# Patient Record
Sex: MALE | Race: WHITE | NOT HISPANIC OR LATINO | Employment: FULL TIME | URBAN - METROPOLITAN AREA
[De-identification: names, ages, dates, MRNs, and addresses within clinical notes are randomized per-mention and may not be internally consistent; named-entity substitution may affect disease eponyms.]

---

## 2017-01-03 ENCOUNTER — TELEPHONE (OUTPATIENT)
Dept: CARDIOLOGY | Facility: MEDICAL CENTER | Age: 67
End: 2017-01-03

## 2017-01-03 NOTE — TELEPHONE ENCOUNTER
"----- Message from Natalia Merritt L.P.N. sent at 1/3/2017  9:20 AM PST -----  Regarding: FW: Pt passed the FAA physical  Contact: [phone]  Contacted patient and will be more prepared next year if \"flash images\" are requested.    ----- Message -----     From: Antonette Rosas     Sent: 1/3/2017   9:06 AM       To: Natalia Merritt L.P.N.  Subject: Pt passed the FAA physical                       RO/natalia Hernandez calling with feedback on his FAA physical feedback.  Pt wants to let you know he passed and would like to thank you for all your help & to wish you a very Happy New Year too.  Please call  when you have a moment today.      "

## 2017-03-08 ENCOUNTER — TELEPHONE (OUTPATIENT)
Dept: CARDIOLOGY | Facility: MEDICAL CENTER | Age: 67
End: 2017-03-08

## 2017-03-08 NOTE — TELEPHONE ENCOUNTER
"----- Message from Antonette Rosas sent at 3/8/2017  9:14 AM PST -----  Regarding: stress test needed by 4/1 for FAA  Contact: [phone]  Candida Hernandez received his FAA \"pass\", but needs stress test done by 4/1 as he is being monitored closely.  Please call to discuss, [phone]  "

## 2017-03-09 NOTE — TELEPHONE ENCOUNTER
"Spoke with patient who reports he has upcoming requirements for his FAA physical which requires he get a \"stress test\" prior to April 1. 2017.  He is to call the FAA and clarify what type of stress test they are now requiring.  Once he has determined that he will call back to get it scheduled.  "

## 2017-03-10 ENCOUNTER — TELEPHONE (OUTPATIENT)
Dept: CARDIOLOGY | Facility: MEDICAL CENTER | Age: 67
End: 2017-03-10

## 2017-03-10 DIAGNOSIS — E78.5 DYSLIPIDEMIA: ICD-10-CM

## 2017-03-10 DIAGNOSIS — I10 ESSENTIAL HYPERTENSION: ICD-10-CM

## 2017-03-10 DIAGNOSIS — Z95.1 S/P CABG X 2: ICD-10-CM

## 2017-03-10 DIAGNOSIS — Z79.899 HIGH RISK MEDICATION USE: ICD-10-CM

## 2017-03-10 DIAGNOSIS — I25.10 MULTIPLE VESSEL CORONARY ARTERY DISEASE: ICD-10-CM

## 2017-03-10 NOTE — TELEPHONE ENCOUNTER
S/w pt in regards to FAA requirements. Pt states he needs an exercises stress test, a cardiology evaluation, and labs done before 4/1/17. Orders placed for labs and Stress test. Labs slips faxed to pt to [phone] per request. Message sent to scheduling pool for pt to get set up with an earlier appointment and Stress test. Pt will call back if not contacted by scheduling.

## 2017-03-13 NOTE — TELEPHONE ENCOUNTER
Called pt back to discuss future appointments. Pt assisted with getting scheduled appointments, labs slips re-faxed per pt request.

## 2017-03-14 ENCOUNTER — NON-PROVIDER VISIT (OUTPATIENT)
Dept: CARDIOLOGY | Facility: MEDICAL CENTER | Age: 67
End: 2017-03-14
Payer: MEDICARE

## 2017-03-14 VITALS
HEIGHT: 68 IN | SYSTOLIC BLOOD PRESSURE: 120 MMHG | DIASTOLIC BLOOD PRESSURE: 64 MMHG | BODY MASS INDEX: 27.58 KG/M2 | WEIGHT: 182 LBS | OXYGEN SATURATION: 95 % | HEART RATE: 81 BPM

## 2017-03-14 DIAGNOSIS — Z95.1 S/P CABG X 2: ICD-10-CM

## 2017-03-14 DIAGNOSIS — I10 ESSENTIAL HYPERTENSION: ICD-10-CM

## 2017-03-14 DIAGNOSIS — I25.10 MULTIPLE VESSEL CORONARY ARTERY DISEASE: ICD-10-CM

## 2017-03-14 LAB — TREADMILL STRESS: NORMAL

## 2017-03-14 PROCEDURE — 93015 CV STRESS TEST SUPVJ I&R: CPT | Performed by: INTERNAL MEDICINE

## 2017-03-17 ENCOUNTER — OFFICE VISIT (OUTPATIENT)
Dept: CARDIOLOGY | Facility: MEDICAL CENTER | Age: 67
End: 2017-03-17
Payer: MEDICARE

## 2017-03-17 VITALS
SYSTOLIC BLOOD PRESSURE: 124 MMHG | DIASTOLIC BLOOD PRESSURE: 78 MMHG | HEART RATE: 68 BPM | BODY MASS INDEX: 28.34 KG/M2 | OXYGEN SATURATION: 98 % | HEIGHT: 68 IN | WEIGHT: 187 LBS

## 2017-03-17 DIAGNOSIS — I10 ESSENTIAL HYPERTENSION: ICD-10-CM

## 2017-03-17 LAB
ALBUMIN SERPL-MCNC: 4.4 G/DL (ref 3.6–4.8)
ALBUMIN/GLOB SERPL: 1.6 {RATIO} (ref 1.2–2.2)
ALP SERPL-CCNC: 43 IU/L (ref 39–117)
ALT SERPL-CCNC: 39 IU/L (ref 0–44)
AST SERPL-CCNC: 29 IU/L (ref 0–40)
BILIRUB SERPL-MCNC: 0.4 MG/DL (ref 0–1.2)
BUN SERPL-MCNC: 20 MG/DL (ref 8–27)
BUN/CREAT SERPL: 16 (ref 10–22)
CALCIUM SERPL-MCNC: 10.2 MG/DL (ref 8.6–10.2)
CHLORIDE SERPL-SCNC: 107 MMOL/L (ref 96–106)
CHOLEST SERPL-MCNC: 133 MG/DL (ref 100–199)
CO2 SERPL-SCNC: 21 MMOL/L (ref 18–29)
COMMENT 011824: ABNORMAL
CREAT SERPL-MCNC: 1.29 MG/DL (ref 0.76–1.27)
GLOBULIN SER CALC-MCNC: 2.7 G/DL (ref 1.5–4.5)
GLUCOSE SERPL-MCNC: 132 MG/DL (ref 65–99)
HDLC SERPL-MCNC: 32 MG/DL
LDLC SERPL CALC-MCNC: 60 MG/DL (ref 0–99)
POTASSIUM SERPL-SCNC: 5.1 MMOL/L (ref 3.5–5.2)
PROT SERPL-MCNC: 7.1 G/DL (ref 6–8.5)
SODIUM SERPL-SCNC: 145 MMOL/L (ref 134–144)
TRIGL SERPL-MCNC: 204 MG/DL (ref 0–149)
VLDLC SERPL CALC-MCNC: 41 MG/DL (ref 5–40)

## 2017-03-17 PROCEDURE — 4040F PNEUMOC VAC/ADMIN/RCVD: CPT | Mod: 8P | Performed by: INTERNAL MEDICINE

## 2017-03-17 PROCEDURE — 3017F COLORECTAL CA SCREEN DOC REV: CPT | Mod: 8P | Performed by: INTERNAL MEDICINE

## 2017-03-17 PROCEDURE — G8598 ASA/ANTIPLAT THER USED: HCPCS | Performed by: INTERNAL MEDICINE

## 2017-03-17 PROCEDURE — 1101F PT FALLS ASSESS-DOCD LE1/YR: CPT | Mod: 8P | Performed by: INTERNAL MEDICINE

## 2017-03-17 PROCEDURE — 99214 OFFICE O/P EST MOD 30 MIN: CPT | Performed by: INTERNAL MEDICINE

## 2017-03-17 PROCEDURE — G8484 FLU IMMUNIZE NO ADMIN: HCPCS | Performed by: INTERNAL MEDICINE

## 2017-03-17 PROCEDURE — 1036F TOBACCO NON-USER: CPT | Performed by: INTERNAL MEDICINE

## 2017-03-17 PROCEDURE — G8432 DEP SCR NOT DOC, RNG: HCPCS | Performed by: INTERNAL MEDICINE

## 2017-03-17 PROCEDURE — G8420 CALC BMI NORM PARAMETERS: HCPCS | Performed by: INTERNAL MEDICINE

## 2017-03-17 ASSESSMENT — ENCOUNTER SYMPTOMS
DIZZINESS: 0
NEUROLOGICAL NEGATIVE: 1
PALPITATIONS: 0
CARDIOVASCULAR NEGATIVE: 1
ORTHOPNEA: 0
LOSS OF CONSCIOUSNESS: 0
MUSCULOSKELETAL NEGATIVE: 1
EYES NEGATIVE: 1
PND: 0
GASTROINTESTINAL NEGATIVE: 1
SHORTNESS OF BREATH: 0
CLAUDICATION: 0
PSYCHIATRIC NEGATIVE: 1

## 2017-03-17 NOTE — MR AVS SNAPSHOT
"Pradip Baez   3/17/2017 7:40 AM   Office Visit   MRN: [id number]    Department:  [facility]   Dept Phone:  [phone]    Description:  Male : 1950   Provider:  Luda Yanez M.D.           Reason for Visit     Follow-Up           Allergies as of 3/17/2017     No Known Allergies      You were diagnosed with     Essential hypertension   [[id number]]         Vital Signs     Blood Pressure Pulse Height Weight Body Mass Index Oxygen Saturation    124/78 mmHg 68 1.727 m (5' 7.99\") 84.823 kg (187 lb) 28.44 kg/m2 98%    Smoking Status                   Never Smoker            Basic Information     Date Of Birth Sex Race Ethnicity Preferred Language    1950 Male White Non- English      Problem List              ICD-10-CM Priority Class Noted - Resolved    DM (diabetes mellitus) (CMS-HCC) E11.9 High  2012 - Present    Dyslipidemia E78.5   2012 - Present    Multiple vessel coronary artery disease I25.10   2012 - Present    Hypercholesterolemia with hypertriglyceridemia E78.2   2014 - Present    HTN (hypertension) I10   10/7/2015 - Present    Normocytic anemia D64.9   10/7/2015 - Present    S/P CABG x 2 Z95.1   10/23/2015 - Present      Health Maintenance        Date Due Completion Dates    DIABETES MONOFILAMENT / LE EXAM 1949 ---    RETINAL SCREENING 1966 ---    FASTING LIPID PROFILE 1966 ---    URINE ACR / MICROALBUMIN 1966 ---    IMM DTaP/Tdap/Td Vaccine (1 - Tdap) 1967 ---    COLONOSCOPY 1998 ---    IMM ZOSTER VACCINE 2008 ---    IMM PNEUMOCOCCAL 65+ (ADULT) LOW/MEDIUM RISK SERIES (1 of 2 - PCV13) 2013 ---    A1C SCREENING 2016 10/5/2015    IMM INFLUENZA (1) 2016 ---    SERUM CREATININE 10/11/2016 10/11/2015, 10/10/2015, 10/9/2015, 10/8/2015, 10/7/2015, 10/5/2015, 2015            Current Immunizations     No immunizations on file.      Below and/or attached are the medications your provider expects you to take. Review " all of your home medications and newly ordered medications with your provider and/or pharmacist. Follow medication instructions as directed by your provider and/or pharmacist. Please keep your medication list with you and share with your provider. Update the information when medications are discontinued, doses are changed, or new medications (including over-the-counter products) are added; and carry medication information at all times in the event of emergency situations     Allergies:  No Known Allergies          Medications  Valid as of: March 17, 2017 -  8:26 AM    Generic Name Brand Name Tablet Size Instructions for use    Aspirin (Tablet Delayed Response) aspirin 81 MG Take 1 Tab by mouth every day.        Atorvastatin Calcium (Tab) LIPITOR 40 MG Take 1 Tab by mouth every evening.        Clopidogrel Bisulfate (Tab) PLAVIX 75 MG TAKE 1 TABLET BY MOUTH EVERY DAY        Cyanocobalamin   Take 1 Tab by mouth every day. 3000 IU SL        Empagliflozin (Tab) Empagliflozin 10 MG Take 1 Tab by mouth every day. NOT TAKING        Folic Acid (Tab) FOLVITE 1 MG Take 1 mg by mouth every day.        Glimepiride (Tab) AMARYL 4 MG Take 4 mg by mouth 2 times a day.        Lisinopril (Tab) PRINIVIL 5 MG Take 1 Tab by mouth every day.        MetFORMIN HCl (TABLET SR 24 HR) GLUCOPHAGE  MG Take 500 mg by mouth 2 times a day.        MetFORMIN HCl   Take 500 mg by mouth 2 Times a Day.        Metoprolol Tartrate (Tab) LOPRESSOR 25 MG Take 1 Tab by mouth 2 times a day.        Misc Natural Products (Cap) TRIPLE FLEX  Take 2 Caps by mouth every day.        SITagliptin Phosphate (Tab) JANUVIA 100 MG Take 100 mg by mouth every day.        Tamsulosin HCl (Cap) FLOMAX 0.4 MG Take 0.4 mg by mouth ONE-HALF HOUR AFTER BREAKFAST.        .                 Medicines prescribed today were sent to:     [id number] - [place] - [address]    [address] [place] [address]    Phone: [phone] Fax: [phone]    Open 24  Hours?: No      Medication refill instructions:       If your prescription bottle indicates you have medication refills left, it is not necessary to call your provider’s office. Please contact your pharmacy and they will refill your medication.    If your prescription bottle indicates you do not have any refills left, you may request refills at any time through one of the following ways: The online [employer] system (except Urgent Care), by calling your provider’s office, or by asking your pharmacy to contact your provider’s office with a refill request. Medication refills are processed only during regular business hours and may not be available until the next business day. Your provider may request additional information or to have a follow-up visit with you prior to refilling your medication.   *Please Note: Medication refills are assigned a new Rx number when refilled electronically. Your pharmacy may indicate that no refills were authorized even though a new prescription for the same medication is available at the pharmacy. Please request the medicine by name with the pharmacy before contacting your provider for a refill.        Instructions    Please make sure to take metoprolol 25mg twice daily. It is not a once daily medicine.           [employer] Access Code: Activation code not generated  Current [employer] Status: Active

## 2017-03-17 NOTE — PROGRESS NOTES
Subjective:   Pradip Baez is a 66 y.o. 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 He has been doing well since his last appointment with Dr. Escoto. Exercises on a treadmill at 3-4 mph for 45 minutes at grade 6 without any symptoms. He denies any chest pain chest pressure, shortness of breath, palpitations or dizziness. No history of syncope. No heart failure symptoms.      Past Medical History   Diagnosis Date   • Hyperlipidemia    • Hypertension    • Myocardial infarct (CMS-HCC) 1/19/2012   • Diabetes      oral meds, Dr Cornelius   • Diabetes (CMS-HCC)    • F/u of acute inferolateral myocardial infarction (CMS-HCC) 2/7/2012     Past Surgical History   Procedure Laterality Date   • Stent placement     • Other cardiac surgery  1/19/2012     stent   • Recovery  10/2/2015     Procedure: CATH LAB [facility] WITH POSSIBLE WIEDENBECK;  Surgeon: [facility];  [place] [facility] [place]  Service:    • Multiple coronary artery bypass endo vein harvest  10/6/2015     Procedure: MULTIPLE CORONARY ARTERY BYPASS ENDO VEIN HARVEST X2;  Surgeon: Malcolm Lynn M.D.;  Location: SURGERY [facility];  Service:      Family History   Problem Relation Age of Onset   • Diabetes Mother    • Cancer Mother    • Heart Disease Father    • Diabetes Father      History   Smoking status   • Never Smoker    Smokeless tobacco   • Never Used     No Known Allergies  Outpatient Encounter Prescriptions as of 3/17/2017   Medication Sig Dispense Refill   • METFORMIN HCL PO Take 500 mg by mouth 2 Times a Day.  11   • clopidogrel (PLAVIX) 75 MG Tab TAKE 1 TABLET BY MOUTH EVERY DAY 30 Tab 10   • lisinopril (PRINIVIL) 5 MG Tab Take 1 Tab by mouth every day. 90 Tab 3   • atorvastatin (LIPITOR) 40 MG Tab Take 1 Tab by mouth every evening. 90 Tab 3   • tamsulosin  "(FLOMAX) 0.4 MG capsule Take 0.4 mg by mouth ONE-HALF HOUR AFTER BREAKFAST.     • metoprolol (LOPRESSOR) 25 MG Tab Take 1 Tab by mouth 2 times a day. (Patient taking differently: Take 25 mg by mouth every day.) 60 Tab 11   • aspirin EC 81 MG EC tablet Take 1 Tab by mouth every day. 30 Tab    • glimepiride (AMARYL) 4 MG Tab Take 4 mg by mouth 2 times a day.     • metformin ER (GLUCOPHAGE XR) 500 MG TABLET SR 24 HR Take 500 mg by mouth 2 times a day.     • sitagliptin (JANUVIA) 100 MG Tab Take 100 mg by mouth every day.     • [DISCONTINUED] carvedilol (COREG) 6.25 MG Tab TAKE 1 TAB BY MOUTH 2 TIMES A DAY, WITH MEALS.  3   • Cyanocobalamin (VITAMIN B-12 PO) Take 1 Tab by mouth every day. 3000 IU SL     • Misc Natural Products (TRIPLE FLEX) Cap Take 2 Caps by mouth every day.     • Empagliflozin (JARDIANCE) 10 MG Tab Take 1 Tab by mouth every day. NOT TAKING     • folic acid (FOLVITE) 1 MG Tab Take 1 mg by mouth every day.       No facility-administered encounter medications on file as of 3/17/2017.     Review of Systems   Constitutional: Negative for malaise/fatigue.   HENT: Negative.    Eyes: Negative.    Respiratory: Negative for shortness of breath.    Cardiovascular: Negative.  Negative for chest pain, palpitations, orthopnea, claudication, leg swelling and PND.   Gastrointestinal: Negative.    Musculoskeletal: Negative.    Skin: Negative.    Neurological: Negative.  Negative for dizziness and loss of consciousness.   Endo/Heme/Allergies: Negative.    Psychiatric/Behavioral: Negative.    All other systems reviewed and are negative.       Objective:   /78 mmHg  Pulse 68  Ht 1.727 m (5' 7.99\")  Wt 84.823 kg (187 lb)  BMI 28.44 kg/m2  SpO2 98%    Physical Exam   Constitutional: He is oriented to person, place, and time. He appears well-developed and well-nourished. No distress.   HENT:   Head: Normocephalic and atraumatic.   Eyes: Conjunctivae are normal. Right eye exhibits no discharge. Left eye exhibits " no discharge. No scleral icterus.   Neck: Normal range of motion. Neck supple. No JVD present.   Cardiovascular: Normal rate, regular rhythm, S1 normal, S2 normal, normal heart sounds and normal pulses.  Exam reveals no gallop, no S3, no S4 and no friction rub.    No murmur heard.   No systolic murmur is present    No diastolic murmur is present   Pulses:       Carotid pulses are 2+ on the right side, and 2+ on the left side.       Radial pulses are 2+ on the right side, and 2+ on the left side.        Dorsalis pedis pulses are 2+ on the right side, and 2+ on the left side.        Posterior tibial pulses are 2+ on the right side, and 2+ on the left side.   Pulmonary/Chest: Effort normal and breath sounds normal. No respiratory distress. He has no wheezes. He has no rales.   Abdominal: Soft. There is no tenderness.   Musculoskeletal: He exhibits no edema.   Neurological: He is alert and oriented to person, place, and time.   Skin: Skin is warm and dry. He is not diaphoretic.   Psychiatric: He has a normal mood and affect.   Nursing note and vitals reviewed.      Exercise treadmill test performed earlier this month. The tracings and report were reviewed. Patient exercised on the Nino protocol for 9 minutes and 31 seconds achieving a peak heart rate of 156 bpm which was over 100% of MPHR. Test stopped due to fatigue, target heart rate achieved. No ST-T wave changes noted. Low risk treadmill test.    Labs reviewed and unremarkable. He has a history of hyponatremia now sodium 145.    Assessment:     1. Essential hypertension  EKG       Medical Decision Making:  Today's Assessment / Status / Plan:     67 y/o M with CAD s/p CABG x 2, doing well. No anginal symptoms. 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 Original copy of the treadmill test and copy of labs were given to the patient. Low risk treadmill test.      1. CAD s/p CABG, LIMA --> Diag, SVG--> Intramyocardial LAD  No anginal  symptoms. Continue current regimen. Patient is currently opposed to be on metoprolol 25 mg twice a day however he's only taking 25 mg once a day. I will encourage the patient to comply with taking it twice a day. Written instructions given to the patient. He is still on Plavix for unclear reasons but will defer this decision to Dr. Hernandez.    2. HTN    - BP at goal. Continue current regimen.    3. HLD    -At goal. Continue atorvastatin at current dose.    4. FAA 's License    - records provided as above. Low risk treadmill test at 10 METs. No anginal symptoms. Good functional capacity.     Return in about 1 year (around 3/17/2018).    Thank you for allowing me to participate in the care of this patient. Please do not hesitate to contact me with any questions.    Luda Yanez MD  Cardiologist  [facility]

## 2017-03-20 NOTE — TELEPHONE ENCOUNTER
Pt had an appointment with Dr. Yanez 3/17/17 for [facility] physical to review all findings on treadmill and lab.  He did go out the door with the original's of all testing.

## 2017-03-24 ENCOUNTER — TELEPHONE (OUTPATIENT)
Dept: CARDIOLOGY | Facility: MEDICAL CENTER | Age: 67
End: 2017-03-24

## 2017-03-24 NOTE — TELEPHONE ENCOUNTER
DAHLIA/Clotilde   Patient wants to discuss his last cardiac stress test. He can be reached at [phone]          Pt requesting last OV notes be sent to Dr. Crow for FAA requirements. Information faxed to #[phone]

## 2017-03-30 ENCOUNTER — TELEPHONE (OUTPATIENT)
Dept: CARDIOLOGY | Facility: MEDICAL CENTER | Age: 67
End: 2017-03-30

## 2017-03-30 NOTE — TELEPHONE ENCOUNTER
S/w pts and he is requesting both ov notes from last appt with AA and last appt with RO. Faxed these records to pts home fax: [phone] and pts office fax: [phone] as requested.

## 2017-04-17 DIAGNOSIS — E78.5 HYPERLIPIDEMIA, UNSPECIFIED HYPERLIPIDEMIA TYPE: ICD-10-CM

## 2017-04-18 RX ORDER — ATORVASTATIN CALCIUM 40 MG/1
TABLET, FILM COATED ORAL
Qty: 90 TAB | Refills: 3 | Status: SHIPPED | OUTPATIENT
Start: 2017-04-18 | End: 2018-05-16 | Stop reason: SDUPTHER

## 2017-05-03 DIAGNOSIS — I25.10 MULTIPLE VESSEL CORONARY ARTERY DISEASE: ICD-10-CM

## 2017-07-05 DIAGNOSIS — I10 ESSENTIAL HYPERTENSION: ICD-10-CM

## 2017-07-05 RX ORDER — LISINOPRIL 5 MG/1
5 TABLET ORAL DAILY
Qty: 90 TAB | Refills: 3 | Status: SHIPPED | OUTPATIENT
Start: 2017-07-05 | End: 2018-04-17 | Stop reason: SDUPTHER

## 2017-11-22 DIAGNOSIS — Z95.1 S/P CABG X 2: ICD-10-CM

## 2017-11-22 RX ORDER — CLOPIDOGREL BISULFATE 75 MG/1
75 TABLET ORAL
Qty: 30 TAB | Refills: 5 | OUTPATIENT
Start: 2017-11-22 | End: 2018-05-16 | Stop reason: SDUPTHER

## 2018-04-17 DIAGNOSIS — I10 ESSENTIAL HYPERTENSION: ICD-10-CM

## 2018-04-18 RX ORDER — LISINOPRIL 5 MG/1
5 TABLET ORAL DAILY
Qty: 90 TAB | Refills: 0 | Status: SHIPPED | OUTPATIENT
Start: 2018-04-18 | End: 2018-05-30 | Stop reason: SDUPTHER

## 2018-05-08 DIAGNOSIS — I25.10 MULTIPLE VESSEL CORONARY ARTERY DISEASE: ICD-10-CM

## 2018-05-11 DIAGNOSIS — I25.10 MULTIPLE VESSEL CORONARY ARTERY DISEASE: ICD-10-CM

## 2018-05-16 DIAGNOSIS — Z95.1 S/P CABG X 2: ICD-10-CM

## 2018-05-16 DIAGNOSIS — E78.5 HYPERLIPIDEMIA, UNSPECIFIED HYPERLIPIDEMIA TYPE: ICD-10-CM

## 2018-05-17 RX ORDER — ATORVASTATIN CALCIUM 40 MG/1
40 TABLET, FILM COATED ORAL EVERY EVENING
Qty: 30 TAB | Refills: 0 | Status: SHIPPED | OUTPATIENT
Start: 2018-05-17 | End: 2018-05-30 | Stop reason: SDUPTHER

## 2018-05-17 RX ORDER — CLOPIDOGREL BISULFATE 75 MG/1
TABLET ORAL
Qty: 30 TAB | Refills: 0 | Status: SHIPPED | OUTPATIENT
Start: 2018-05-17 | End: 2018-05-30 | Stop reason: SDUPTHER

## 2018-05-30 ENCOUNTER — OFFICE VISIT (OUTPATIENT)
Dept: CARDIOLOGY | Facility: MEDICAL CENTER | Age: 68
End: 2018-05-30
Payer: MEDICARE

## 2018-05-30 VITALS
HEIGHT: 68 IN | HEART RATE: 77 BPM | OXYGEN SATURATION: 93 % | DIASTOLIC BLOOD PRESSURE: 78 MMHG | BODY MASS INDEX: 28.34 KG/M2 | WEIGHT: 187 LBS | SYSTOLIC BLOOD PRESSURE: 126 MMHG

## 2018-05-30 DIAGNOSIS — I10 ESSENTIAL HYPERTENSION: ICD-10-CM

## 2018-05-30 DIAGNOSIS — I25.10 MULTIPLE VESSEL CORONARY ARTERY DISEASE: ICD-10-CM

## 2018-05-30 DIAGNOSIS — E78.5 HYPERLIPIDEMIA, UNSPECIFIED HYPERLIPIDEMIA TYPE: ICD-10-CM

## 2018-05-30 DIAGNOSIS — Z95.1 S/P CABG X 2: ICD-10-CM

## 2018-05-30 PROCEDURE — 99214 OFFICE O/P EST MOD 30 MIN: CPT | Performed by: NURSE PRACTITIONER

## 2018-05-30 RX ORDER — LISINOPRIL 5 MG/1
5 TABLET ORAL DAILY
Qty: 90 TAB | Refills: 3 | Status: SHIPPED | OUTPATIENT
Start: 2018-05-30 | End: 2019-06-20 | Stop reason: SDUPTHER

## 2018-05-30 RX ORDER — CLOPIDOGREL BISULFATE 75 MG/1
75 TABLET ORAL
Qty: 90 TAB | Refills: 3 | Status: SHIPPED | OUTPATIENT
Start: 2018-05-30 | End: 2019-06-03 | Stop reason: SDUPTHER

## 2018-05-30 RX ORDER — ATORVASTATIN CALCIUM 40 MG/1
40 TABLET, FILM COATED ORAL EVERY EVENING
Qty: 90 TAB | Refills: 3 | Status: SHIPPED | OUTPATIENT
Start: 2018-05-30 | End: 2019-06-03 | Stop reason: SDUPTHER

## 2018-05-30 ASSESSMENT — ENCOUNTER SYMPTOMS
COUGH: 0
PND: 0
CLAUDICATION: 0
MYALGIAS: 0
PALPITATIONS: 0
ABDOMINAL PAIN: 0
FEVER: 0
DIZZINESS: 0
SHORTNESS OF BREATH: 0
ORTHOPNEA: 0

## 2018-05-30 NOTE — LETTER
[facility]   [address]  [place] [address]  Phone: [phone]  Fax: [phone]              Pradip Baez  1950    Encounter Date: 5/30/2018    JANE Agosto          PROGRESS NOTE:  Chief Complaint   Patient presents with   • Coronary Artery Disease     fv       Subjective:   Pradip Baez is a 67 y.o. male who presents today to follow up on his CAD, HTN and HLD.    Patient of Dr. Escoto. He was last seen in clinic on 3/17/17 with Dr. Yanez.     Since his last visit, patient has been feeling well.  He denies chest pain, shortness of breath, palpitations, dizziness/lightheadedness, orthopnea, PND or Edema.     He exercises on a treadmill for 45 minutes 3 times per week and lifts weights for 1 hour 3 times a week.    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    Additonally, patient has the following medical problems:    -CAD with Stent in 2012 then CABG x 2 (LIMA to distal diagonal, RSVG to distal intramyocardial LAD) in 10/6/2015    -HTN: Takes lisinopril 5 mg daily and metoprolol 25 mg twice daily    -Hyperlipidemia: Takes atorvastatin 40 mg daily    -Diabetes: followed by Endocrinology      Past Medical History:   Diagnosis Date   • Diabetes     oral meds, Dr Cornelius   • Diabetes ([facility])    • F/u of acute inferolateral myocardial infarction (HCC) 2/7/2012   • Hyperlipidemia    • Hypertension    • Myocardial infarct (HCC) 1/19/2012     Past Surgical History:   Procedure Laterality Date   • MULTIPLE CORONARY ARTERY BYPASS ENDO VEIN HARVEST  10/6/2015    Procedure: MULTIPLE CORONARY ARTERY BYPASS ENDO VEIN HARVEST X2;  Surgeon: Malcolm Lynn M.D.;  Location: SURGERY [facility];  Service:    • RECOVERY  10/2/2015    Procedure: CATH LAB [facility] WITH POSSIBLE VERN;  Surgeon: Recovery[facility] Surgery;  Location: SURGERY PRE-POST PROC UNIT [facility];  Service:    • OTHER CARDIAC SURGERY  1/19/2012    stent   • STENT PLACEMENT          Family History   Problem Relation Age of Onset   • Diabetes Mother    • Cancer Mother    • Heart Disease Father      pacemaker   • Diabetes Father    • Heart Disease Sister      hole in heart     Social History     Social History   • Marital status:      Spouse name: N/A   • Number of children: N/A   • Years of education: N/A     Occupational History   • Not on file.     Social History Main Topics   • Smoking status: Never Smoker   • Smokeless tobacco: Never Used   • Alcohol use Yes      Comment: occ   • Drug use: No   • Sexual activity: Not on file     Other Topics Concern   • Not on file     Social History Narrative   • No narrative on file     No Known Allergies  Outpatient Encounter Prescriptions as of 5/30/2018   Medication Sig Dispense Refill   • metoprolol (LOPRESSOR) 25 MG Tab Take 1 Tab by mouth 2 times a day. 180 Tab 3   • lisinopril (PRINIVIL) 5 MG Tab Take 1 Tab by mouth every day. 90 Tab 3   • clopidogrel (PLAVIX) 75 MG Tab Take 1 Tab by mouth every day. 90 Tab 3   • atorvastatin (LIPITOR) 40 MG Tab Take 1 Tab by mouth every evening. 90 Tab 3   • tamsulosin (FLOMAX) 0.4 MG capsule Take 0.4 mg by mouth ONE-HALF HOUR AFTER BREAKFAST.     • aspirin EC 81 MG EC tablet Take 1 Tab by mouth every day. 30 Tab    • glimepiride (AMARYL) 4 MG Tab Take 4 mg by mouth 2 times a day.     • metformin ER (GLUCOPHAGE XR) 500 MG TABLET SR 24 HR Take 500 mg by mouth 2 times a day.     • sitagliptin (JANUVIA) 100 MG Tab Take 100 mg by mouth every day.     • Cyanocobalamin (VITAMIN B-12 PO) Take 1 Tab by mouth every day. 3000 IU SL     • Empagliflozin (JARDIANCE) 10 MG Tab Take 1 Tab by mouth every day. 25mg 1x daily     • [DISCONTINUED] clopidogrel (PLAVIX) 75 MG Tab TAKE 1 TABLET BY MOUTH EVERY DAY 30 Tab 0   • [DISCONTINUED] atorvastatin (LIPITOR) 40 MG Tab Take 1 Tab by mouth every evening. 30 Tab 0   • [DISCONTINUED] metoprolol (LOPRESSOR) 25 MG Tab TAKE 1 TAB BY MOUTH 2 TIMES A DAY. 60 Tab 0   •  "[DISCONTINUED] metoprolol (LOPRESSOR) 25 MG Tab Take 1 Tab by mouth 2 times a day. 60 Tab 0   • [DISCONTINUED] lisinopril (PRINIVIL) 5 MG Tab Take 1 Tab by mouth every day. Needs to be seen for further refills. Thank you 90 Tab 0   • [DISCONTINUED] METFORMIN HCL PO Take 500 mg by mouth 2 Times a Day.  11   • [DISCONTINUED] Misc Natural Products (TRIPLE FLEX) Cap Take 2 Caps by mouth every day.     • [DISCONTINUED] folic acid (FOLVITE) 1 MG Tab Take 1 mg by mouth every day.       No facility-administered encounter medications on file as of 5/30/2018.      Review of Systems   Constitutional: Negative for fever and malaise/fatigue.   Respiratory: Negative for cough and shortness of breath.    Cardiovascular: Negative for chest pain, palpitations, orthopnea, claudication, leg swelling and PND.   Gastrointestinal: Negative for abdominal pain.   Musculoskeletal: Negative for myalgias.   Neurological: Negative for dizziness.   All other systems reviewed and are negative.       Objective:   /78   Pulse 77   Ht 1.727 m (5' 8\")   Wt 84.8 kg (187 lb)   SpO2 93%   BMI 28.43 kg/m²      Physical Exam   Constitutional: He is oriented to person, place, and time. He appears well-developed and well-nourished.   HENT:   Head: Normocephalic and atraumatic.   Eyes: EOM are normal. Pupils are equal, round, and reactive to light.   Neck: Normal range of motion. Neck supple. No JVD present.   Cardiovascular: Normal rate, regular rhythm and normal heart sounds.    Pulmonary/Chest: Effort normal and breath sounds normal. No respiratory distress. He has no wheezes. He has no rales.   Musculoskeletal: He exhibits no edema.   Neurological: He is alert and oriented to person, place, and time.   Skin: Skin is warm and dry.   Psychiatric: He has a normal mood and affect. His behavior is normal.     Lab Results   Component Value Date/Time    CHOLSTRLTOT 133 03/16/2017 07:09 AM    LDL 60 03/16/2017 07:09 AM    HDL 32 (L) 03/16/2017 07:09 " AM    TRIGLYCERIDE 204 (H) 03/16/2017 07:09 AM       Lab Results   Component Value Date/Time    SODIUM 145 (H) 03/16/2017 07:09 AM    SODIUM 133 (L) 10/11/2015 01:40 AM    POTASSIUM 5.1 03/16/2017 07:09 AM    POTASSIUM 3.8 10/11/2015 01:40 AM    CHLORIDE 107 (H) 03/16/2017 07:09 AM    CHLORIDE 98 10/11/2015 01:40 AM    CO2 21 03/16/2017 07:09 AM    CO2 28 10/11/2015 01:40 AM    GLUCOSE 132 (H) 03/16/2017 07:09 AM    GLUCOSE 186 (H) 10/11/2015 01:40 AM    BUN 20 03/16/2017 07:09 AM    BUN 22 10/11/2015 01:40 AM    CREATININE 1.29 (H) 03/16/2017 07:09 AM    CREATININE 1.21 10/11/2015 01:40 AM    BUNCREATRAT 16 03/16/2017 07:09 AM     Lab Results   Component Value Date/Time    ALKPHOSPHAT 43 03/16/2017 07:09 AM    ALKPHOSPHAT 40 10/05/2015 03:01 PM    ASTSGOT 29 03/16/2017 07:09 AM    ASTSGOT 15 10/05/2015 03:01 PM    ALTSGPT 39 03/16/2017 07:09 AM    ALTSGPT 34 10/05/2015 03:01 PM    TBILIRUBIN 0.4 03/16/2017 07:09 AM    TBILIRUBIN 0.5 10/05/2015 03:01 PM      Stress echo 5/14/2012  CONCLUSIONS  Large area of inferolateral -apical severe hypokinesis at rest and post   exercise  Mid anterior wall appears hypokinetic post exercise  suggesting   Ischemia    Stress echo 2/3/2014  CONCLUSIONS  Negative stress echocardiogram for ischemia.  Normal resting left ventricular systolic function with ejection   fraction of 60-65%.  Good exercise tolerance.  No arrythmias detected.  Chest pain was not experienced during this study    Stress Echo Report 9/17/2015  Abnormal stress echocardiography indicating mid-distal anterior wall   ischemia with posterolateral scar.  Good exercise capacity    Heart Cath 10/2/15  CONCLUSIONS:  1.  High-grade distal right posterolateral and right posterior descending   artery disease.  2.  High-grade 80% proximal LAD/D1 bifurcation disease, Lopez 1, 1, 0.  3.  Mild in-stent restenosis of the left circumflex bare metal stent.  4.  FFR of the left circumflex is normal at 0.95.       Treadmill stress  test 5/3/2016  Provider conclusions and analysis: Normal ECG stress treadmill  test without evidence of ischemia.  Good functional capacity.    Transthoracic Echo Report 5/6/2016  Normal left ventricular size, wall thickness, and systolic function.  Left ventricular ejection fraction is visually estimated to be 60%.  Grade I diastolic dysfunction.  Mildly dilated right ventricle.  Mild mitral regurgitation.  Mild tricuspid regurgitation.  Right ventricular systolic pressure is estimated to be 30 mmHg.      Myocardial Perfusion Report  9/16/2016   NUCLEAR IMAGING INTERPRETATION   Inferolateral infarction with erum-infarct ischemia.   The estimated ejection fraction is 47%.   Inferolateral hypokinesis.   Exercise capacity very good at 10.1 METS.   ECG INTERPRETATION   Negative stress ECG for ischemia. Exercise capacity very good at 10.1 METS.    Treadmill stress test 3/14/2017  Provider conclusions and analysis: Baseline ECG:Normal ECG,  inferior infarct age undetermined, possible anterior-lateral  infarct age undetermined  Stress ECG: No ischemic T wave changes with peak stress  Impression: Normal stress ECG    Assessment:     1. Hyperlipidemia, unspecified hyperlipidemia type  COMP METABOLIC PANEL    LIPID PROFILE    atorvastatin (LIPITOR) 40 MG Tab   2. S/P CABG x 2  COMP METABOLIC PANEL    LIPID PROFILE    clopidogrel (PLAVIX) 75 MG Tab    atorvastatin (LIPITOR) 40 MG Tab   3. Multiple vessel coronary artery disease  COMP METABOLIC PANEL    LIPID PROFILE    metoprolol (LOPRESSOR) 25 MG Tab    atorvastatin (LIPITOR) 40 MG Tab   4. Essential hypertension  COMP METABOLIC PANEL    LIPID PROFILE    metoprolol (LOPRESSOR) 25 MG Tab    lisinopril (PRINIVIL) 5 MG Tab       Medical Decision Making:  Today's Assessment / Status / Plan:   1. CAD, hx CABG x 2/CAD: Last LDL 60 on 3/16/2017, no symptoms  -Continue aspirin 81 mg daily  -Continue atorvastatin 40 mg nightly  -Continue clopidogrel 75 mg daily   -We will obtain  lipid panel within the month    2.  Hypertension: Stable  -Continue lisinopril 5 mg daily  -Continue metoprolol 25 mg twice a day  -We will obtain CMP within the month    FU in clinic in 1 year with Dr. Escoto. 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    Patient verbalizes understanding and agrees with the plan of care.     Collaborating MD: MD Gildardo Figueroa M.D.  [address]  [place] [address]  VIA Facsimile: [phone]

## 2018-05-30 NOTE — PROGRESS NOTES
Chief Complaint   Patient presents with   • Coronary Artery Disease     fv       Subjective:   Pradip Baez is a 67 y.o. male who presents today to follow up on his CAD, HTN and HLD.    Patient of Dr. Escoto. He was last seen in clinic on 3/17/17 with Dr. Yanez.     Since his last visit, patient has been feeling well.  He denies chest pain, shortness of breath, palpitations, dizziness/lightheadedness, orthopnea, PND or Edema.     He exercises on a treadmill for 45 minutes 3 times per week and lifts weights for 1 hour 3 times a week.    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    Additonally, patient has the following medical problems:    -CAD with Stent in 2012 then CABG x 2 (LIMA to distal diagonal, RSVG to distal intramyocardial LAD) in 10/6/2015    -HTN: Takes lisinopril 5 mg daily and metoprolol 25 mg twice daily    -Hyperlipidemia: Takes atorvastatin 40 mg daily    -Diabetes: followed by Endocrinology      Past Medical History:   Diagnosis Date   • Diabetes     oral meds, Dr Cornelius   • Diabetes (HCC)    • F/u of acute inferolateral myocardial infarction (HCC) 2/7/2012   • Hyperlipidemia    • Hypertension    • Myocardial infarct (HCC) 1/19/2012     Past Surgical History:   Procedure Laterality Date   • MULTIPLE CORONARY ARTERY BYPASS ENDO VEIN HARVEST  10/6/2015    Procedure: MULTIPLE CORONARY ARTERY BYPASS ENDO VEIN HARVEST X2;  Surgeon: Malcolm Lynn M.D.;  Location: SURGERY [facility];  Service:    • RECOVERY  10/2/2015    Procedure: CATH LAB [facility] WITH POSSIBLE WIEDENBECK;  Surgeon: [facility];  Location: SURGERY PRE-POST PROC UNIT [facility];  Service:    • OTHER CARDIAC SURGERY  1/19/2012    stent   • STENT PLACEMENT       Family History   Problem Relation Age of Onset   • Diabetes Mother    • Cancer Mother    • Heart Disease Father      pacemaker   • Diabetes Father    • Heart Disease Sister      hole in heart     Social History     Social History   • Marital status:       Spouse name: N/A   • Number of children: N/A   • Years of education: N/A     Occupational History   • Not on file.     Social History Main Topics   • Smoking status: Never Smoker   • Smokeless tobacco: Never Used   • Alcohol use Yes      Comment: occ   • Drug use: No   • Sexual activity: Not on file     Other Topics Concern   • Not on file     Social History Narrative   • No narrative on file     No Known Allergies  Outpatient Encounter Prescriptions as of 5/30/2018   Medication Sig Dispense Refill   • metoprolol (LOPRESSOR) 25 MG Tab Take 1 Tab by mouth 2 times a day. 180 Tab 3   • lisinopril (PRINIVIL) 5 MG Tab Take 1 Tab by mouth every day. 90 Tab 3   • clopidogrel (PLAVIX) 75 MG Tab Take 1 Tab by mouth every day. 90 Tab 3   • atorvastatin (LIPITOR) 40 MG Tab Take 1 Tab by mouth every evening. 90 Tab 3   • tamsulosin (FLOMAX) 0.4 MG capsule Take 0.4 mg by mouth ONE-HALF HOUR AFTER BREAKFAST.     • aspirin EC 81 MG EC tablet Take 1 Tab by mouth every day. 30 Tab    • glimepiride (AMARYL) 4 MG Tab Take 4 mg by mouth 2 times a day.     • metformin ER (GLUCOPHAGE XR) 500 MG TABLET SR 24 HR Take 500 mg by mouth 2 times a day.     • sitagliptin (JANUVIA) 100 MG Tab Take 100 mg by mouth every day.     • Cyanocobalamin (VITAMIN B-12 PO) Take 1 Tab by mouth every day. 3000 IU SL     • Empagliflozin (JARDIANCE) 10 MG Tab Take 1 Tab by mouth every day. 25mg 1x daily     • [DISCONTINUED] clopidogrel (PLAVIX) 75 MG Tab TAKE 1 TABLET BY MOUTH EVERY DAY 30 Tab 0   • [DISCONTINUED] atorvastatin (LIPITOR) 40 MG Tab Take 1 Tab by mouth every evening. 30 Tab 0   • [DISCONTINUED] metoprolol (LOPRESSOR) 25 MG Tab TAKE 1 TAB BY MOUTH 2 TIMES A DAY. 60 Tab 0   • [DISCONTINUED] metoprolol (LOPRESSOR) 25 MG Tab Take 1 Tab by mouth 2 times a day. 60 Tab 0   • [DISCONTINUED] lisinopril (PRINIVIL) 5 MG Tab Take 1 Tab by mouth every day. Needs to be seen for further refills. Thank you 90 Tab 0   • [DISCONTINUED] METFORMIN HCL PO Take 500 mg  "by mouth 2 Times a Day.  11   • [DISCONTINUED] Misc Natural Products (TRIPLE FLEX) Cap Take 2 Caps by mouth every day.     • [DISCONTINUED] folic acid (FOLVITE) 1 MG Tab Take 1 mg by mouth every day.       No facility-administered encounter medications on file as of 5/30/2018.      Review of Systems   Constitutional: Negative for fever and malaise/fatigue.   Respiratory: Negative for cough and shortness of breath.    Cardiovascular: Negative for chest pain, palpitations, orthopnea, claudication, leg swelling and PND.   Gastrointestinal: Negative for abdominal pain.   Musculoskeletal: Negative for myalgias.   Neurological: Negative for dizziness.   All other systems reviewed and are negative.       Objective:   /78   Pulse 77   Ht 1.727 m (5' 8\")   Wt 84.8 kg (187 lb)   SpO2 93%   BMI 28.43 kg/m²     Physical Exam   Constitutional: He is oriented to person, place, and time. He appears well-developed and well-nourished.   HENT:   Head: Normocephalic and atraumatic.   Eyes: EOM are normal. Pupils are equal, round, and reactive to light.   Neck: Normal range of motion. Neck supple. No JVD present.   Cardiovascular: Normal rate, regular rhythm and normal heart sounds.    Pulmonary/Chest: Effort normal and breath sounds normal. No respiratory distress. He has no wheezes. He has no rales.   Musculoskeletal: He exhibits no edema.   Neurological: He is alert and oriented to person, place, and time.   Skin: Skin is warm and dry.   Psychiatric: He has a normal mood and affect. His behavior is normal.     Lab Results   Component Value Date/Time    CHOLSTRLTOT 133 03/16/2017 07:09 AM    LDL 60 03/16/2017 07:09 AM    HDL 32 (L) 03/16/2017 07:09 AM    TRIGLYCERIDE 204 (H) 03/16/2017 07:09 AM       Lab Results   Component Value Date/Time    SODIUM 145 (H) 03/16/2017 07:09 AM    SODIUM 133 (L) 10/11/2015 01:40 AM    POTASSIUM 5.1 03/16/2017 07:09 AM    POTASSIUM 3.8 10/11/2015 01:40 AM    CHLORIDE 107 (H) 03/16/2017 07:09 " AM    CHLORIDE 98 10/11/2015 01:40 AM    CO2 21 03/16/2017 07:09 AM    CO2 28 10/11/2015 01:40 AM    GLUCOSE 132 (H) 03/16/2017 07:09 AM    GLUCOSE 186 (H) 10/11/2015 01:40 AM    BUN 20 03/16/2017 07:09 AM    BUN 22 10/11/2015 01:40 AM    CREATININE 1.29 (H) 03/16/2017 07:09 AM    CREATININE 1.21 10/11/2015 01:40 AM    BUNCREATRAT 16 03/16/2017 07:09 AM     Lab Results   Component Value Date/Time    ALKPHOSPHAT 43 03/16/2017 07:09 AM    ALKPHOSPHAT 40 10/05/2015 03:01 PM    ASTSGOT 29 03/16/2017 07:09 AM    ASTSGOT 15 10/05/2015 03:01 PM    ALTSGPT 39 03/16/2017 07:09 AM    ALTSGPT 34 10/05/2015 03:01 PM    TBILIRUBIN 0.4 03/16/2017 07:09 AM    TBILIRUBIN 0.5 10/05/2015 03:01 PM      Stress echo 5/14/2012  CONCLUSIONS  Large area of inferolateral -apical severe hypokinesis at rest and post   exercise  Mid anterior wall appears hypokinetic post exercise  suggesting   Ischemia    Stress echo 2/3/2014  CONCLUSIONS  Negative stress echocardiogram for ischemia.  Normal resting left ventricular systolic function with ejection   fraction of 60-65%.  Good exercise tolerance.  No arrythmias detected.  Chest pain was not experienced during this study    Stress Echo Report 9/17/2015  Abnormal stress echocardiography indicating mid-distal anterior wall   ischemia with posterolateral scar.  Good exercise capacity    Heart Cath 10/2/15  CONCLUSIONS:  1.  High-grade distal right posterolateral and right posterior descending   artery disease.  2.  High-grade 80% proximal LAD/D1 bifurcation disease, Lopez 1, 1, 0.  3.  Mild in-stent restenosis of the left circumflex bare metal stent.  4.  FFR of the left circumflex is normal at 0.95.       Treadmill stress test 5/3/2016  Provider conclusions and analysis: Normal ECG stress treadmill  test without evidence of ischemia.  Good functional capacity.    Transthoracic Echo Report 5/6/2016  Normal left ventricular size, wall thickness, and systolic function.  Left ventricular ejection  fraction is visually estimated to be 60%.  Grade I diastolic dysfunction.  Mildly dilated right ventricle.  Mild mitral regurgitation.  Mild tricuspid regurgitation.  Right ventricular systolic pressure is estimated to be 30 mmHg.      Myocardial Perfusion Report 9/16/2016   NUCLEAR IMAGING INTERPRETATION   Inferolateral infarction with erum-infarct ischemia.   The estimated ejection fraction is 47%.   Inferolateral hypokinesis.   Exercise capacity very good at 10.1 METS.   ECG INTERPRETATION   Negative stress ECG for ischemia. Exercise capacity very good at 10.1 METS.    Treadmill stress test 3/14/2017  Provider conclusions and analysis: Baseline ECG:Normal ECG,  inferior infarct age undetermined, possible anterior-lateral  infarct age undetermined  Stress ECG: No ischemic T wave changes with peak stress  Impression: Normal stress ECG    Assessment:     1. Hyperlipidemia, unspecified hyperlipidemia type  COMP METABOLIC PANEL    LIPID PROFILE    atorvastatin (LIPITOR) 40 MG Tab   2. S/P CABG x 2  COMP METABOLIC PANEL    LIPID PROFILE    clopidogrel (PLAVIX) 75 MG Tab    atorvastatin (LIPITOR) 40 MG Tab   3. Multiple vessel coronary artery disease  COMP METABOLIC PANEL    LIPID PROFILE    metoprolol (LOPRESSOR) 25 MG Tab    atorvastatin (LIPITOR) 40 MG Tab   4. Essential hypertension  COMP METABOLIC PANEL    LIPID PROFILE    metoprolol (LOPRESSOR) 25 MG Tab    lisinopril (PRINIVIL) 5 MG Tab       Medical Decision Making:  Today's Assessment / Status / Plan:   1. CAD, hx CABG x 2/CAD: Last LDL 60 on 3/16/2017, no symptoms  -Continue aspirin 81 mg daily  -Continue atorvastatin 40 mg nightly  -Continue clopidogrel 75 mg daily   -We will obtain lipid panel within the month    2.  Hypertension: Stable  -Continue lisinopril 5 mg daily  -Continue metoprolol 25 mg twice a day  -We will obtain CMP within the month    FU in clinic in 1 year with Dr. Escoto. 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    Patient verbalizes  understanding and agrees with the plan of care.     Collaborating MD: Abhilash Nunez MD

## 2018-07-18 ENCOUNTER — TELEPHONE (OUTPATIENT)
Dept: CARDIOLOGY | Facility: MEDICAL CENTER | Age: 68
End: 2018-07-18

## 2018-07-18 LAB
ALBUMIN SERPL-MCNC: 4.4 G/DL (ref 3.6–4.8)
ALBUMIN/GLOB SERPL: 1.6 {RATIO} (ref 1.2–2.2)
ALP SERPL-CCNC: 48 IU/L (ref 39–117)
ALT SERPL-CCNC: 32 IU/L (ref 0–44)
AST SERPL-CCNC: 21 IU/L (ref 0–40)
BILIRUB SERPL-MCNC: 0.3 MG/DL (ref 0–1.2)
BUN SERPL-MCNC: 24 MG/DL (ref 8–27)
BUN/CREAT SERPL: 16 (ref 10–24)
CALCIUM SERPL-MCNC: 9.6 MG/DL (ref 8.6–10.2)
CHLORIDE SERPL-SCNC: 105 MMOL/L (ref 96–106)
CHOLEST SERPL-MCNC: 156 MG/DL (ref 100–199)
CO2 SERPL-SCNC: 17 MMOL/L (ref 20–29)
CREAT SERPL-MCNC: 1.48 MG/DL (ref 0.76–1.27)
GLOBULIN SER CALC-MCNC: 2.7 G/DL (ref 1.5–4.5)
GLUCOSE SERPL-MCNC: 223 MG/DL (ref 65–99)
HDLC SERPL-MCNC: 30 MG/DL
IF AFRICAN AMERICAN  100797: 55 ML/MIN/1.73
IF NON AFRICAN AMER 100791: 48 ML/MIN/1.73
LABORATORY COMMENT REPORT: ABNORMAL
LDLC SERPL CALC-MCNC: ABNORMAL MG/DL (ref 0–99)
POTASSIUM SERPL-SCNC: 5.6 MMOL/L (ref 3.5–5.2)
PROT SERPL-MCNC: 7.1 G/DL (ref 6–8.5)
SODIUM SERPL-SCNC: 140 MMOL/L (ref 134–144)
TRIGL SERPL-MCNC: 426 MG/DL (ref 0–149)
VLDLC SERPL CALC-MCNC: ABNORMAL MG/DL (ref 5–40)

## 2018-07-20 ENCOUNTER — TELEPHONE (OUTPATIENT)
Dept: CARDIOLOGY | Facility: MEDICAL CENTER | Age: 68
End: 2018-07-20

## 2018-07-20 NOTE — TELEPHONE ENCOUNTER
patient calling back with update   Received: Today   Message Contents   LAURYN Beltran/Olga       Pt called and said he saw Dr. Pradhan yesterday and he told him there was no need for him to see his cardiologist. The situation has been taken care of. He said no need to call him back, he just wanted to pass the message on to you.      APRN notified.

## 2019-06-03 DIAGNOSIS — Z95.1 S/P CABG X 2: ICD-10-CM

## 2019-06-03 DIAGNOSIS — E78.5 HYPERLIPIDEMIA, UNSPECIFIED HYPERLIPIDEMIA TYPE: ICD-10-CM

## 2019-06-03 DIAGNOSIS — I25.10 MULTIPLE VESSEL CORONARY ARTERY DISEASE: ICD-10-CM

## 2019-06-04 RX ORDER — CLOPIDOGREL BISULFATE 75 MG/1
75 TABLET ORAL
Qty: 90 TAB | Refills: 0 | Status: SHIPPED | OUTPATIENT
Start: 2019-06-04 | End: 2019-09-01 | Stop reason: SDUPTHER

## 2019-06-04 RX ORDER — ATORVASTATIN CALCIUM 40 MG/1
40 TABLET, FILM COATED ORAL EVERY EVENING
Qty: 90 TAB | Refills: 0 | Status: SHIPPED | OUTPATIENT
Start: 2019-06-04 | End: 2019-09-01 | Stop reason: SDUPTHER

## 2019-06-20 DIAGNOSIS — I10 ESSENTIAL HYPERTENSION: ICD-10-CM

## 2019-06-21 RX ORDER — LISINOPRIL 5 MG/1
TABLET ORAL
Qty: 90 TAB | Refills: 1 | Status: SHIPPED | OUTPATIENT
Start: 2019-06-21 | End: 2019-10-23

## 2019-07-02 DIAGNOSIS — I25.10 MULTIPLE VESSEL CORONARY ARTERY DISEASE: ICD-10-CM

## 2019-07-02 DIAGNOSIS — I10 ESSENTIAL HYPERTENSION: ICD-10-CM

## 2019-09-01 DIAGNOSIS — I25.10 MULTIPLE VESSEL CORONARY ARTERY DISEASE: ICD-10-CM

## 2019-09-01 DIAGNOSIS — Z95.1 S/P CABG X 2: ICD-10-CM

## 2019-09-01 DIAGNOSIS — E78.5 HYPERLIPIDEMIA, UNSPECIFIED HYPERLIPIDEMIA TYPE: ICD-10-CM

## 2019-09-03 RX ORDER — CLOPIDOGREL BISULFATE 75 MG/1
75 TABLET ORAL
Qty: 14 TAB | Refills: 0 | Status: SHIPPED | OUTPATIENT
Start: 2019-09-03 | End: 2019-10-09 | Stop reason: SDUPTHER

## 2019-09-03 RX ORDER — ATORVASTATIN CALCIUM 40 MG/1
40 TABLET, FILM COATED ORAL EVERY EVENING
Qty: 14 TAB | Refills: 0 | Status: SHIPPED | OUTPATIENT
Start: 2019-09-03 | End: 2019-10-09 | Stop reason: SDUPTHER

## 2019-09-13 ENCOUNTER — OFFICE VISIT (OUTPATIENT)
Dept: CARDIOLOGY | Facility: MEDICAL CENTER | Age: 69
End: 2019-09-13
Payer: MEDICARE

## 2019-09-13 VITALS
DIASTOLIC BLOOD PRESSURE: 80 MMHG | HEIGHT: 68 IN | SYSTOLIC BLOOD PRESSURE: 130 MMHG | HEART RATE: 87 BPM | WEIGHT: 180.2 LBS | OXYGEN SATURATION: 93 % | BODY MASS INDEX: 27.31 KG/M2

## 2019-09-13 DIAGNOSIS — E78.5 DYSLIPIDEMIA: ICD-10-CM

## 2019-09-13 DIAGNOSIS — Z79.899 HIGH RISK MEDICATION USE: ICD-10-CM

## 2019-09-13 DIAGNOSIS — I10 ESSENTIAL HYPERTENSION: ICD-10-CM

## 2019-09-13 DIAGNOSIS — Z91.89 OTHER SPECIFIED PERSONAL RISK FACTORS, NOT ELSEWHERE CLASSIFIED: ICD-10-CM

## 2019-09-13 DIAGNOSIS — Z95.1 S/P CABG X 2: ICD-10-CM

## 2019-09-13 DIAGNOSIS — E78.2 HYPERCHOLESTEROLEMIA WITH HYPERTRIGLYCERIDEMIA: ICD-10-CM

## 2019-09-13 DIAGNOSIS — D64.9 NORMOCYTIC ANEMIA: ICD-10-CM

## 2019-09-13 DIAGNOSIS — Z02.89 ENCOUNTER FOR FEDERAL AVIATION ADMINISTRATION (FAA) EXAMINATION: ICD-10-CM

## 2019-09-13 DIAGNOSIS — I25.10 MULTIPLE VESSEL CORONARY ARTERY DISEASE: ICD-10-CM

## 2019-09-13 PROCEDURE — 99214 OFFICE O/P EST MOD 30 MIN: CPT | Performed by: INTERNAL MEDICINE

## 2019-09-13 ASSESSMENT — ENCOUNTER SYMPTOMS
LOSS OF CONSCIOUSNESS: 0
SHORTNESS OF BREATH: 0
CONSTITUTIONAL NEGATIVE: 1
WEAKNESS: 0
NEUROLOGICAL NEGATIVE: 1
RESPIRATORY NEGATIVE: 1
GASTROINTESTINAL NEGATIVE: 1
SORE THROAT: 0
PALPITATIONS: 0
FEVER: 0
DIZZINESS: 0
COUGH: 0
HEMOPTYSIS: 0
CLAUDICATION: 0
SPUTUM PRODUCTION: 0
WHEEZING: 0
CARDIOVASCULAR NEGATIVE: 1
EYES NEGATIVE: 1
BRUISES/BLEEDS EASILY: 0
STRIDOR: 0
CHILLS: 0
PND: 0
MUSCULOSKELETAL NEGATIVE: 1
ORTHOPNEA: 0

## 2019-09-13 NOTE — PROGRESS NOTES
Chief Complaint   Patient presents with   • Hypertension       Subjective:   Pradip Baez is a 69 y.o. male who presents today as follow-up for his CAD status post CABG hypertension hyperlipidemia.  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  He has not had a stress test in some time.  He has no chest pain palpitations or PND.  He was on Jardiance but that was stopped because is not a list of approved medications by the FAA.    Past Medical History:   Diagnosis Date   • Diabetes     oral meds, Dr Cornelius   • Diabetes ([facility])    • F/u of acute inferolateral myocardial infarction ([facility]) 2/7/2012   • Hyperlipidemia    • Hypertension    • Myocardial infarct ([facility]) 1/19/2012     Past Surgical History:   Procedure Laterality Date   • MULTIPLE CORONARY ARTERY BYPASS ENDO VEIN HARVEST  10/6/2015    Procedure: MULTIPLE CORONARY ARTERY BYPASS ENDO VEIN HARVEST X2;  Surgeon: Malcolm Lynn M.D.;  Location: SURGERY [facility];  Service:    • RECOVERY  10/2/2015    Procedure: CATH LAB [facility] WITH POSSIBLE WIEDENBECK;  Surgeon: Jesus Surgery;  [place] [facility]  Service:    • OTHER CARDIAC SURGERY  1/19/2012    stent   • STENT PLACEMENT       Family History   Problem Relation Age of Onset   • Diabetes Mother    • Cancer Mother    • Heart Disease Father         pacemaker   • Diabetes Father    • Heart Disease Sister         hole in heart     Social History     Socioeconomic History   • Marital status:      Spouse name: Not on file   • Number of children: Not on file   • Years of education: Not on file   • Highest education level: Not on file   Occupational History   • Not on file   Social Needs   • Financial resource strain: Not on file   • Food insecurity:     Worry: Not on file     Inability: Not on file   • Transportation needs:     Medical: Not on file     Non-medical: Not on file   Tobacco Use   • Smoking status: Never Smoker   • Smokeless tobacco: Never Used   Substance  and Sexual Activity   • Alcohol use: Yes     Comment: occ   • Drug use: No   • Sexual activity: Not on file   Lifestyle   • Physical activity:     Days per week: Not on file     Minutes per session: Not on file   • Stress: Not on file   Relationships   • Social connections:     Talks on phone: Not on file     Gets together: Not on file     Attends Faith service: Not on file     Active member of club or organization: Not on file     Attends meetings of clubs or organizations: Not on file     Relationship status: Not on file   • Intimate partner violence:     Fear of current or ex partner: Not on file     Emotionally abused: Not on file     Physically abused: Not on file     Forced sexual activity: Not on file   Other Topics Concern   • Not on file   Social History Narrative   • Not on file     No Known Allergies  Outpatient Encounter Medications as of 9/13/2019   Medication Sig Dispense Refill   • Exenatide ER (BYDUREON) 2 MG Pen-injector Inject  as instructed.     • clopidogrel (PLAVIX) 75 MG Tab TAKE 1 TAB BY MOUTH EVERY DAY. NEEDS TO BE SEEN FOR FURTHER REFILLS. THANK YOU 14 Tab 0   • atorvastatin (LIPITOR) 40 MG Tab TAKE 1 TAB BY MOUTH EVERY EVENING. NEEDS TO BE SEEN FOR FURTHER REFILLS. THANK YOU 14 Tab 0   • metoprolol (LOPRESSOR) 25 MG Tab TAKE 1 TABLET BY MOUTH TWICE A  Tab 1   • lisinopril (PRINIVIL) 5 MG Tab TAKE 1 TABLET BY MOUTH EVERY DAY 90 Tab 1   • tamsulosin (FLOMAX) 0.4 MG capsule Take 0.4 mg by mouth ONE-HALF HOUR AFTER BREAKFAST.     • aspirin EC 81 MG EC tablet Take 1 Tab by mouth every day. 30 Tab    • glimepiride (AMARYL) 4 MG Tab Take 4 mg by mouth 2 times a day.     • Cyanocobalamin (VITAMIN B-12 PO) Take 1 Tab by mouth every day. 3000 IU SL     • metformin ER (GLUCOPHAGE XR) 500 MG TABLET SR 24 HR Take 500 mg by mouth 2 times a day.     • sitagliptin (JANUVIA) 100 MG Tab Take 100 mg by mouth every day.     • [DISCONTINUED] Empagliflozin (JARDIANCE) 10 MG Tab Take 1 Tab by mouth  "every day. 25mg 1x daily       No facility-administered encounter medications on file as of 9/13/2019.      Review of Systems   Constitutional: Negative.  Negative for chills, fever and malaise/fatigue.   HENT: Negative.  Negative for sore throat.    Eyes: Negative.    Respiratory: Negative.  Negative for cough, hemoptysis, sputum production, shortness of breath, wheezing and stridor.    Cardiovascular: Negative.  Negative for chest pain, palpitations, orthopnea, claudication, leg swelling and PND.   Gastrointestinal: Negative.    Genitourinary: Negative.    Musculoskeletal: Negative.    Skin: Negative.    Neurological: Negative.  Negative for dizziness, loss of consciousness and weakness.   Endo/Heme/Allergies: Negative.  Does not bruise/bleed easily.   All other systems reviewed and are negative.       Objective:   /80 (BP Location: Left arm, Patient Position: Sitting, BP Cuff Size: Adult)   Pulse 87   Ht 1.727 m (5' 8\")   Wt 81.7 kg (180 lb 3.2 oz)   SpO2 93%   BMI 27.40 kg/m²     Physical Exam   Constitutional: He appears well-developed and well-nourished. No distress.   HENT:   Head: Normocephalic and atraumatic.   Right Ear: External ear normal.   Left Ear: External ear normal.   Nose: Nose normal.   Mouth/Throat: No oropharyngeal exudate.   Eyes: Pupils are equal, round, and reactive to light. Conjunctivae and EOM are normal. Right eye exhibits no discharge. Left eye exhibits no discharge. No scleral icterus.   Neck: Neck supple. No JVD present.   Cardiovascular: Normal rate, regular rhythm and intact distal pulses. Exam reveals no gallop and no friction rub.   No murmur heard.  Pulmonary/Chest: Effort normal. No stridor. No respiratory distress. He has no wheezes. He has no rales. He exhibits no tenderness.   Abdominal: Soft. He exhibits no distension. There is no guarding.   Musculoskeletal: Normal range of motion. He exhibits no edema, tenderness or deformity.   Neurological: He is alert. He has " normal reflexes. He displays normal reflexes. No cranial nerve deficit. He exhibits normal muscle tone. Coordination normal.   Skin: Skin is warm and dry. No rash noted. He is not diaphoretic. No erythema. No pallor.   Psychiatric: He has a normal mood and affect. His behavior is normal. Judgment and thought content normal.   Nursing note and vitals reviewed.      Assessment:     1. S/P CABG x 2     2. Normocytic anemia     3. Multiple vessel coronary artery disease     4. Hypercholesterolemia with hypertriglyceridemia     5. Essential hypertension     6. Dyslipidemia     7. High risk medication use     8. Other specified personal risk factors, not elsewhere classified  NM-CARDIAC STRESS TEST   9. Encounter for [employer] (FAA) examination         Medical Decision Making:  Today's Assessment / Status / Plan:     69-year-old male with hypertension hyperlipidemia and CAD status post CABG with high risk medication usage and here for a FAA pre-medical exam physical.  We will get a nuclear stress test with a treadmill.  He needs to complete greater than 9 METS to be qualified for his medical.  I also asked him to look into basic med.  We will see him otherwise back in 1 year.

## 2019-10-04 ENCOUNTER — HOSPITAL ENCOUNTER (OUTPATIENT)
Dept: RADIOLOGY | Facility: MEDICAL CENTER | Age: 69
End: 2019-10-04
Attending: INTERNAL MEDICINE
Payer: MEDICARE

## 2019-10-04 DIAGNOSIS — Z91.89 OTHER SPECIFIED PERSONAL RISK FACTORS, NOT ELSEWHERE CLASSIFIED: ICD-10-CM

## 2019-10-04 PROCEDURE — A9502 TC99M TETROFOSMIN: HCPCS

## 2019-10-04 PROCEDURE — 78452 HT MUSCLE IMAGE SPECT MULT: CPT | Mod: 26 | Performed by: INTERNAL MEDICINE

## 2019-10-04 PROCEDURE — 93018 CV STRESS TEST I&R ONLY: CPT | Performed by: INTERNAL MEDICINE

## 2019-10-09 DIAGNOSIS — I25.10 MULTIPLE VESSEL CORONARY ARTERY DISEASE: ICD-10-CM

## 2019-10-09 DIAGNOSIS — Z95.1 S/P CABG X 2: ICD-10-CM

## 2019-10-09 DIAGNOSIS — E78.5 HYPERLIPIDEMIA, UNSPECIFIED HYPERLIPIDEMIA TYPE: ICD-10-CM

## 2019-10-09 DIAGNOSIS — D64.9 NORMOCYTIC ANEMIA: ICD-10-CM

## 2019-10-09 DIAGNOSIS — Z79.899 HIGH RISK MEDICATION USE: ICD-10-CM

## 2019-10-09 DIAGNOSIS — E11.69 TYPE 2 DIABETES MELLITUS WITH OTHER SPECIFIED COMPLICATION, UNSPECIFIED WHETHER LONG TERM INSULIN USE (HCC): ICD-10-CM

## 2019-10-09 RX ORDER — CLOPIDOGREL BISULFATE 75 MG/1
75 TABLET ORAL
Qty: 90 TAB | Refills: 3 | Status: CANCELLED | OUTPATIENT
Start: 2019-10-09

## 2019-10-09 RX ORDER — CLOPIDOGREL BISULFATE 75 MG/1
75 TABLET ORAL
Qty: 90 TAB | Refills: 3 | Status: SHIPPED | OUTPATIENT
Start: 2019-10-09 | End: 2019-10-23

## 2019-10-09 RX ORDER — ATORVASTATIN CALCIUM 40 MG/1
40 TABLET, FILM COATED ORAL EVERY EVENING
Qty: 90 TAB | Refills: 3 | Status: CANCELLED | OUTPATIENT
Start: 2019-10-09

## 2019-10-09 RX ORDER — ATORVASTATIN CALCIUM 40 MG/1
40 TABLET, FILM COATED ORAL EVERY EVENING
Qty: 90 TAB | Refills: 3 | Status: SHIPPED | OUTPATIENT
Start: 2019-10-09 | End: 2020-10-28 | Stop reason: SDUPTHER

## 2019-10-14 ENCOUNTER — TELEPHONE (OUTPATIENT)
Dept: CARDIOLOGY | Facility: MEDICAL CENTER | Age: 69
End: 2019-10-14

## 2019-10-14 NOTE — TELEPHONE ENCOUNTER
Get Herron M.D.  You 3 hours ago (11:11 AM)      Called patient back and got voicemail. Left a message to call us back if he wants to speak to me for update. No urgent findings on stress. Thank you.      CRISTY at [phone] notifying pt that no urgent findings were found on stress test.

## 2019-10-14 NOTE — TELEPHONE ENCOUNTER
Called [phone] and left detailed message at personal VM notifying pt that Dr. Escoto is out of the office all week and that we would call pt when RO has reviewed it and provided his recommendations based on the results.    To SIMONE

## 2019-10-21 NOTE — TELEPHONE ENCOUNTER
Returned pt's call. He needs the dictated results for stress test faxed to 2 different physicians, and copy mailed to himself as well. Faxed results to Dr. Jose Denis at [phone] and Dr. Mayur Dorsey at [phone]. Receipt confirmed for both. Copy mailed to pt.

## 2019-10-21 NOTE — TELEPHONE ENCOUNTER
RO    Patient is calling back about stress test results. 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 He can be reached at [phone].

## 2019-10-23 ENCOUNTER — OFFICE VISIT (OUTPATIENT)
Dept: CARDIOLOGY | Facility: MEDICAL CENTER | Age: 69
End: 2019-10-23
Payer: MEDICARE

## 2019-10-23 ENCOUNTER — TELEPHONE (OUTPATIENT)
Dept: CARDIOLOGY | Facility: MEDICAL CENTER | Age: 69
End: 2019-10-23

## 2019-10-23 VITALS
BODY MASS INDEX: 27.74 KG/M2 | HEART RATE: 80 BPM | OXYGEN SATURATION: 94 % | HEIGHT: 68 IN | DIASTOLIC BLOOD PRESSURE: 80 MMHG | WEIGHT: 183 LBS | SYSTOLIC BLOOD PRESSURE: 128 MMHG

## 2019-10-23 DIAGNOSIS — E11.9 TYPE 2 DIABETES MELLITUS WITHOUT COMPLICATION, WITHOUT LONG-TERM CURRENT USE OF INSULIN (HCC): ICD-10-CM

## 2019-10-23 DIAGNOSIS — I10 ESSENTIAL HYPERTENSION: ICD-10-CM

## 2019-10-23 DIAGNOSIS — I25.10 MULTIPLE VESSEL CORONARY ARTERY DISEASE: ICD-10-CM

## 2019-10-23 DIAGNOSIS — E78.5 DYSLIPIDEMIA: ICD-10-CM

## 2019-10-23 DIAGNOSIS — Z95.1 S/P CABG X 2: ICD-10-CM

## 2019-10-23 PROCEDURE — 99214 OFFICE O/P EST MOD 30 MIN: CPT | Performed by: NURSE PRACTITIONER

## 2019-10-23 RX ORDER — INSULIN GLARGINE 300 U/ML
INJECTION, SOLUTION SUBCUTANEOUS
COMMUNITY
Start: 2019-08-26 | End: 2019-10-23

## 2019-10-23 RX ORDER — LISINOPRIL 5 MG/1
5 TABLET ORAL EVERY EVENING
Qty: 90 TAB | Refills: 3 | Status: SHIPPED | OUTPATIENT
Start: 2019-10-23 | End: 2020-10-28 | Stop reason: SDUPTHER

## 2019-10-23 RX ORDER — EXENATIDE 2 MG/.85ML
INJECTION, SUSPENSION, EXTENDED RELEASE SUBCUTANEOUS
COMMUNITY
Start: 2019-09-22 | End: 2021-11-19

## 2019-10-23 RX ORDER — BLOOD-GLUCOSE METER
KIT MISCELLANEOUS
Refills: 3 | COMMUNITY
Start: 2019-07-31 | End: 2024-01-01

## 2019-10-23 RX ORDER — PEN NEEDLE, DIABETIC 32GX 5/32"
NEEDLE, DISPOSABLE MISCELLANEOUS
Refills: 4 | COMMUNITY
Start: 2019-08-09 | End: 2019-10-23

## 2019-10-23 ASSESSMENT — ENCOUNTER SYMPTOMS
WEAKNESS: 0
DIZZINESS: 0
PND: 0
PALPITATIONS: 0
ABDOMINAL PAIN: 0
CLAUDICATION: 0
SHORTNESS OF BREATH: 0
ORTHOPNEA: 0
COUGH: 0
MYALGIAS: 0

## 2019-10-23 NOTE — LETTER
"     [facility]   [address]  [place] [address]  Phone: [phone]  Fax: [phone]              Pradip Baez  1950    Encounter Date: 10/23/2019    SLIME Chatterjee          PROGRESS NOTE:  Chief Complaint   Patient presents with   • Coronary Artery Disease       Subjective:   Prdaip \"Nikolai\" Nestor is a 69 y.o. male who presents today to follow-up after nuclear stress test so that he can get his [facility] 's license.  He was last seen by Dr Jose Escoto on September 13, 2019 at which time the stress test was ordered.  Patient is here for the results.    He has a history of coronary artery disease having received a two-vessel bypass in 2015.  He has not needed any further revascularizations.  He is a type II diabetic with moderate control per his endocrinologist, Dr. Pradhan.    Patient states he feels very well.  He exercises 3 days a week doing the treadmill for 45 minutes and weights on the opposite days.  He tolerates this without any anginal symptoms.    Past Medical History:   Diagnosis Date   • CAD (coronary artery disease)    • Diabetes     oral meds, Dr Cornelius   • Diabetes ([facility])    • F/u of acute inferolateral myocardial infarction ([facility]) 2/7/2012   • Hyperlipidemia    • Hypertension    • Myocardial infarct ([facility]) 1/19/2012     Past Surgical History:   Procedure Laterality Date   • MULTIPLE CORONARY ARTERY BYPASS ENDO VEIN HARVEST  10/6/2015    Procedure: MULTIPLE CORONARY ARTERY BYPASS ENDO VEIN HARVEST X2;  Surgeon: Malcolm Lynn M.D.;  Location: SURGERY [facility];  Service:    • RECOVERY  10/2/2015    Procedure: CATH LAB [facility] WITH POSSIBLE WIEDENBECK;  Surgeon: Recoveryonmichael Surgery;  Location: SURGERY PRE-POST PROC [facility];  Service:    • OTHER CARDIAC SURGERY  1/19/2012    stent   • STENT PLACEMENT       Family History   Problem Relation Age of Onset   • Diabetes Mother    • Cancer Mother    • Heart Disease " Father         pacemaker   • Diabetes Father    • Heart Disease Sister         hole in heart     Social History     Socioeconomic History   • Marital status:      Spouse name: Not on file   • Number of children: Not on file   • Years of education: Not on file   • Highest education level: Not on file   Occupational History   • Not on file   Social Needs   • Financial resource strain: Not on file   • Food insecurity:     Worry: Not on file     Inability: Not on file   • Transportation needs:     Medical: Not on file     Non-medical: Not on file   Tobacco Use   • Smoking status: Never Smoker   • Smokeless tobacco: Never Used   Substance and Sexual Activity   • Alcohol use: Yes     Comment: occ   • Drug use: No   • Sexual activity: Not on file   Lifestyle   • Physical activity:     Days per week: Not on file     Minutes per session: Not on file   • Stress: Not on file   Relationships   • Social connections:     Talks on phone: Not on file     Gets together: Not on file     Attends [employer] service: Not on file     Active member of club or organization: Not on file     Attends meetings of clubs or organizations: Not on file     Relationship status: Not on file   • Intimate partner violence:     Fear of current or ex partner: Not on file     Emotionally abused: Not on file     Physically abused: Not on file     Forced sexual activity: Not on file   Other Topics Concern   • Not on file   Social History Narrative   • Not on file     No Known Allergies  Outpatient Encounter Medications as of 10/23/2019   Medication Sig Dispense Refill   • BYDUREON BCISE 2 MG/0.85ML Auto-injector      • FREESTYLE LITE strip TEST DAILY DIAGNOSIS E11.65 TYPE 2 DIABETES MELLITUS, NOT ON INSULIN.  3   • metoprolol (LOPRESSOR) 25 MG Tab Take 1 Tab by mouth 2 times a day. 180 Tab 3   • lisinopril (PRINIVIL) 5 MG Tab Take 1 Tab by mouth every evening. 90 Tab 3   • atorvastatin (LIPITOR) 40 MG Tab Take 1 Tab by mouth every evening. 90 Tab 3    "  • Exenatide ER (BYDUREON) 2 MG Pen-injector Inject  as instructed.     • tamsulosin (FLOMAX) 0.4 MG capsule Take 0.4 mg by mouth ONE-HALF HOUR AFTER BREAKFAST.     • aspirin EC 81 MG EC tablet Take 1 Tab by mouth every day. 30 Tab    • glimepiride (AMARYL) 4 MG Tab Take 4 mg by mouth 2 times a day.     • metformin ER (GLUCOPHAGE XR) 500 MG TABLET SR 24 HR Take 500 mg by mouth 2 times a day.     • Cyanocobalamin (VITAMIN B-12 PO) Take 1 Tab by mouth every day. 3000 IU SL     • [DISCONTINUED] BD PEN NEEDLE APRIL U/F FOR ONCE DAILY INJECTION  4   • [DISCONTINUED] TOUJEO SOLOSTAR 300 UNIT/ML Solution Pen-injector      • [DISCONTINUED] clopidogrel (PLAVIX) 75 MG Tab Take 1 Tab by mouth every day. 90 Tab 3   • [DISCONTINUED] metoprolol (LOPRESSOR) 25 MG Tab TAKE 1 TABLET BY MOUTH TWICE A  Tab 1   • [DISCONTINUED] lisinopril (PRINIVIL) 5 MG Tab TAKE 1 TABLET BY MOUTH EVERY DAY 90 Tab 1   • [DISCONTINUED] sitagliptin (JANUVIA) 100 MG Tab Take 100 mg by mouth every day.       No facility-administered encounter medications on file as of 10/23/2019.      Review of Systems   Constitutional: Negative for malaise/fatigue.   Respiratory: Negative for cough and shortness of breath.    Cardiovascular: Negative for chest pain, palpitations, orthopnea, claudication, leg swelling and PND.   Gastrointestinal: Negative for abdominal pain.   Musculoskeletal: Negative for myalgias.   Neurological: Negative for dizziness and weakness.        Objective:   /80 (BP Location: Left arm, Patient Position: Sitting, BP Cuff Size: Adult)   Pulse 80   Ht 1.727 m (5' 8\")   Wt 83 kg (183 lb)   SpO2 94%   BMI 27.83 kg/m²      Physical Exam   Constitutional: He is oriented to person, place, and time. He appears well-developed and well-nourished.   HENT:   Head: Normocephalic.   Eyes: EOM are normal.   Neck: Normal range of motion. No JVD present.   Cardiovascular: Normal rate and regular rhythm. Exam reveals no friction rub.   No " murmur heard.  Pulmonary/Chest: Effort normal.   Abdominal: Soft. Bowel sounds are normal.   Musculoskeletal: He exhibits no edema.   Neurological: He is alert and oriented to person, place, and time.   Skin: Skin is warm and dry.   Psychiatric: He has a normal mood and affect.     October 4, 2019: Myocardial Perfusion  Report   NUCLEAR IMAGING INTERPRETATION   Normal left ventricular size, ejection fraction, and wall motion.   There is a moderate region of infarct in the anterior lateral wall with    extension to the lateral apex consistent with scar.  There is no inducible ischemia.   ECG INTERPRETATION   Negative stress ECG for ischemia.  Maximum NP=533 % Max HR bjobszgl=472.0%    METs Achieved= 12.8   Total Exercise Time=10:01 Completed Protocol   Target HR Achieved      October 15, 2019: Comprehensive metabolic panels within normal limits with exception of glucose 126, creatinine 1.34, GFR 53, ALT 52.  Lipids: Cholesterol 134, triglycerides 191, HDL 38, LDL 58.      Assessment:     1. Multiple vessel coronary artery disease  metoprolol (LOPRESSOR) 25 MG Tab   2. S/P CABG x 2     3. Essential hypertension  metoprolol (LOPRESSOR) 25 MG Tab    lisinopril (PRINIVIL) 5 MG Tab   4. Dyslipidemia     5. Type 2 diabetes mellitus without complication, without long-term current use of insulin (HCC)         Medical Decision Making:  Today's Assessment / Status / Plan:   Coronary artery disease: status post bypass in 2015.  He is able to exercise strenuously without any anginal symptoms.  His nuclear stress test shows previous scar but he has a normal ejection fraction.  He achieved 12 METS which exceeds the FAA guidelines of 9METS.    He appears to be very fit and exercises regularly.  I feel that he may get his 's license.    Hypertension: Blood pressure is excellent in the office today.  Continue current medications.    Dyslipidemia: His lipids are to goal with his LDL 58 which is not considered too low.  He has a  very slight elevation of ALT which is not significant since it is not 2-3 times normal range.  He will continue on his statin.    Diabetes: Followed by Dr. Pradhan.  Considered to be in good control.    He is stable enough to follow-up with Dr Jose Escoto in 1 year.  He will follow-up sooner if problems.  We wish him luck in obtaining his 's license.    Collaborating Provider: Dr. Worthy.    Please note that this dictation was created using voice recognition software. I have made every reasonable attempt to correct obvious errors, but it is possible there are errors of grammar and possibly content that I did not discover before finalizing the note.          No Recipients

## 2019-10-23 NOTE — LETTER
[facility]   [address] [place] [address]  [place] [address]  Phone: [phone]  Fax: [phone]              Pradip Baez  1950    Encounter Date: 10/23/2019    SLIME Chatterjee          PROGRESS NOTE:  No notes on file      No Recipients

## 2019-10-23 NOTE — PROGRESS NOTES
"Chief Complaint   Patient presents with   • Coronary Artery Disease       Subjective:   Pradip \"Nikolai\" Nestor is a 69 y.o. male who presents today to follow-up after nuclear stress test so that he can get his [facility] 's license.  He was last seen by Dr Jose Escoto on September 13, 2019 at which time the stress test was ordered.  Patient is here for the results.    He has a history of coronary artery disease having received a two-vessel bypass in 2015.  He has not needed any further revascularizations.  He is a type II diabetic with moderate control per his endocrinologist, Dr. Pradhan.    Patient states he feels very well.  He exercises 3 days a week doing the treadmill for 45 minutes and weights on the opposite days.  He tolerates this without any anginal symptoms.    Past Medical History:   Diagnosis Date   • CAD (coronary artery disease)    • Diabetes     oral meds, Dr Cornelius   • Diabetes ([facility])    • F/u of acute inferolateral myocardial infarction ([facility]) 2/7/2012   • Hyperlipidemia    • Hypertension    • Myocardial infarct ([facility]) 1/19/2012     Past Surgical History:   Procedure Laterality Date   • MULTIPLE CORONARY ARTERY BYPASS ENDO VEIN HARVEST  10/6/2015    Procedure: MULTIPLE CORONARY ARTERY BYPASS ENDO VEIN HARVEST X2;  Surgeon: Malcolm Lynn M.D.;  Location: SURGERY [facility];  Service:    • RECOVERY  10/2/2015    Procedure: CATH LAB [facility] WITH POSSIBLE WIBOENBECK;  Surgeon: Recoveryonly Surgery;  Location: SURGERY PRE-POST PROC UNIT [facility];  Service:    • OTHER CARDIAC SURGERY  1/19/2012    stent   • STENT PLACEMENT       Family History   Problem Relation Age of Onset   • Diabetes Mother    • Cancer Mother    • Heart Disease Father         pacemaker   • Diabetes Father    • Heart Disease Sister         hole in heart     Social History     Socioeconomic History   • Marital status:      Spouse name: Not on file   • Number of children: Not on file   • Years of education: Not on file   • " Highest education level: Not on file   Occupational History   • Not on file   Social Needs   • Financial resource strain: Not on file   • Food insecurity:     Worry: Not on file     Inability: Not on file   • Transportation needs:     Medical: Not on file     Non-medical: Not on file   Tobacco Use   • Smoking status: Never Smoker   • Smokeless tobacco: Never Used   Substance and Sexual Activity   • Alcohol use: Yes     Comment: occ   • Drug use: No   • Sexual activity: Not on file   Lifestyle   • Physical activity:     Days per week: Not on file     Minutes per session: Not on file   • Stress: Not on file   Relationships   • Social connections:     Talks on phone: Not on file     Gets together: Not on file     Attends Yarsani service: Not on file     Active member of club or organization: Not on file     Attends meetings of clubs or organizations: Not on file     Relationship status: Not on file   • Intimate partner violence:     Fear of current or ex partner: Not on file     Emotionally abused: Not on file     Physically abused: Not on file     Forced sexual activity: Not on file   Other Topics Concern   • Not on file   Social History Narrative   • Not on file     No Known Allergies  Outpatient Encounter Medications as of 10/23/2019   Medication Sig Dispense Refill   • BYDUREON BCISE 2 MG/0.85ML Auto-injector      • FREESTYLE LITE strip TEST DAILY DIAGNOSIS E11.65 TYPE 2 DIABETES MELLITUS, NOT ON INSULIN.  3   • metoprolol (LOPRESSOR) 25 MG Tab Take 1 Tab by mouth 2 times a day. 180 Tab 3   • lisinopril (PRINIVIL) 5 MG Tab Take 1 Tab by mouth every evening. 90 Tab 3   • atorvastatin (LIPITOR) 40 MG Tab Take 1 Tab by mouth every evening. 90 Tab 3   • Exenatide ER (BYDUREON) 2 MG Pen-injector Inject  as instructed.     • tamsulosin (FLOMAX) 0.4 MG capsule Take 0.4 mg by mouth ONE-HALF HOUR AFTER BREAKFAST.     • aspirin EC 81 MG EC tablet Take 1 Tab by mouth every day. 30 Tab    • glimepiride (AMARYL) 4 MG Tab Take  "4 mg by mouth 2 times a day.     • metformin ER (GLUCOPHAGE XR) 500 MG TABLET SR 24 HR Take 500 mg by mouth 2 times a day.     • Cyanocobalamin (VITAMIN B-12 PO) Take 1 Tab by mouth every day. 3000 IU SL     • [DISCONTINUED] BD PEN NEEDLE APRIL U/F FOR ONCE DAILY INJECTION  4   • [DISCONTINUED] TOUJEO SOLOSTAR 300 UNIT/ML Solution Pen-injector      • [DISCONTINUED] clopidogrel (PLAVIX) 75 MG Tab Take 1 Tab by mouth every day. 90 Tab 3   • [DISCONTINUED] metoprolol (LOPRESSOR) 25 MG Tab TAKE 1 TABLET BY MOUTH TWICE A  Tab 1   • [DISCONTINUED] lisinopril (PRINIVIL) 5 MG Tab TAKE 1 TABLET BY MOUTH EVERY DAY 90 Tab 1   • [DISCONTINUED] sitagliptin (JANUVIA) 100 MG Tab Take 100 mg by mouth every day.       No facility-administered encounter medications on file as of 10/23/2019.      Review of Systems   Constitutional: Negative for malaise/fatigue.   Respiratory: Negative for cough and shortness of breath.    Cardiovascular: Negative for chest pain, palpitations, orthopnea, claudication, leg swelling and PND.   Gastrointestinal: Negative for abdominal pain.   Musculoskeletal: Negative for myalgias.   Neurological: Negative for dizziness and weakness.        Objective:   /80 (BP Location: Left arm, Patient Position: Sitting, BP Cuff Size: Adult)   Pulse 80   Ht 1.727 m (5' 8\")   Wt 83 kg (183 lb)   SpO2 94%   BMI 27.83 kg/m²     Physical Exam   Constitutional: He is oriented to person, place, and time. He appears well-developed and well-nourished.   HENT:   Head: Normocephalic.   Eyes: EOM are normal.   Neck: Normal range of motion. No JVD present.   Cardiovascular: Normal rate and regular rhythm. Exam reveals no friction rub.   No murmur heard.  Pulmonary/Chest: Effort normal.   Abdominal: Soft. Bowel sounds are normal.   Musculoskeletal: He exhibits no edema.   Neurological: He is alert and oriented to person, place, and time.   Skin: Skin is warm and dry.   Psychiatric: He has a normal mood and affect. "     October 4, 2019: Myocardial Perfusion  Report   NUCLEAR IMAGING INTERPRETATION   Normal left ventricular size, ejection fraction, and wall motion.   There is a moderate region of infarct in the anterior lateral wall with    extension to the lateral apex consistent with scar.  There is no inducible ischemia.   ECG INTERPRETATION   Negative stress ECG for ischemia.  Maximum CW=842 % Max HR hmftnyat=096.0%    METs Achieved= 12.8   Total Exercise Time=10:01 Completed Protocol   Target HR Achieved      October 15, 2019: Comprehensive metabolic panels within normal limits with exception of glucose 126, creatinine 1.34, GFR 53, ALT 52.  Lipids: Cholesterol 134, triglycerides 191, HDL 38, LDL 58.      Assessment:     1. Multiple vessel coronary artery disease  metoprolol (LOPRESSOR) 25 MG Tab   2. S/P CABG x 2     3. Essential hypertension  metoprolol (LOPRESSOR) 25 MG Tab    lisinopril (PRINIVIL) 5 MG Tab   4. Dyslipidemia     5. Type 2 diabetes mellitus without complication, without long-term current use of insulin ([facility])         Medical Decision Making:  Today's Assessment / Status / Plan:   Coronary artery disease: status post bypass in 2015.  He is able to exercise strenuously without any anginal symptoms.  His nuclear stress test shows previous scar but he has a normal ejection fraction.  He achieved 12 METS which exceeds the FAA guidelines of 9METS.    He appears to be very fit and exercises regularly.  I feel that he may get his 's license.    Hypertension: Blood pressure is excellent in the office today.  Continue current medications.    Dyslipidemia: His lipids are to goal with his LDL 58 which is not considered too low.  He has a very slight elevation of ALT which is not significant since it is not 2-3 times normal range.  He will continue on his statin.    Diabetes: Followed by Dr. Pradhan.  Considered to be in good control.    He is stable enough to follow-up with Dr Jose Escoto in 1 year.  He will  follow-up sooner if problems.  We wish him luck in obtaining his 's license.    Collaborating Provider: Dr. Worthy.    Please note that this dictation was created using voice recognition software. I have made every reasonable attempt to correct obvious errors, but it is possible there are errors of grammar and possibly content that I did not discover before finalizing the note.

## 2019-10-23 NOTE — TELEPHONE ENCOUNTER
Medication list updated.    Returned pt call and reviewed findings.  He is requesting for updated medication list to be sent to his [facility] Dr. Jose Denis (P: [phone]).  Reassurance given that this office will contact Dr. Denis's office to update pt medication list.  He states no other concerns or questions at this time and is appreciative of information given.     Attempted to call Dr. Denis's office, however office is closed.  Will attempt to contact Dr. Denis's office at a later time.

## 2019-10-23 NOTE — TELEPHONE ENCOUNTER
SM    Patient called in to advise they no longer take the metformin ER (GLUCOPHAGE XR) 500 MG TABLET SR 24 HR. Please remove from the patients list. Please call the patient back at [phone].    Thank you,  Justine JACOBSON

## 2019-10-23 NOTE — LETTER
"     [facility]   [address], [address]  [place] [address]  Phone: [phone]  Fax: [phone]              Pradip Baez  1950    Encounter Date: 10/23/2019    SLIME Chatterjee          PROGRESS NOTE:  Chief Complaint   Patient presents with   • Coronary Artery Disease       Subjective:   Pradip \"Nikolai\" Nestor is a 69 y.o. male who presents today to follow-up after nuclear stress test so that he can get his [facility] 's license.  He was last seen by Dr Jose Escoto on September 13, 2019 at which time the stress test was ordered.  Patient is here for the results.    He has a history of coronary artery disease having received a two-vessel bypass in 2015.  He has not needed any further revascularizations.  He is a type II diabetic with moderate control per his endocrinologist, Dr. Pradhan.    Patient states he feels very well.  He exercises 3 days a week doing the treadmill for 45 minutes and weights on the opposite days.  He tolerates this without any anginal symptoms.    Past Medical History:   Diagnosis Date   • CAD (coronary artery disease)    • Diabetes     oral meds, Dr Cornelius   • Diabetes ([facility])    • F/u of acute inferolateral myocardial infarction ([facility]) 2/7/2012   • Hyperlipidemia    • Hypertension    • Myocardial infarct ([facility]) 1/19/2012     Past Surgical History:   Procedure Laterality Date   • MULTIPLE CORONARY ARTERY BYPASS ENDO VEIN HARVEST  10/6/2015    Procedure: MULTIPLE CORONARY ARTERY BYPASS ENDO VEIN HARVEST X2;  Surgeon: Malcolm Lynn M.D.;  Location: SURGERY [facility];  Service:    • RECOVERY  10/2/2015    Procedure: CATH LAB [facility] WITH POSSIBLE WIEDENBECK;  Surgeon: Recoveryonmichael Surgery;  Location: SURGERY PRE-POST PROC UNIT [facility];  Service:    • OTHER CARDIAC SURGERY  1/19/2012    stent   • STENT PLACEMENT       Family History   Problem Relation Age of Onset   • Diabetes Mother    • Cancer Mother    • Heart Disease " Father         pacemaker   • Diabetes Father    • Heart Disease Sister         hole in heart     Social History     Socioeconomic History   • Marital status:      Spouse name: Not on file   • Number of children: Not on file   • Years of education: Not on file   • Highest education level: Not on file   Occupational History   • Not on file   Social Needs   • Financial resource strain: Not on file   • Food insecurity:     Worry: Not on file     Inability: Not on file   • Transportation needs:     Medical: Not on file     Non-medical: Not on file   Tobacco Use   • Smoking status: Never Smoker   • Smokeless tobacco: Never Used   Substance and Sexual Activity   • Alcohol use: Yes     Comment: occ   • Drug use: No   • Sexual activity: Not on file   Lifestyle   • Physical activity:     Days per week: Not on file     Minutes per session: Not on file   • Stress: Not on file   Relationships   • Social connections:     Talks on phone: Not on file     Gets together: Not on file     Attends Yazidism service: Not on file     Active member of club or organization: Not on file     Attends meetings of clubs or organizations: Not on file     Relationship status: Not on file   • Intimate partner violence:     Fear of current or ex partner: Not on file     Emotionally abused: Not on file     Physically abused: Not on file     Forced sexual activity: Not on file   Other Topics Concern   • Not on file   Social History Narrative   • Not on file     No Known Allergies  Outpatient Encounter Medications as of 10/23/2019   Medication Sig Dispense Refill   • BYDUREON BCISE 2 MG/0.85ML Auto-injector      • FREESTYLE LITE strip TEST DAILY DIAGNOSIS E11.65 TYPE 2 DIABETES MELLITUS, NOT ON INSULIN.  3   • metoprolol (LOPRESSOR) 25 MG Tab Take 1 Tab by mouth 2 times a day. 180 Tab 3   • lisinopril (PRINIVIL) 5 MG Tab Take 1 Tab by mouth every evening. 90 Tab 3   • atorvastatin (LIPITOR) 40 MG Tab Take 1 Tab by mouth every evening. 90 Tab 3    "  • Exenatide ER (BYDUREON) 2 MG Pen-injector Inject  as instructed.     • tamsulosin (FLOMAX) 0.4 MG capsule Take 0.4 mg by mouth ONE-HALF HOUR AFTER BREAKFAST.     • aspirin EC 81 MG EC tablet Take 1 Tab by mouth every day. 30 Tab    • glimepiride (AMARYL) 4 MG Tab Take 4 mg by mouth 2 times a day.     • metformin ER (GLUCOPHAGE XR) 500 MG TABLET SR 24 HR Take 500 mg by mouth 2 times a day.     • Cyanocobalamin (VITAMIN B-12 PO) Take 1 Tab by mouth every day. 3000 IU SL     • [DISCONTINUED] BD PEN NEEDLE APRIL U/F FOR ONCE DAILY INJECTION  4   • [DISCONTINUED] TOUJEO SOLOSTAR 300 UNIT/ML Solution Pen-injector      • [DISCONTINUED] clopidogrel (PLAVIX) 75 MG Tab Take 1 Tab by mouth every day. 90 Tab 3   • [DISCONTINUED] metoprolol (LOPRESSOR) 25 MG Tab TAKE 1 TABLET BY MOUTH TWICE A  Tab 1   • [DISCONTINUED] lisinopril (PRINIVIL) 5 MG Tab TAKE 1 TABLET BY MOUTH EVERY DAY 90 Tab 1   • [DISCONTINUED] sitagliptin (JANUVIA) 100 MG Tab Take 100 mg by mouth every day.       No facility-administered encounter medications on file as of 10/23/2019.      Review of Systems   Constitutional: Negative for malaise/fatigue.   Respiratory: Negative for cough and shortness of breath.    Cardiovascular: Negative for chest pain, palpitations, orthopnea, claudication, leg swelling and PND.   Gastrointestinal: Negative for abdominal pain.   Musculoskeletal: Negative for myalgias.   Neurological: Negative for dizziness and weakness.        Objective:   /80 (BP Location: Left arm, Patient Position: Sitting, BP Cuff Size: Adult)   Pulse 80   Ht 1.727 m (5' 8\")   Wt 83 kg (183 lb)   SpO2 94%   BMI 27.83 kg/m²      Physical Exam   Constitutional: He is oriented to person, place, and time. He appears well-developed and well-nourished.   HENT:   Head: Normocephalic.   Eyes: EOM are normal.   Neck: Normal range of motion. No JVD present.   Cardiovascular: Normal rate and regular rhythm. Exam reveals no friction rub.   No " murmur heard.  Pulmonary/Chest: Effort normal.   Abdominal: Soft. Bowel sounds are normal.   Musculoskeletal: He exhibits no edema.   Neurological: He is alert and oriented to person, place, and time.   Skin: Skin is warm and dry.   Psychiatric: He has a normal mood and affect.     October 4, 2019: Myocardial Perfusion  Report   NUCLEAR IMAGING INTERPRETATION   Normal left ventricular size, ejection fraction, and wall motion.   There is a moderate region of infarct in the anterior lateral wall with    extension to the lateral apex consistent with scar.  There is no inducible ischemia.   ECG INTERPRETATION   Negative stress ECG for ischemia.  Maximum EC=033 % Max HR hehilmvj=149.0%    METs Achieved= 12.8   Total Exercise Time=10:01 Completed Protocol   Target HR Achieved      October 15, 2019: Comprehensive metabolic panels within normal limits with exception of glucose 126, creatinine 1.34, GFR 53, ALT 52.  Lipids: Cholesterol 134, triglycerides 191, HDL 38, LDL 58.      Assessment:     1. Multiple vessel coronary artery disease  metoprolol (LOPRESSOR) 25 MG Tab   2. S/P CABG x 2     3. Essential hypertension  metoprolol (LOPRESSOR) 25 MG Tab    lisinopril (PRINIVIL) 5 MG Tab   4. Dyslipidemia     5. Type 2 diabetes mellitus without complication, without long-term current use of insulin (HCC)         Medical Decision Making:  Today's Assessment / Status / Plan:   Coronary artery disease: status post bypass in 2015.  He is able to exercise strenuously without any anginal symptoms.  His nuclear stress test shows previous scar but he has a normal ejection fraction.  He achieved 12 METS which exceeds the FAA guidelines of 9METS.    He appears to be very fit and exercises regularly.  I feel that he may get his 's license.    Hypertension: Blood pressure is excellent in the office today.  Continue current medications.    Dyslipidemia: His lipids are to goal with his LDL 58 which is not considered too low.  He has a  very slight elevation of ALT which is not significant since it is not 2-3 times normal range.  He will continue on his statin.    Diabetes: Followed by Dr. Pradhan.  Considered to be in good control.    He is stable enough to follow-up with Dr Jose Escoto in 1 year.  He will follow-up sooner if problems.  We wish him luck in obtaining his 's license.    Collaborating Provider: Dr. Worthy.    Please note that this dictation was created using voice recognition software. I have made every reasonable attempt to correct obvious errors, but it is possible there are errors of grammar and possibly content that I did not discover before finalizing the note.          No Recipients

## 2019-10-24 NOTE — TELEPHONE ENCOUNTER
Called Dr. Denis's office and reviewed findings.  Per MA, pt had called the office with findings and they have received a current list of medications without Metformin noted.  Per MA, they have noted the changes and will be ok by the FAA.

## 2020-10-17 DIAGNOSIS — I10 ESSENTIAL HYPERTENSION: ICD-10-CM

## 2020-10-17 DIAGNOSIS — I25.10 MULTIPLE VESSEL CORONARY ARTERY DISEASE: ICD-10-CM

## 2020-10-28 ENCOUNTER — OFFICE VISIT (OUTPATIENT)
Dept: CARDIOLOGY | Facility: MEDICAL CENTER | Age: 70
End: 2020-10-28
Payer: MEDICARE

## 2020-10-28 VITALS
RESPIRATION RATE: 16 BRPM | WEIGHT: 189.6 LBS | OXYGEN SATURATION: 94 % | DIASTOLIC BLOOD PRESSURE: 80 MMHG | SYSTOLIC BLOOD PRESSURE: 122 MMHG | BODY MASS INDEX: 28.73 KG/M2 | HEIGHT: 68 IN | HEART RATE: 80 BPM

## 2020-10-28 DIAGNOSIS — Z95.1 S/P CABG X 2: ICD-10-CM

## 2020-10-28 DIAGNOSIS — E78.5 DYSLIPIDEMIA: ICD-10-CM

## 2020-10-28 DIAGNOSIS — I25.10 MULTIPLE VESSEL CORONARY ARTERY DISEASE: ICD-10-CM

## 2020-10-28 DIAGNOSIS — I10 ESSENTIAL HYPERTENSION: ICD-10-CM

## 2020-10-28 PROCEDURE — 99213 OFFICE O/P EST LOW 20 MIN: CPT | Performed by: NURSE PRACTITIONER

## 2020-10-28 RX ORDER — INSULIN GLARGINE 300 U/ML
70 INJECTION, SOLUTION SUBCUTANEOUS DAILY
COMMUNITY
Start: 2020-10-17

## 2020-10-28 RX ORDER — ATORVASTATIN CALCIUM 40 MG/1
40 TABLET, FILM COATED ORAL EVERY EVENING
Qty: 90 TAB | Refills: 3 | Status: SHIPPED | OUTPATIENT
Start: 2020-10-28 | End: 2021-09-28

## 2020-10-28 RX ORDER — PEN NEEDLE, DIABETIC 32GX 5/32"
NEEDLE, DISPOSABLE MISCELLANEOUS
COMMUNITY
Start: 2020-10-06 | End: 2020-10-28

## 2020-10-28 RX ORDER — LISINOPRIL 5 MG/1
5 TABLET ORAL EVERY EVENING
Qty: 90 TAB | Refills: 3 | Status: SHIPPED | OUTPATIENT
Start: 2020-10-28 | End: 2021-11-19

## 2020-10-28 ASSESSMENT — ENCOUNTER SYMPTOMS
DIZZINESS: 0
MYALGIAS: 0
COUGH: 0
PND: 0
CLAUDICATION: 0
PALPITATIONS: 0
ORTHOPNEA: 0
FEVER: 0
ABDOMINAL PAIN: 0
SHORTNESS OF BREATH: 0

## 2020-10-28 NOTE — PROGRESS NOTES
Chief Complaint   Patient presents with   • Hyperlipidemia       Subjective:   Pradip Baez is a 70 y.o. male who presents today to follow up on his CAD, HTN and HLD.    Patient of Dr. Escoto.  He was last seen in clinic on 10/23/2019 with PHYLLIS Oliver.  No changes were made during that visit.    Over the year, patient reports feeling fairly well.  He denies any chest pain, palpitations, orthopnea, PND, edema, shortness of breath or dizziness/lightheadedness.    Patient does continue to do some mild exercise.  He does some weightlifting about 2 times per week, he states he no longer walks on the treadmill.  He has been doing projects and chores around his home since the COVID-19 pandemic.  Patient was also furloughed from his job.    Patient reports he ran out of clopidogrel about a month ago.    Patient continues to be followed by Dr. Pradhan for his diabetes.  His last A1c was 11.  He is trying to straighten out his diet before additional medication adjustments.    Patient reports he was previously taking Jardiance, but it was discontinued for an unknown reason.    Additonally, patient has the following medical problems:    -CAD with Stent in 2012 then CABG x 2 (LIMA to distal diagonal, RSVG to distal intramyocardial LAD) in 10/6/2015    -HTN: Takes lisinopril 5 mg daily and metoprolol 25 mg twice daily    -Hyperlipidemia: Takes atorvastatin 40 mg daily    -Diabetes: followed by Endocrinology      Past Medical History:   Diagnosis Date   • CAD (coronary artery disease)    • Diabetes     oral meds, Dr Cornelius   • Diabetes (HCC)    • F/u of acute inferolateral myocardial infarction (HCC) 2/7/2012   • Hyperlipidemia    • Hypertension    • Myocardial infarct (HCC) 1/19/2012     Past Surgical History:   Procedure Laterality Date   • MULTIPLE CORONARY ARTERY BYPASS ENDO VEIN HARVEST  10/6/2015    Procedure: MULTIPLE CORONARY ARTERY BYPASS ENDO VEIN HARVEST X2;  Surgeon: Malcolm Lynn M.D.;  Location: SURGERY  [facility];  Service:    • RECOVERY  10/2/2015    Procedure: CATH LAB C WITH POSSIBLE WIEDENBECK;  Surgeon: Recoveryonly Surgery;  [place] [facility] [place]  Service:    • OTHER CARDIAC SURGERY  1/19/2012    stent   • STENT PLACEMENT       Family History   Problem Relation Age of Onset   • Diabetes Mother    • Cancer Mother    • Heart Disease Father         pacemaker   • Diabetes Father    • Heart Disease Sister         hole in heart     Social History     Socioeconomic History   • Marital status:      Spouse name: Not on file   • Number of children: Not on file   • Years of education: Not on file   • Highest education level: Not on file   Occupational History   • Not on file   Social Needs   • Financial resource strain: Not on file   • Food insecurity     Worry: Not on file     Inability: Not on file   • Transportation needs     Medical: Not on file     Non-medical: Not on file   Tobacco Use   • Smoking status: Never Smoker   • Smokeless tobacco: Never Used   Substance and Sexual Activity   • Alcohol use: Yes     Comment: occ   • Drug use: No   • Sexual activity: Not on file   Lifestyle   • Physical activity     Days per week: Not on file     Minutes per session: Not on file   • Stress: Not on file   Relationships   • Social connections     Talks on phone: Not on file     Gets together: Not on file     Attends Gnosticism service: Not on file     Active member of club or organization: Not on file     Attends meetings of clubs or organizations: Not on file     Relationship status: Not on file   • Intimate partner violence     Fear of current or ex partner: Not on file     Emotionally abused: Not on file     Physically abused: Not on file     Forced sexual activity: Not on file   Other Topics Concern   • Not on file   Social History Narrative   • Not on file     No Known Allergies  Outpatient Encounter Medications as of 10/28/2020   Medication Sig Dispense Refill   • atorvastatin (LIPITOR)  "40 MG Tab Take 1 Tab by mouth every evening. 90 Tab 3   • lisinopril (PRINIVIL) 5 MG Tab Take 1 Tab by mouth every evening. 90 Tab 3   • metoprolol (LOPRESSOR) 25 MG Tab Take 1 Tab by mouth 2 times a day. TAKE 1 TABLET BY MOUTH TWICE A  Tab 3   • BYDUREON BCISE 2 MG/0.85ML Auto-injector      • FREESTYLE LITE strip TEST DAILY DIAGNOSIS E11.65 TYPE 2 DIABETES MELLITUS, NOT ON INSULIN.  3   • Exenatide ER (BYDUREON) 2 MG Pen-injector Inject  as instructed.     • tamsulosin (FLOMAX) 0.4 MG capsule Take 0.4 mg by mouth ONE-HALF HOUR AFTER BREAKFAST.     • aspirin EC 81 MG EC tablet Take 1 Tab by mouth every day. 30 Tab    • Cyanocobalamin (VITAMIN B-12 PO) Take 1 Tab by mouth every day. 3000 IU SL     • TOUJEO SOLOSTAR 300 UNIT/ML Solution Pen-injector INJECT 45 UNITS SUBCUTAEOUSLY EVERY DAY     • [DISCONTINUED] BD PEN NEEDLE APRIL U/F FOR ONCE DAILY INJECTION     • [DISCONTINUED] metoprolol (LOPRESSOR) 25 MG Tab TAKE 1 TABLET BY MOUTH TWICE A DAY 60 Tab 0   • [DISCONTINUED] lisinopril (PRINIVIL) 5 MG Tab Take 1 Tab by mouth every evening. 90 Tab 3   • [DISCONTINUED] atorvastatin (LIPITOR) 40 MG Tab Take 1 Tab by mouth every evening. 90 Tab 3   • [DISCONTINUED] glimepiride (AMARYL) 4 MG Tab Take 4 mg by mouth 2 times a day.       No facility-administered encounter medications on file as of 10/28/2020.      Review of Systems   Constitutional: Negative for fever and malaise/fatigue.   Respiratory: Negative for cough and shortness of breath.    Cardiovascular: Negative for chest pain, palpitations, orthopnea, claudication, leg swelling and PND.   Gastrointestinal: Negative for abdominal pain.   Musculoskeletal: Negative for myalgias.   Neurological: Negative for dizziness.   All other systems reviewed and are negative.       Objective:   /80 (BP Location: Left arm, Patient Position: Sitting, BP Cuff Size: Adult)   Pulse 80   Resp 16   Ht 1.727 m (5' 8\")   Wt 86 kg (189 lb 9.6 oz)   SpO2 94%   BMI 28.83 " kg/m²     Physical Exam   Constitutional: He is oriented to person, place, and time. He appears well-developed and well-nourished.   HENT:   Head: Normocephalic and atraumatic.   Eyes: Pupils are equal, round, and reactive to light. EOM are normal.   Neck: Normal range of motion. Neck supple. No JVD present.   Cardiovascular: Normal rate, regular rhythm and normal heart sounds.   Pulmonary/Chest: Effort normal and breath sounds normal. No respiratory distress. He has no wheezes. He has no rales.   Abdominal: Soft. Bowel sounds are normal.   Musculoskeletal:         General: No edema.   Neurological: He is alert and oriented to person, place, and time.   Skin: Skin is warm and dry.   Psychiatric: He has a normal mood and affect. His behavior is normal.   Vitals reviewed.    Lab Results   Component Value Date/Time    CHOLSTRLTOT 156 07/16/2018 08:37 AM    LDL Comment 07/16/2018 08:37 AM    HDL 30 (L) 07/16/2018 08:37 AM    TRIGLYCERIDE 426 (H) 07/16/2018 08:37 AM       Lab Results   Component Value Date/Time    SODIUM 140 07/16/2018 08:37 AM    SODIUM 133 (L) 10/11/2015 01:40 AM    POTASSIUM 5.6 (H) 07/16/2018 08:37 AM    POTASSIUM 3.8 10/11/2015 01:40 AM    CHLORIDE 105 07/16/2018 08:37 AM    CHLORIDE 98 10/11/2015 01:40 AM    CO2 17 (L) 07/16/2018 08:37 AM    CO2 28 10/11/2015 01:40 AM    GLUCOSE 223 (H) 07/16/2018 08:37 AM    GLUCOSE 186 (H) 10/11/2015 01:40 AM    BUN 24 07/16/2018 08:37 AM    BUN 22 10/11/2015 01:40 AM    CREATININE 1.48 (H) 07/16/2018 08:37 AM    CREATININE 1.21 10/11/2015 01:40 AM    BUNCREATRAT 16 07/16/2018 08:37 AM     Lab Results   Component Value Date/Time    ALKPHOSPHAT 48 07/16/2018 08:37 AM    ALKPHOSPHAT 40 10/05/2015 03:01 PM    ASTSGOT 21 07/16/2018 08:37 AM    ASTSGOT 15 10/05/2015 03:01 PM    ALTSGPT 32 07/16/2018 08:37 AM    ALTSGPT 34 10/05/2015 03:01 PM    TBILIRUBIN 0.3 07/16/2018 08:37 AM    TBILIRUBIN 0.5 10/05/2015 03:01 PM      Stress echo 5/14/2012  CONCLUSIONS  Large area  of inferolateral -apical severe hypokinesis at rest and post   exercise  Mid anterior wall appears hypokinetic post exercise  suggesting   Ischemia    Stress echo 2/3/2014  CONCLUSIONS  Negative stress echocardiogram for ischemia.  Normal resting left ventricular systolic function with ejection   fraction of 60-65%.  Good exercise tolerance.  No arrythmias detected.  Chest pain was not experienced during this study    Stress Echo Report 9/17/2015  Abnormal stress echocardiography indicating mid-distal anterior wall ischemia with posterolateral scar.  Good exercise capacity    Heart Cath 10/2/15  CONCLUSIONS:  1.  High-grade distal right posterolateral and right posterior descending   artery disease.  2.  High-grade 80% proximal LAD/D1 bifurcation disease, Lopez 1, 1, 0.  3.  Mild in-stent restenosis of the left circumflex bare metal stent.  4.  FFR of the left circumflex is normal at 0.95.       Treadmill stress test 5/3/2016  Provider conclusions and analysis: Normal ECG stress treadmill  test without evidence of ischemia.  Good functional capacity.    Transthoracic Echo Report 5/6/2016  Normal left ventricular size, wall thickness, and systolic function.  Left ventricular ejection fraction is visually estimated to be 60%.  Grade I diastolic dysfunction.  Mildly dilated right ventricle.  Mild mitral regurgitation.  Mild tricuspid regurgitation.  Right ventricular systolic pressure is estimated to be 30 mmHg.      Myocardial Perfusion Report 9/16/2016   NUCLEAR IMAGING INTERPRETATION   Inferolateral infarction with erum-infarct ischemia.   The estimated ejection fraction is 47%.   Inferolateral hypokinesis.   Exercise capacity very good at 10.1 METS.   ECG INTERPRETATION   Negative stress ECG for ischemia. Exercise capacity very good at 10.1 METS.    Treadmill stress test 3/14/2017  Provider conclusions and analysis: Baseline ECG:Normal ECG,  inferior infarct age undetermined, possible anterior-lateral  infarct age  undetermined  Stress ECG: No ischemic T wave changes with peak stress  Impression: Normal stress ECG      Myocardial Perfusion Report 10/4/2019   NUCLEAR IMAGING INTERPRETATION   Normal left ventricular size, ejection fraction, and wall motion.   There is a moderate region of infarct in the anterior lateral wall with extension to the lateral apex.  There is no inducible ischemia.   ECG INTERPRETATION   Negative stress ECG for ischemia.  Assessment:     1. S/P CABG x 2  Comp Metabolic Panel    Lipid Profile    atorvastatin (LIPITOR) 40 MG Tab   2. Multiple vessel coronary artery disease  Comp Metabolic Panel    Lipid Profile    atorvastatin (LIPITOR) 40 MG Tab    metoprolol (LOPRESSOR) 25 MG Tab   3. Essential hypertension  Comp Metabolic Panel    Lipid Profile    lisinopril (PRINIVIL) 5 MG Tab    metoprolol (LOPRESSOR) 25 MG Tab   4. Dyslipidemia  Comp Metabolic Panel    Lipid Profile    atorvastatin (LIPITOR) 40 MG Tab       Medical Decision Making:  Today's Assessment / Status / Plan:   1. CAD, hx CABG x 2/dyslipidemia: Last LDL 58 on 10/15/2019  -Discussed with patient that he no longer needs to take clopidogrel  -Continue aspirin 81 mg daily  -Continue atorvastatin 40 mg nightly  -Patient to obtain lipid panel and CMP with his labs due next month  -Patient was previously on Jardiance, recommend restarting Jardiance or similar to reduce his cardiac risk factors.  Patient to discuss again with Dr. Pradhan    2.  Hypertension: Stable  -Continue lisinopril 5 mg daily  -Continue metoprolol 25 mg twice a day    3.  Diabetes:  -Last A1c was 11 on 8/14/2020  -Patient is followed by endocrinology  -Reinforced with patient the importance of adequate blood sugar control due to his history of CAD and CABG.  -Patient will follow up next month    FU in clinic in 1 year with Dr. Escoto. Sooner if needed.    Patient verbalizes understanding and agrees with the plan of care.     PLEASE NOTE: This Note was created using voice  recognition Software. I have made every reasonable attempt to correct obvious errors, but I expect that there are errors of grammar and possibly content that I did not discover before finalizing the note

## 2020-11-20 LAB
ALBUMIN SERPL-MCNC: 4.6 G/DL (ref 3.7–4.7)
ALBUMIN/GLOB SERPL: 1.9 {RATIO} (ref 1.2–2.2)
ALP SERPL-CCNC: 53 IU/L (ref 39–117)
ALT SERPL-CCNC: 52 IU/L (ref 0–44)
AST SERPL-CCNC: 33 IU/L (ref 0–40)
BILIRUB SERPL-MCNC: 0.4 MG/DL (ref 0–1.2)
BUN SERPL-MCNC: 25 MG/DL (ref 8–27)
BUN/CREAT SERPL: 20 (ref 10–24)
CALCIUM SERPL-MCNC: 9.8 MG/DL (ref 8.6–10.2)
CHLORIDE SERPL-SCNC: 106 MMOL/L (ref 96–106)
CHOLEST SERPL-MCNC: 150 MG/DL (ref 100–199)
CO2 SERPL-SCNC: 23 MMOL/L (ref 20–29)
CREAT SERPL-MCNC: 1.23 MG/DL (ref 0.76–1.27)
GLOBULIN SER CALC-MCNC: 2.4 G/DL (ref 1.5–4.5)
GLUCOSE SERPL-MCNC: 141 MG/DL (ref 65–99)
HDLC SERPL-MCNC: 33 MG/DL
LABORATORY COMMENT REPORT: ABNORMAL
LDLC SERPL CALC-MCNC: 69 MG/DL (ref 0–99)
POTASSIUM SERPL-SCNC: 3.9 MMOL/L (ref 3.5–5.2)
PROT SERPL-MCNC: 7 G/DL (ref 6–8.5)
SODIUM SERPL-SCNC: 142 MMOL/L (ref 134–144)
TRIGL SERPL-MCNC: 303 MG/DL (ref 0–149)
VLDLC SERPL CALC-MCNC: 48 MG/DL (ref 5–40)

## 2021-01-15 DIAGNOSIS — Z23 NEED FOR VACCINATION: ICD-10-CM

## 2021-09-28 DIAGNOSIS — E78.5 DYSLIPIDEMIA: ICD-10-CM

## 2021-09-28 DIAGNOSIS — Z95.1 S/P CABG X 2: ICD-10-CM

## 2021-09-28 DIAGNOSIS — I10 ESSENTIAL HYPERTENSION: ICD-10-CM

## 2021-09-28 DIAGNOSIS — I25.10 MULTIPLE VESSEL CORONARY ARTERY DISEASE: ICD-10-CM

## 2021-09-28 RX ORDER — ATORVASTATIN CALCIUM 40 MG/1
40 TABLET, FILM COATED ORAL EVERY EVENING
Qty: 30 TABLET | Refills: 0 | Status: SHIPPED | OUTPATIENT
Start: 2021-09-28 | End: 2021-11-19 | Stop reason: SDUPTHER

## 2021-09-28 NOTE — LETTER
September 28, 2021        Pradip Baez  [address] [place] [address]        Dear Pradip:        We received a medication refill request, and it looks like you're due for an annual visit. In order to continue filling your medications safely, please call [phone] to schedule a visit with YOLANDA Agosto.     Let us know if you have any questions.    Thank you,  Jane MCLAUGHLIN          Electronically Signed

## 2021-11-19 ENCOUNTER — OFFICE VISIT (OUTPATIENT)
Dept: CARDIOLOGY | Facility: MEDICAL CENTER | Age: 71
End: 2021-11-19
Payer: MEDICARE

## 2021-11-19 VITALS
HEART RATE: 78 BPM | WEIGHT: 183 LBS | DIASTOLIC BLOOD PRESSURE: 94 MMHG | SYSTOLIC BLOOD PRESSURE: 142 MMHG | HEIGHT: 68 IN | BODY MASS INDEX: 27.74 KG/M2 | RESPIRATION RATE: 16 BRPM | OXYGEN SATURATION: 93 %

## 2021-11-19 DIAGNOSIS — E78.5 DYSLIPIDEMIA: ICD-10-CM

## 2021-11-19 DIAGNOSIS — Z95.1 S/P CABG X 2: ICD-10-CM

## 2021-11-19 DIAGNOSIS — I10 ESSENTIAL HYPERTENSION: ICD-10-CM

## 2021-11-19 DIAGNOSIS — I25.10 MULTIPLE VESSEL CORONARY ARTERY DISEASE: ICD-10-CM

## 2021-11-19 PROCEDURE — 99214 OFFICE O/P EST MOD 30 MIN: CPT | Performed by: NURSE PRACTITIONER

## 2021-11-19 RX ORDER — INSULIN LISPRO 100 [IU]/ML
INJECTION, SOLUTION INTRAVENOUS; SUBCUTANEOUS
Status: ON HOLD | COMMUNITY
Start: 2021-11-10 | End: 2024-01-01

## 2021-11-19 RX ORDER — ATORVASTATIN CALCIUM 40 MG/1
40 TABLET, FILM COATED ORAL EVERY EVENING
Qty: 90 TABLET | Refills: 3 | Status: SHIPPED | OUTPATIENT
Start: 2021-11-19 | End: 2022-10-27

## 2021-11-19 RX ORDER — GLIMEPIRIDE 4 MG/1
4 TABLET ORAL 2 TIMES DAILY
COMMUNITY
Start: 2021-11-01

## 2021-11-19 RX ORDER — PEN NEEDLE, DIABETIC 32GX 5/32"
NEEDLE, DISPOSABLE MISCELLANEOUS
COMMUNITY
Start: 2021-11-08 | End: 2024-01-01

## 2021-11-19 ASSESSMENT — ENCOUNTER SYMPTOMS
CLAUDICATION: 0
DIZZINESS: 0
PND: 0
PALPITATIONS: 0
ORTHOPNEA: 0
FEVER: 0
COUGH: 0
ABDOMINAL PAIN: 0
SHORTNESS OF BREATH: 0
MYALGIAS: 0

## 2021-11-19 NOTE — PROGRESS NOTES
Chief Complaint   Patient presents with   • Dyslipidemia   • Hypertension     F/V Dx: Essential hypertension   • Coronary Artery Bypass Graft (CABG)     F/V Dx: S/P CABG x 2       Subjective     Pradip Baez is a 71 y.o. male who presents today for follow up on  to follow up on his CAD, HTN and HLD.     Patient of Dr. Escoto.  He was last seen in clinic on 10/28/2020.  During that visit, discussed with patient that he does not need to take clopidogrel.      Patient reports doing well over the year. Patient feels fairly well, denies chest pain, shortness of breath, palpitations, dizziness/lightheadedness, orthopnea, PND or Edema.     He reports he has not been exercising as much as he should and has gotten off the schedule.  He states after he got his Covid vaccine he has been a little bit more fatigued.     He reports he believes Dr. Pradhan discontinued his lisinopril for unknown reasons.    Patient reports he was previously taking Jardiance, but it was discontinued for an unknown reason.     Additonally, patient has the following medical problems:     -CAD with Stent in 2012 then CABG x 2 (LIMA to distal diagonal, RSVG to distal intramyocardial LAD) in 10/6/2015     -HTN: Takes metoprolol 25 mg twice daily     -Hyperlipidemia: Takes atorvastatin 40 mg daily     -Diabetes: followed by Endocrinology       Past Medical History:   Diagnosis Date   • CAD (coronary artery disease)    • Diabetes     oral meds, Dr Cornelius   • Diabetes (HCC)    • F/u of acute inferolateral myocardial infarction (HCC) 2/7/2012   • Hyperlipidemia    • Hypertension    • Myocardial infarct (HCC) 1/19/2012     Past Surgical History:   Procedure Laterality Date   • MULTIPLE CORONARY ARTERY BYPASS ENDO VEIN HARVEST  10/6/2015    Procedure: MULTIPLE CORONARY ARTERY BYPASS ENDO VEIN HARVEST X2;  Surgeon: Malcolm Lynn M.D.;  Location: SURGERY [facility];  Service:    • RECOVERY  10/2/2015    Procedure: CATH LAB [facility] WITH POSSIBLE  VERN;  Surgeon: Recoveryonly Surgery;  [place] [facility] [place]  Service:    • OTHER CARDIAC SURGERY  1/19/2012    stent   • STENT PLACEMENT       Family History   Problem Relation Age of Onset   • Diabetes Mother    • Cancer Mother    • Heart Disease Father         pacemaker   • Diabetes Father    • Heart Disease Sister         hole in heart     Social History     Socioeconomic History   • Marital status:      Spouse name: Not on file   • Number of children: Not on file   • Years of education: Not on file   • Highest education level: Not on file   Occupational History   • Not on file   Tobacco Use   • Smoking status: Never Smoker   • Smokeless tobacco: Never Used   Vaping Use   • Vaping Use: Never used   Substance and Sexual Activity   • Alcohol use: Yes     Comment: occ   • Drug use: No   • Sexual activity: Not on file   Other Topics Concern   • Not on file   Social History Narrative   • Not on file     Social Determinants of Health     Financial Resource Strain:    • Difficulty of Paying Living Expenses: Not on file   Food Insecurity:    • Worried About Running Out of Food in the Last Year: Not on file   • Ran Out of Food in the Last Year: Not on file   Transportation Needs:    • Lack of Transportation (Medical): Not on file   • Lack of Transportation (Non-Medical): Not on file   Physical Activity:    • Days of Exercise per Week: Not on file   • Minutes of Exercise per Session: Not on file   Stress:    • Feeling of Stress : Not on file   Social Connections:    • Frequency of Communication with Friends and Family: Not on file   • Frequency of Social Gatherings with Friends and Family: Not on file   • Attends Congregational Services: Not on file   • Active Member of Clubs or Organizations: Not on file   • Attends Club or Organization Meetings: Not on file   • Marital Status: Not on file   Intimate Partner Violence:    • Fear of Current or Ex-Partner: Not on file   • Emotionally Abused: Not  on file   • Physically Abused: Not on file   • Sexually Abused: Not on file   Housing Stability:    • Unable to Pay for Housing in the Last Year: Not on file   • Number of Places Lived in the Last Year: Not on file   • Unstable Housing in the Last Year: Not on file     No Known Allergies  Outpatient Encounter Medications as of 11/19/2021   Medication Sig Dispense Refill   • glimepiride (AMARYL) 4 MG Tab Take 4 mg by mouth 2 times a day.     • HUMALOG KWIKPEN 100 UNIT/ML Solution Pen-injector injection PEN 10 Units every day.     • BD PEN NEEDLE APRIL U/F      • atorvastatin (LIPITOR) 40 MG Tab Take 1 Tablet by mouth every evening. 90 Tablet 3   • metoprolol tartrate (LOPRESSOR) 25 MG Tab Take 1 Tablet by mouth 2 times a day. 180 Tablet 3   • TOUJEO SOLOSTAR 300 UNIT/ML Solution Pen-injector 45 Units every day.     • FREESTYLE LITE strip TEST DAILY DIAGNOSIS E11.65 TYPE 2 DIABETES MELLITUS, NOT ON INSULIN.  3   • Exenatide ER (BYDUREON) 2 MG Pen-injector Inject  under the skin every 7 days.     • tamsulosin (FLOMAX) 0.4 MG capsule Take 0.4 mg by mouth ONE-HALF HOUR AFTER BREAKFAST.     • aspirin EC 81 MG EC tablet Take 1 Tab by mouth every day. 30 Tab    • Cyanocobalamin (VITAMIN B-12 PO) Take 1 Tab by mouth every day. 3000 IU SL     • [DISCONTINUED] metoprolol tartrate (LOPRESSOR) 25 MG Tab Take 1 Tablet by mouth 2 times a day. **PT NEEDS TO BE SEEN BEFORE ANYMORE REFILLS CAN BE MADE, PLEASE CALL [phone]** 180 Tablet 0   • [DISCONTINUED] atorvastatin (LIPITOR) 40 MG Tab Take 1 Tablet by mouth every evening. **PT NEEDS TO BE SEEN BEFORE ANYMORE REFILLS CAN BE MADE, PLEASE CALL [phone]** 30 Tablet 0   • [DISCONTINUED] lisinopril (PRINIVIL) 5 MG Tab Take 1 Tab by mouth every evening. (Patient not taking: Reported on 11/19/2021) 90 Tab 3   • [DISCONTINUED] BYDUREON BCISE 2 MG/0.85ML Auto-injector  (Patient not taking: Reported on 11/19/2021)       No facility-administered encounter medications on file as of  "11/19/2021.     Review of Systems   Constitutional: Positive for malaise/fatigue. Negative for fever.   Respiratory: Negative for cough and shortness of breath.    Cardiovascular: Negative for chest pain, palpitations, orthopnea, claudication, leg swelling and PND.   Gastrointestinal: Negative for abdominal pain.   Musculoskeletal: Negative for myalgias.   Neurological: Negative for dizziness.   All other systems reviewed and are negative.             Objective     /94 (BP Location: Left arm, Patient Position: Sitting, BP Cuff Size: Adult)   Pulse 78   Resp 16   Ht 1.727 m (5' 8\")   Wt 83 kg (183 lb)   SpO2 93%   BMI 27.83 kg/m²     Physical Exam  Vitals reviewed.   Constitutional:       Appearance: He is well-developed.   HENT:      Head: Normocephalic and atraumatic.   Eyes:      Pupils: Pupils are equal, round, and reactive to light.   Neck:      Vascular: No JVD.   Cardiovascular:      Rate and Rhythm: Normal rate and regular rhythm.      Heart sounds: Normal heart sounds.   Pulmonary:      Effort: Pulmonary effort is normal. No respiratory distress.      Breath sounds: Normal breath sounds. No wheezing or rales.   Abdominal:      General: Bowel sounds are normal.      Palpations: Abdomen is soft.   Musculoskeletal:         General: Normal range of motion.      Cervical back: Normal range of motion and neck supple.      Right lower leg: No edema.      Left lower leg: No edema.   Skin:     General: Skin is warm and dry.   Neurological:      General: No focal deficit present.      Mental Status: He is alert and oriented to person, place, and time.   Psychiatric:         Behavior: Behavior normal.       Lab Results   Component Value Date/Time    CHOLSTRLTOT 150 11/18/2020 11:38 AM    LDL Comment 07/16/2018 08:37 AM    HDL 33 (L) 11/18/2020 11:38 AM    TRIGLYCERIDE 303 (H) 11/18/2020 11:38 AM       Lab Results   Component Value Date/Time    SODIUM 142 11/18/2020 11:38 AM    SODIUM 133 (L) 10/11/2015 " 01:40 AM    POTASSIUM 3.9 11/18/2020 11:38 AM    POTASSIUM 3.8 10/11/2015 01:40 AM    CHLORIDE 106 11/18/2020 11:38 AM    CHLORIDE 98 10/11/2015 01:40 AM    CO2 23 11/18/2020 11:38 AM    CO2 28 10/11/2015 01:40 AM    GLUCOSE 141 (H) 11/18/2020 11:38 AM    GLUCOSE 186 (H) 10/11/2015 01:40 AM    BUN 25 11/18/2020 11:38 AM    BUN 22 10/11/2015 01:40 AM    CREATININE 1.23 11/18/2020 11:38 AM    CREATININE 1.21 10/11/2015 01:40 AM    BUNCREATRAT 20 11/18/2020 11:38 AM     Lab Results   Component Value Date/Time    ALKPHOSPHAT 53 11/18/2020 11:38 AM    ALKPHOSPHAT 40 10/05/2015 03:01 PM    ASTSGOT 33 11/18/2020 11:38 AM    ASTSGOT 15 10/05/2015 03:01 PM    ALTSGPT 52 (H) 11/18/2020 11:38 AM    ALTSGPT 34 10/05/2015 03:01 PM    TBILIRUBIN 0.4 11/18/2020 11:38 AM    TBILIRUBIN 0.5 10/05/2015 03:01 PM      Stress echo 5/14/2012  CONCLUSIONS  Large area of inferolateral -apical severe hypokinesis at rest and post   exercise  Mid anterior wall appears hypokinetic post exercise  suggesting   Ischemia     Stress echo 2/3/2014  CONCLUSIONS  Negative stress echocardiogram for ischemia.  Normal resting left ventricular systolic function with ejection   fraction of 60-65%.  Good exercise tolerance.  No arrythmias detected.  Chest pain was not experienced during this study     Stress Echo Report 9/17/2015  Abnormal stress echocardiography indicating mid-distal anterior wall ischemia with posterolateral scar.  Good exercise capacity     Heart Cath 10/2/15  CONCLUSIONS:  1.  High-grade distal right posterolateral and right posterior descending   artery disease.  2.  High-grade 80% proximal LAD/D1 bifurcation disease, Lopez 1, 1, 0.  3.  Mild in-stent restenosis of the left circumflex bare metal stent.  4.  FFR of the left circumflex is normal at 0.95.        Treadmill stress test 5/3/2016  Provider conclusions and analysis: Normal ECG stress treadmill  test without evidence of ischemia.  Good functional capacity.     Transthoracic  Echo Report 5/6/2016  Normal left ventricular size, wall thickness, and systolic function.  Left ventricular ejection fraction is visually estimated to be 60%.  Grade I diastolic dysfunction.  Mildly dilated right ventricle.  Mild mitral regurgitation.  Mild tricuspid regurgitation.  Right ventricular systolic pressure is estimated to be 30 mmHg.       Myocardial Perfusion Report 9/16/2016   NUCLEAR IMAGING INTERPRETATION   Inferolateral infarction with erum-infarct ischemia.   The estimated ejection fraction is 47%.   Inferolateral hypokinesis.   Exercise capacity very good at 10.1 METS.   ECG INTERPRETATION   Negative stress ECG for ischemia. Exercise capacity very good at 10.1 METS.    Treadmill stress test 3/14/2017  Provider conclusions and analysis: Baseline ECG:Normal ECG,  inferior infarct age undetermined, possible anterior-lateral  infarct age undetermined  Stress ECG: No ischemic T wave changes with peak stress  Impression: Normal stress ECG        Myocardial Perfusion Report 10/4/2019   NUCLEAR IMAGING INTERPRETATION   Normal left ventricular size, ejection fraction, and wall motion.   There is a moderate region of infarct in the anterior lateral wall with extension to the lateral apex.  There is no inducible ischemia.   ECG INTERPRETATION   Negative stress ECG for ischemia.           Assessment & Plan     1. Multiple vessel coronary artery disease  Comp Metabolic Panel    Lipid Profile    CBC WITH DIFFERENTIAL    atorvastatin (LIPITOR) 40 MG Tab    metoprolol tartrate (LOPRESSOR) 25 MG Tab   2. Essential hypertension  Comp Metabolic Panel    Lipid Profile    CBC WITH DIFFERENTIAL    metoprolol tartrate (LOPRESSOR) 25 MG Tab   3. S/P CABG x 2  Comp Metabolic Panel    Lipid Profile    CBC WITH DIFFERENTIAL    atorvastatin (LIPITOR) 40 MG Tab   4. Dyslipidemia  Comp Metabolic Panel    Lipid Profile    CBC WITH DIFFERENTIAL    atorvastatin (LIPITOR) 40 MG Tab       Medical Decision Making: Today's  Assessment/Status/Plan:        1. CAD, hx CABG x 2/dyslipidemia: Last LDL  69 on 11/18/2020  -Continue aspirin 81 mg daily  -Continue atorvastatin 40 mg nightly  -Patient to get lipid panel and CMP done with labs due for endocrinology in January  -Patient was previously on Jardiance, discontinued for unknown reasons      2.  Hypertension: Stable  -BP borderline, unsure why lisinopril discontinued  -Continue metoprolol 25 mg twice a day  -Monitor and log Blood pressures at home. Call office or RTC if BP increasing or >180/100 or if symptoms of elevated blood pressure present. Reviewed s/sx of stroke and heart attack. Patient to go to ER or call 911 if present.      3.  Diabetes:  -Patient is followed by endocrinology  -Continue current recommendations    CBC, CMP and lipid panel due with next labs in January.  We will also try to request most recent labs from [facility].     FU in clinic in 1 year with Dr. Escoto or myself. Sooner if needed.     Patient verbalizes understanding and agrees with the plan of care.      PLEASE NOTE: This Note was created using voice recognition Software. I have made every reasonable attempt to correct obvious errors, but I expect that there are errors of grammar and possibly content that I did not discover before finalizing the note

## 2022-03-04 LAB
ALBUMIN SERPL-MCNC: 4.3 G/DL (ref 3.7–4.7)
ALBUMIN/GLOB SERPL: 1.7 {RATIO} (ref 1.2–2.2)
ALP SERPL-CCNC: 60 IU/L (ref 44–121)
ALT SERPL-CCNC: 42 IU/L (ref 0–44)
AST SERPL-CCNC: 30 IU/L (ref 0–40)
BASOPHILS # BLD AUTO: 0.1 X10E3/UL (ref 0–0.2)
BASOPHILS NFR BLD AUTO: 1 %
BILIRUB SERPL-MCNC: 0.7 MG/DL (ref 0–1.2)
BUN SERPL-MCNC: 11 MG/DL (ref 8–27)
BUN/CREAT SERPL: 10 (ref 10–24)
CALCIUM SERPL-MCNC: 9.4 MG/DL (ref 8.6–10.2)
CHLORIDE SERPL-SCNC: 103 MMOL/L (ref 96–106)
CHOLEST SERPL-MCNC: 139 MG/DL (ref 100–199)
CO2 SERPL-SCNC: 21 MMOL/L (ref 20–29)
CREAT SERPL-MCNC: 1.05 MG/DL (ref 0.76–1.27)
EGFRCR SERPLBLD CKD-EPI 2021: 75 ML/MIN/1.73
EOSINOPHIL # BLD AUTO: 0.1 X10E3/UL (ref 0–0.4)
EOSINOPHIL NFR BLD AUTO: 2 %
ERYTHROCYTE [DISTWIDTH] IN BLOOD BY AUTOMATED COUNT: 13 % (ref 11.6–15.4)
GLOBULIN SER CALC-MCNC: 2.6 G/DL (ref 1.5–4.5)
GLUCOSE SERPL-MCNC: 110 MG/DL (ref 65–99)
HCT VFR BLD AUTO: 43.6 % (ref 37.5–51)
HDLC SERPL-MCNC: 30 MG/DL
HGB BLD-MCNC: 15.5 G/DL (ref 13–17.7)
IMM GRANULOCYTES # BLD AUTO: 0 X10E3/UL (ref 0–0.1)
IMM GRANULOCYTES NFR BLD AUTO: 0 %
IMMATURE CELLS  115398: NORMAL
LABORATORY COMMENT REPORT: ABNORMAL
LDLC SERPL CALC-MCNC: 64 MG/DL (ref 0–99)
LYMPHOCYTES # BLD AUTO: 1.7 X10E3/UL (ref 0.7–3.1)
LYMPHOCYTES NFR BLD AUTO: 22 %
MCH RBC QN AUTO: 32.3 PG (ref 26.6–33)
MCHC RBC AUTO-ENTMCNC: 35.6 G/DL (ref 31.5–35.7)
MCV RBC AUTO: 91 FL (ref 79–97)
MONOCYTES # BLD AUTO: 0.4 X10E3/UL (ref 0.1–0.9)
MONOCYTES NFR BLD AUTO: 5 %
MORPHOLOGY BLD-IMP: NORMAL
NEUTROPHILS # BLD AUTO: 5.5 X10E3/UL (ref 1.4–7)
NEUTROPHILS NFR BLD AUTO: 70 %
NRBC BLD AUTO-RTO: NORMAL %
PLATELET # BLD AUTO: 181 X10E3/UL (ref 150–450)
POTASSIUM SERPL-SCNC: 3.7 MMOL/L (ref 3.5–5.2)
PROT SERPL-MCNC: 6.9 G/DL (ref 6–8.5)
RBC # BLD AUTO: 4.8 X10E6/UL (ref 4.14–5.8)
SODIUM SERPL-SCNC: 141 MMOL/L (ref 134–144)
TRIGL SERPL-MCNC: 283 MG/DL (ref 0–149)
VLDLC SERPL CALC-MCNC: 45 MG/DL (ref 5–40)
WBC # BLD AUTO: 7.7 X10E3/UL (ref 3.4–10.8)

## 2022-10-25 DIAGNOSIS — Z95.1 S/P CABG X 2: ICD-10-CM

## 2022-10-25 DIAGNOSIS — I25.10 MULTIPLE VESSEL CORONARY ARTERY DISEASE: ICD-10-CM

## 2022-10-25 DIAGNOSIS — E78.5 DYSLIPIDEMIA: ICD-10-CM

## 2022-10-26 NOTE — TELEPHONE ENCOUNTER
Is the patient due for a refill? Yes    Was the patient seen the past year? Yes    Date of last office visit: 09/28/21    Does the patient have an upcoming appointment?  Yes   If yes, When? 11/30/22    Provider to refill: MARY    Does the patients insurance require a 100 day supply?  No

## 2022-10-27 RX ORDER — ATORVASTATIN CALCIUM 40 MG/1
TABLET, FILM COATED ORAL
Qty: 90 TABLET | Refills: 0 | Status: SHIPPED | OUTPATIENT
Start: 2022-10-27 | End: 2022-11-30 | Stop reason: SDUPTHER

## 2022-11-02 ENCOUNTER — PATIENT MESSAGE (OUTPATIENT)
Dept: HEALTH INFORMATION MANAGEMENT | Facility: OTHER | Age: 72
End: 2022-11-02

## 2022-11-30 ENCOUNTER — OFFICE VISIT (OUTPATIENT)
Dept: CARDIOLOGY | Facility: MEDICAL CENTER | Age: 72
End: 2022-11-30
Payer: MEDICARE

## 2022-11-30 VITALS
RESPIRATION RATE: 15 BRPM | HEIGHT: 68 IN | OXYGEN SATURATION: 95 % | SYSTOLIC BLOOD PRESSURE: 128 MMHG | WEIGHT: 190 LBS | DIASTOLIC BLOOD PRESSURE: 84 MMHG | BODY MASS INDEX: 28.79 KG/M2 | HEART RATE: 81 BPM

## 2022-11-30 DIAGNOSIS — I25.10 MULTIPLE VESSEL CORONARY ARTERY DISEASE: ICD-10-CM

## 2022-11-30 DIAGNOSIS — Z95.1 S/P CABG X 2: ICD-10-CM

## 2022-11-30 DIAGNOSIS — E78.5 DYSLIPIDEMIA: ICD-10-CM

## 2022-11-30 DIAGNOSIS — I10 ESSENTIAL HYPERTENSION: ICD-10-CM

## 2022-11-30 DIAGNOSIS — E11.9 TYPE 2 DIABETES MELLITUS WITHOUT COMPLICATION, WITHOUT LONG-TERM CURRENT USE OF INSULIN (HCC): ICD-10-CM

## 2022-11-30 PROCEDURE — 99214 OFFICE O/P EST MOD 30 MIN: CPT | Performed by: NURSE PRACTITIONER

## 2022-11-30 RX ORDER — SEMAGLUTIDE 1.34 MG/ML
0.5 INJECTION, SOLUTION SUBCUTANEOUS
COMMUNITY
Start: 2022-11-17

## 2022-11-30 RX ORDER — ATORVASTATIN CALCIUM 40 MG/1
40 TABLET, FILM COATED ORAL EVERY EVENING
Qty: 100 TABLET | Refills: 3 | Status: SHIPPED | OUTPATIENT
Start: 2022-11-30 | End: 2023-01-01

## 2022-11-30 ASSESSMENT — FIBROSIS 4 INDEX: FIB4 SCORE: 1.89

## 2022-11-30 ASSESSMENT — ENCOUNTER SYMPTOMS
PND: 0
DIZZINESS: 0
FEVER: 0
SHORTNESS OF BREATH: 0
ABDOMINAL PAIN: 0
CLAUDICATION: 0
COUGH: 0
PALPITATIONS: 0
ORTHOPNEA: 0
MYALGIAS: 0

## 2022-12-01 NOTE — PROGRESS NOTES
Chief Complaint   Patient presents with    Hypertension     F/V DX: Essential hypertension     Hyperlipidemia     F/V DX: Hyperlipidemia, unspecified hyperlipidemia type        Subjective     Pradip Baez is a 72 y.o. male who presents today for follow up on  to follow up on his CAD, HTN and HLD.     Patient of Dr. Escoto.  He was last seen in clinic on 11/19/2021.  No changes were made during that visit.     Patient feels well, denies chest pain, shortness of breath, palpitations, dizziness/lightheadedness, orthopnea, PND or Edema.      Patient continues to workout every other day for about 30 minutes doing push-ups, curls, sit ups.    He continues to follow-up with Dr. Pradhan for his diabetes and has had some changes to his medications.     Additonally, patient has the following medical problems:     -CAD with Stent in 2012 then CABG x 2 (LIMA to distal diagonal, RSVG to distal intramyocardial LAD) in 10/6/2015     -HTN: Takes metoprolol 25 mg twice daily     -Hyperlipidemia: Takes atorvastatin 40 mg daily     -Diabetes: followed by Endocrinology       Past Medical History:   Diagnosis Date    CAD (coronary artery disease)     Diabetes     oral meds, Dr Cornelius    Diabetes ([facility])     F/u of acute inferolateral myocardial infarction ([facility]) 2/7/2012    Hyperlipidemia     Hypertension     Myocardial infarct ([facility]) 1/19/2012     Past Surgical History:   Procedure Laterality Date    MULTIPLE CORONARY ARTERY BYPASS ENDO VEIN HARVEST  10/6/2015    Procedure: MULTIPLE CORONARY ARTERY BYPASS ENDO VEIN HARVEST X2;  Surgeon: Malcolm Lynn M.D.;  Location: SURGERY [facility];  Service:     RECOVERY  10/2/2015    Procedure: CATH LAB [facility] WITH POSSIBLE [place]K;  Surgeon: Jesus Surgery;  [place] [facility] [place]  Service:     OTHER CARDIAC SURGERY  1/19/2012    stent    STENT PLACEMENT       Family History   Problem Relation Age of Onset    Diabetes Mother     Cancer Mother     Heart Disease  Father         pacemaker    Diabetes Father     Heart Disease Sister         hole in heart     Social History     Socioeconomic History    Marital status:      Spouse name: Not on file    Number of children: Not on file    Years of education: Not on file    Highest education level: Not on file   Occupational History    Not on file   Tobacco Use    Smoking status: Never    Smokeless tobacco: Never   Vaping Use    Vaping Use: Never used   Substance and Sexual Activity    Alcohol use: Yes     Comment: occ    Drug use: No    Sexual activity: Not on file   Other Topics Concern    Not on file   Social History Narrative    Not on file     Social Determinants of Health     Financial Resource Strain: Not on file   Food Insecurity: Not on file   Transportation Needs: Not on file   Physical Activity: Not on file   Stress: Not on file   Social Connections: Not on file   Intimate Partner Violence: Not on file   Housing Stability: Not on file     No Known Allergies  Outpatient Encounter Medications as of 11/30/2022   Medication Sig Dispense Refill    OZEMPIC, 0.25 OR 0.5 MG/DOSE, 2 MG/1.5ML Solution Pen-injector INJECT 0.5 MG SUBCUTANEOUSLY EVERY WEEK      atorvastatin (LIPITOR) 40 MG Tab Take 1 Tablet by mouth every evening. 100 Tablet 3    metoprolol tartrate (LOPRESSOR) 25 MG Tab Take 1 Tablet by mouth 2 times a day. 200 Tablet 3    glimepiride (AMARYL) 4 MG Tab Take 4 mg by mouth 2 times a day.      HUMALOG KWIKPEN 100 UNIT/ML Solution Pen-injector injection PEN 10 Units every day.      BD PEN NEEDLE APRIL U/F       TOUJEO SOLOSTAR 300 UNIT/ML Solution Pen-injector 45 Units every day.      FREESTYLE LITE strip TEST DAILY DIAGNOSIS E11.65 TYPE 2 DIABETES MELLITUS, NOT ON INSULIN.  3    tamsulosin (FLOMAX) 0.4 MG capsule Take 0.4 mg by mouth ONE-HALF HOUR AFTER BREAKFAST.      aspirin EC 81 MG EC tablet Take 1 Tab by mouth every day. 30 Tab     Cyanocobalamin (VITAMIN B-12 PO) Take 1 Tab by mouth every day. 3000 IU SL    "   [DISCONTINUED] atorvastatin (LIPITOR) 40 MG Tab TAKE 1 TABLET BY MOUTH EVERY DAY IN THE EVENING 90 Tablet 0    [DISCONTINUED] metoprolol tartrate (LOPRESSOR) 25 MG Tab Take 1 Tablet by mouth 2 times a day. 180 Tablet 2    [DISCONTINUED] Exenatide ER 2 MG Pen-injector Inject  under the skin every 7 days. (Patient not taking: Reported on 11/30/2022)       No facility-administered encounter medications on file as of 11/30/2022.     Review of Systems   Constitutional:  Negative for fever and malaise/fatigue.   Respiratory:  Negative for cough and shortness of breath.    Cardiovascular:  Negative for chest pain, palpitations, orthopnea, claudication, leg swelling and PND.   Gastrointestinal:  Negative for abdominal pain.   Musculoskeletal:  Negative for myalgias.   Neurological:  Negative for dizziness.   All other systems reviewed and are negative.           Objective     /84 (BP Location: Left arm, Patient Position: Sitting, BP Cuff Size: Adult)   Pulse 81   Resp 15   Ht 1.727 m (5' 8\")   Wt 86.2 kg (190 lb)   SpO2 95%   BMI 28.89 kg/m²     Physical Exam  Vitals reviewed.   Constitutional:       Appearance: He is well-developed.   HENT:      Head: Normocephalic and atraumatic.   Eyes:      Pupils: Pupils are equal, round, and reactive to light.   Neck:      Vascular: No JVD.   Cardiovascular:      Rate and Rhythm: Normal rate and regular rhythm.      Heart sounds: Normal heart sounds.   Pulmonary:      Effort: Pulmonary effort is normal. No respiratory distress.      Breath sounds: Normal breath sounds. No wheezing or rales.   Abdominal:      General: Bowel sounds are normal.      Palpations: Abdomen is soft.   Musculoskeletal:         General: Normal range of motion.      Cervical back: Normal range of motion and neck supple.      Right lower leg: No edema.      Left lower leg: No edema.   Skin:     General: Skin is warm and dry.   Neurological:      General: No focal deficit present.      Mental " Status: He is alert and oriented to person, place, and time.   Psychiatric:         Behavior: Behavior normal.     Lab Results   Component Value Date/Time    CHOLSTRLTOT 139 03/03/2022 04:10 AM    LDL Comment 07/16/2018 08:37 AM    HDL 30 (L) 03/03/2022 04:10 AM    TRIGLYCERIDE 283 (H) 03/03/2022 04:10 AM       Lab Results   Component Value Date/Time    SODIUM 141 03/03/2022 04:10 AM    SODIUM 133 (L) 10/11/2015 01:40 AM    POTASSIUM 3.7 03/03/2022 04:10 AM    POTASSIUM 3.8 10/11/2015 01:40 AM    CHLORIDE 103 03/03/2022 04:10 AM    CHLORIDE 98 10/11/2015 01:40 AM    CO2 21 03/03/2022 04:10 AM    CO2 28 10/11/2015 01:40 AM    GLUCOSE 110 (H) 03/03/2022 04:10 AM    GLUCOSE 186 (H) 10/11/2015 01:40 AM    BUN 11 03/03/2022 04:10 AM    BUN 22 10/11/2015 01:40 AM    CREATININE 1.05 03/03/2022 04:10 AM    CREATININE 1.21 10/11/2015 01:40 AM    BUNCREATRAT 10 03/03/2022 04:10 AM     Lab Results   Component Value Date/Time    ALKPHOSPHAT 60 03/03/2022 04:10 AM    ALKPHOSPHAT 40 10/05/2015 03:01 PM    ASTSGOT 30 03/03/2022 04:10 AM    ASTSGOT 15 10/05/2015 03:01 PM    ALTSGPT 42 03/03/2022 04:10 AM    ALTSGPT 34 10/05/2015 03:01 PM    TBILIRUBIN 0.7 03/03/2022 04:10 AM    TBILIRUBIN 0.5 10/05/2015 03:01 PM      Stress echo 5/14/2012  CONCLUSIONS  Large area of inferolateral -apical severe hypokinesis at rest and post   exercise  Mid anterior wall appears hypokinetic post exercise  suggesting   Ischemia     Stress echo 2/3/2014  CONCLUSIONS  Negative stress echocardiogram for ischemia.  Normal resting left ventricular systolic function with ejection   fraction of 60-65%.  Good exercise tolerance.  No arrythmias detected.  Chest pain was not experienced during this study     Stress Echo Report 9/17/2015  Abnormal stress echocardiography indicating mid-distal anterior wall ischemia with posterolateral scar.  Good exercise capacity     Heart Cath 10/2/15  CONCLUSIONS:  1.  High-grade distal right posterolateral and right  posterior descending   artery disease.  2.  High-grade 80% proximal LAD/D1 bifurcation disease, Lopez 1, 1, 0.  3.  Mild in-stent restenosis of the left circumflex bare metal stent.  4.  FFR of the left circumflex is normal at 0.95.        Treadmill stress test 5/3/2016  Provider conclusions and analysis: Normal ECG stress treadmill  test without evidence of ischemia.  Good functional capacity.     Transthoracic Echo Report 5/6/2016  Normal left ventricular size, wall thickness, and systolic function.  Left ventricular ejection fraction is visually estimated to be 60%.  Grade I diastolic dysfunction.  Mildly dilated right ventricle.  Mild mitral regurgitation.  Mild tricuspid regurgitation.  Right ventricular systolic pressure is estimated to be 30 mmHg.       Myocardial Perfusion Report 9/16/2016   NUCLEAR IMAGING INTERPRETATION   Inferolateral infarction with erum-infarct ischemia.   The estimated ejection fraction is 47%.   Inferolateral hypokinesis.   Exercise capacity very good at 10.1 METS.   ECG INTERPRETATION   Negative stress ECG for ischemia. Exercise capacity very good at 10.1 METS.    Treadmill stress test 3/14/2017  Provider conclusions and analysis: Baseline ECG:Normal ECG,  inferior infarct age undetermined, possible anterior-lateral  infarct age undetermined  Stress ECG: No ischemic T wave changes with peak stress  Impression: Normal stress ECG        Myocardial Perfusion Report 10/4/2019   NUCLEAR IMAGING INTERPRETATION   Normal left ventricular size, ejection fraction, and wall motion.   There is a moderate region of infarct in the anterior lateral wall with extension to the lateral apex.  There is no inducible ischemia.   ECG INTERPRETATION   Negative stress ECG for ischemia.           Assessment & Plan     1. Multiple vessel coronary artery disease  Comp Metabolic Panel    Lipid Profile    atorvastatin (LIPITOR) 40 MG Tab    metoprolol tartrate (LOPRESSOR) 25 MG Tab      2. S/P CABG x 2  Comp  Metabolic Panel    Lipid Profile    atorvastatin (LIPITOR) 40 MG Tab      3. Dyslipidemia  Comp Metabolic Panel    Lipid Profile    atorvastatin (LIPITOR) 40 MG Tab      4. Essential hypertension  Comp Metabolic Panel    Lipid Profile    metoprolol tartrate (LOPRESSOR) 25 MG Tab      5. Type 2 diabetes mellitus without complication, without long-term current use of insulin (HCC)            Medical Decision Making: Today's Assessment/Status/Plan:        1. CAD, hx CABG x 2/dyslipidemia: Last LDL  69 on 11/18/2020  -Continue aspirin 81 mg daily  -Continue atorvastatin 40 mg nightly  -Patient was previously on Jardiance, discontinued for unknown reasons   -Patient to add CMP and lipid panel to his labs for his endocrinologist     2.  Hypertension: Stable  -Continue metoprolol 25 mg twice a day  -Monitor and log Blood pressures at home. Call office or RTC if BP increasing or >180/100 or if symptoms of elevated blood pressure present. Reviewed s/sx of stroke and heart attack. Patient to go to ER or call 911 if present.      3.  Diabetes:  -Patient is followed by endocrinology  -Continue current recommendations    CMP and lipid panel due with next labs.    FU in clinic in 1 year with Dr. Escoto or myself. Sooner if needed.     Patient verbalizes understanding and agrees with the plan of care.      PLEASE NOTE: This Note was created using voice recognition Software. I have made every reasonable attempt to correct obvious errors, but I expect that there are errors of grammar and possibly content that I did not discover before finalizing the note

## 2022-12-30 ENCOUNTER — TELEPHONE (OUTPATIENT)
Dept: HEALTH INFORMATION MANAGEMENT | Facility: OTHER | Age: 72
End: 2022-12-30

## 2022-12-30 NOTE — TELEPHONE ENCOUNTER
Outcome: Left Message for patient to schedule WITH A PCP    Please transfer to [facility] at [phone] when patient returns call.      Attempt # 1

## 2023-01-01 ENCOUNTER — HOSPITAL ENCOUNTER (OUTPATIENT)
Dept: LAB | Facility: MEDICAL CENTER | Age: 73
End: 2023-11-10
Attending: OTOLARYNGOLOGY
Payer: MEDICARE

## 2023-01-01 ENCOUNTER — HOSPITAL ENCOUNTER (OUTPATIENT)
Dept: RADIOLOGY | Facility: MEDICAL CENTER | Age: 73
End: 2023-11-28
Attending: OTOLARYNGOLOGY
Payer: MEDICARE

## 2023-01-01 ENCOUNTER — APPOINTMENT (OUTPATIENT)
Dept: RADIATION ONCOLOGY | Facility: MEDICAL CENTER | Age: 73
End: 2023-01-01
Attending: RADIOLOGY
Payer: MEDICARE

## 2023-01-01 ENCOUNTER — PATIENT MESSAGE (OUTPATIENT)
Dept: HEALTH INFORMATION MANAGEMENT | Facility: OTHER | Age: 73
End: 2023-01-01

## 2023-01-01 ENCOUNTER — APPOINTMENT (OUTPATIENT)
Dept: CARDIOLOGY | Facility: MEDICAL CENTER | Age: 73
End: 2023-01-01
Attending: NURSE PRACTITIONER
Payer: MEDICARE

## 2023-01-01 ENCOUNTER — OFFICE VISIT (OUTPATIENT)
Dept: CARDIOLOGY | Facility: MEDICAL CENTER | Age: 73
End: 2023-01-01
Payer: MEDICARE

## 2023-01-01 ENCOUNTER — HOSPITAL ENCOUNTER (OUTPATIENT)
Dept: RADIOLOGY | Facility: MEDICAL CENTER | Age: 73
End: 2023-11-15
Attending: OTOLARYNGOLOGY
Payer: MEDICARE

## 2023-01-01 VITALS
SYSTOLIC BLOOD PRESSURE: 124 MMHG | WEIGHT: 174 LBS | RESPIRATION RATE: 16 BRPM | DIASTOLIC BLOOD PRESSURE: 70 MMHG | BODY MASS INDEX: 26.37 KG/M2 | OXYGEN SATURATION: 100 % | HEART RATE: 75 BPM | HEIGHT: 68 IN

## 2023-01-01 DIAGNOSIS — E78.5 DYSLIPIDEMIA: ICD-10-CM

## 2023-01-01 DIAGNOSIS — I25.10 MULTIPLE VESSEL CORONARY ARTERY DISEASE: ICD-10-CM

## 2023-01-01 DIAGNOSIS — I10 ESSENTIAL HYPERTENSION: ICD-10-CM

## 2023-01-01 DIAGNOSIS — Z95.1 S/P CABG X 2: ICD-10-CM

## 2023-01-01 DIAGNOSIS — C09.9 MALIGNANT NEOPLASM OF TONSIL (HCC): ICD-10-CM

## 2023-01-01 DIAGNOSIS — R22.1 NECK MASS: ICD-10-CM

## 2023-01-01 LAB
ALBUMIN SERPL-MCNC: 4.4 G/DL (ref 3.7–4.7)
ALBUMIN/GLOB SERPL: 1.8 {RATIO} (ref 1.2–2.2)
ALP SERPL-CCNC: 68 IU/L (ref 44–121)
ALT SERPL-CCNC: 70 IU/L (ref 0–44)
AST SERPL-CCNC: 52 IU/L (ref 0–40)
BILIRUB SERPL-MCNC: 0.7 MG/DL (ref 0–1.2)
BUN SERPL-MCNC: 18 MG/DL (ref 8–22)
BUN SERPL-MCNC: 23 MG/DL (ref 8–27)
BUN/CREAT SERPL: 16 (ref 10–24)
CALCIUM SERPL-MCNC: 9.6 MG/DL (ref 8.6–10.2)
CHLORIDE SERPL-SCNC: 106 MMOL/L (ref 96–106)
CHOLEST SERPL-MCNC: 147 MG/DL (ref 100–199)
CO2 SERPL-SCNC: 22 MMOL/L (ref 20–29)
CREAT SERPL-MCNC: 1.14 MG/DL (ref 0.5–1.4)
CREAT SERPL-MCNC: 1.4 MG/DL (ref 0.76–1.27)
EGFRCR SERPLBLD CKD-EPI 2021: 53 ML/MIN/1.73
EKG IMPRESSION: NORMAL
GFR SERPLBLD CREATININE-BSD FMLA CKD-EPI: 67 ML/MIN/1.73 M 2
GLOBULIN SER CALC-MCNC: 2.5 G/DL (ref 1.5–4.5)
GLUCOSE SERPL-MCNC: 181 MG/DL (ref 70–99)
HDLC SERPL-MCNC: 30 MG/DL
LABORATORY COMMENT REPORT: ABNORMAL
LDLC SERPL CALC-MCNC: 81 MG/DL (ref 0–99)
POTASSIUM SERPL-SCNC: 4.2 MMOL/L (ref 3.5–5.2)
PROT SERPL-MCNC: 6.9 G/DL (ref 6–8.5)
SODIUM SERPL-SCNC: 142 MMOL/L (ref 134–144)
TRIGL SERPL-MCNC: 210 MG/DL (ref 0–149)
VLDLC SERPL CALC-MCNC: 36 MG/DL (ref 5–40)

## 2023-01-01 PROCEDURE — 3074F SYST BP LT 130 MM HG: CPT

## 2023-01-01 PROCEDURE — 36415 COLL VENOUS BLD VENIPUNCTURE: CPT

## 2023-01-01 PROCEDURE — 99214 OFFICE O/P EST MOD 30 MIN: CPT

## 2023-01-01 PROCEDURE — 99213 OFFICE O/P EST LOW 20 MIN: CPT

## 2023-01-01 PROCEDURE — 84520 ASSAY OF UREA NITROGEN: CPT

## 2023-01-01 PROCEDURE — A9552 F18 FDG: HCPCS

## 2023-01-01 PROCEDURE — 82565 ASSAY OF CREATININE: CPT

## 2023-01-01 PROCEDURE — 700117 HCHG RX CONTRAST REV CODE 255: Performed by: OTOLARYNGOLOGY

## 2023-01-01 PROCEDURE — 93005 ELECTROCARDIOGRAM TRACING: CPT

## 2023-01-01 PROCEDURE — 93010 ELECTROCARDIOGRAM REPORT: CPT | Performed by: INTERNAL MEDICINE

## 2023-01-01 PROCEDURE — 3078F DIAST BP <80 MM HG: CPT

## 2023-01-01 PROCEDURE — 70491 CT SOFT TISSUE NECK W/DYE: CPT

## 2023-01-01 RX ORDER — ATORVASTATIN CALCIUM 40 MG/1
40 TABLET, FILM COATED ORAL EVERY EVENING
Qty: 90 TABLET | Refills: 0 | Status: SHIPPED | OUTPATIENT
Start: 2023-01-01 | End: 2023-01-01

## 2023-01-01 RX ORDER — ATORVASTATIN CALCIUM 80 MG/1
80 TABLET, FILM COATED ORAL EVERY EVENING
Qty: 100 TABLET | Refills: 3 | Status: SHIPPED | OUTPATIENT
Start: 2023-01-01 | End: 2024-01-01

## 2023-01-01 RX ADMIN — IOHEXOL 80 ML: 350 INJECTION, SOLUTION INTRAVENOUS at 17:00

## 2023-01-01 ASSESSMENT — ENCOUNTER SYMPTOMS
EYES NEGATIVE: 1
GASTROINTESTINAL NEGATIVE: 1
NEUROLOGICAL NEGATIVE: 1
CONSTITUTIONAL NEGATIVE: 1
DEPRESSION: 0
ORTHOPNEA: 0
NERVOUS/ANXIOUS: 0
PALPITATIONS: 0
SHORTNESS OF BREATH: 0
MUSCULOSKELETAL NEGATIVE: 1
PND: 0

## 2023-01-01 ASSESSMENT — FIBROSIS 4 INDEX: FIB4 SCORE: 2.66

## 2023-10-03 NOTE — TELEPHONE ENCOUNTER
Is the patient due for a refill? Yes    Was the patient seen the past year? Yes    Date of last office visit: 11.30.22    Does the patient have an upcoming appointment?  Yes   If yes, When? 11.29.23    Provider to refill: MARY    Does the patients insurance require a 100 day supply?  No

## 2023-12-04 NOTE — TELEPHONE ENCOUNTER
Is the patient due for a refill? Yes    Was the patient seen the past year? No    Date of last office visit: 11/30/22    Does the patient have an upcoming appointment?  Yes   If yes, When? 1/10/2024    Provider to refill:MARY    Does the patients insurance require a 100 day supply?  No

## 2023-12-07 NOTE — PATIENT INSTRUCTIONS
Increase dose of atorvastatin to 80 mg daily. Okay to take 2 of the 40 mg tablets until refill.    Follow up with Pat FERGUSON in 1 year

## 2023-12-07 NOTE — PROGRESS NOTES
Chief Complaint   Patient presents with    Coronary Artery Bypass Graft (CABG)    Dyslipidemia       Subjective     Pradip Baez is a 73 y.o. male who presents today for follow-up. They have a history of CAD status post CABG x 2 in 2015, hypertension, hyperlipidemia, diabetes type 2.  Recently found to have a tonsillar neoplasm, squamous cell carcinoma (he does not have to have surgery or radiation) He has been going to Dr. German     Last seen by PHYLLIS Agosto on 11/30/2022, at that visit he was doing well no cardiac complaints, working out for about 30 minutes every other day    Today 12/7/2023 he has been doing well from cardiac perspective.  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 German for homeopathic treatment    Activity: He has been lifting weights and running on the treadmill      Past Medical History:   Diagnosis Date    CAD (coronary artery disease)     Diabetes     oral meds, Dr Cornelius    Diabetes ([facility])     F/u of acute inferolateral myocardial infarction ([facility]) 2/7/2012    Hyperlipidemia     Hypertension     Myocardial infarct ([facility]) 1/19/2012     Past Surgical History:   Procedure Laterality Date    MULTIPLE CORONARY ARTERY BYPASS ENDO VEIN HARVEST  10/6/2015    Procedure: MULTIPLE CORONARY ARTERY BYPASS ENDO VEIN HARVEST X2;  Surgeon: Malcolm Lynn M.D.;  Location: SURGERY [facility];  Service:     RECOVERY  10/2/2015    Procedure: CATH LAB [facility] WITH POSSIBLE [place]ECK;  Surgeon: Jesus Surgery;  [place] [facility]  Service:     OTHER CARDIAC SURGERY  1/19/2012    stent    STENT PLACEMENT       Family History   Problem Relation Age of Onset    Diabetes Mother     Cancer Mother     Heart Disease Father         pacemaker    Diabetes Father     Heart Disease Sister         hole in heart     Social History     Socioeconomic History    Marital status:      Spouse name: Not on file    Number of  children: Not on file    Years of education: Not on file    Highest education level: Not on file   Occupational History    Not on file   Tobacco Use    Smoking status: Never    Smokeless tobacco: Never   Vaping Use    Vaping Use: Never used   Substance and Sexual Activity    Alcohol use: Yes     Comment: occ    Drug use: No    Sexual activity: Not on file   Other Topics Concern    Not on file   Social History Narrative    Not on file     Social Determinants of Health     Financial Resource Strain: Not on file   Food Insecurity: Not on file   Transportation Needs: Not on file   Physical Activity: Not on file   Stress: Not on file   Social Connections: Not on file   Intimate Partner Violence: Not on file   Housing Stability: Not on file     No Known Allergies  Outpatient Encounter Medications as of 12/7/2023   Medication Sig Dispense Refill    atorvastatin (LIPITOR) 40 MG Tab TAKE 1 TABLET BY MOUTH EVERY DAY IN THE EVENING 90 Tablet 0    metoprolol tartrate (LOPRESSOR) 25 MG Tab TAKE 1 TABLET BY MOUTH TWICE A  Tablet 0    OZEMPIC, 0.25 OR 0.5 MG/DOSE, 2 MG/1.5ML Solution Pen-injector INJECT 0.5 MG SUBCUTANEOUSLY EVERY WEEK      glimepiride (AMARYL) 4 MG Tab Take 4 mg by mouth 2 times a day.      HUMALOG KWIKPEN 100 UNIT/ML Solution Pen-injector injection PEN 10 Units every day.      BD PEN NEEDLE APRIL U/F       TOUJEO SOLOSTAR 300 UNIT/ML Solution Pen-injector 45 Units every day.      FREESTYLE LITE strip TEST DAILY DIAGNOSIS E11.65 TYPE 2 DIABETES MELLITUS, NOT ON INSULIN.  3    tamsulosin (FLOMAX) 0.4 MG capsule Take 0.4 mg by mouth ONE-HALF HOUR AFTER BREAKFAST.      aspirin EC 81 MG EC tablet Take 1 Tab by mouth every day. 30 Tab     Cyanocobalamin (VITAMIN B-12 PO) Take 1 Tab by mouth every day. 3000 IU SL       No facility-administered encounter medications on file as of 12/7/2023.     Review of Systems   Constitutional: Negative.    HENT: Negative.     Eyes: Negative.    Respiratory:  Negative for  "shortness of breath.    Cardiovascular:  Negative for chest pain, palpitations, orthopnea, leg swelling and PND.   Gastrointestinal: Negative.    Genitourinary: Negative.    Musculoskeletal: Negative.    Skin: Negative.    Neurological: Negative.    Endo/Heme/Allergies: Negative.    Psychiatric/Behavioral:  Negative for depression. The patient is not nervous/anxious.               Objective     /70 (BP Location: Left arm, Patient Position: Sitting, BP Cuff Size: Adult)   Pulse 75   Resp 16   Ht 1.727 m (5' 8\")   Wt 78.9 kg (174 lb)   SpO2 100%   BMI 26.46 kg/m²     Physical Exam  Constitutional:       Appearance: Normal appearance.   HENT:      Head: Normocephalic.   Neck:      Vascular: No JVD.   Cardiovascular:      Rate and Rhythm: Normal rate and regular rhythm.      Pulses: Normal pulses.      Heart sounds: Normal heart sounds. No murmur heard.     No friction rub.   Pulmonary:      Effort: Pulmonary effort is normal.      Breath sounds: Normal breath sounds.   Abdominal:      Palpations: Abdomen is soft.   Musculoskeletal:         General: Normal range of motion.      Right lower leg: No edema.      Left lower leg: No edema.   Skin:     General: Skin is warm and dry.   Neurological:      General: No focal deficit present.      Mental Status: He is alert and oriented to person, place, and time.   Psychiatric:         Mood and Affect: Mood normal.         Behavior: Behavior normal.            Lab Results   Component Value Date/Time    CHOLSTRLTOT 147 01/31/2023 04:27 AM    LDL Comment 07/16/2018 08:37 AM    HDL 30 (L) 01/31/2023 04:27 AM    TRIGLYCERIDE 210 (H) 01/31/2023 04:27 AM       Lab Results   Component Value Date/Time    SODIUM 149 (H) 06/27/2023 04:07 AM    SODIUM 142 01/31/2023 04:27 AM    SODIUM 133 (L) 10/11/2015 01:40 AM    POTASSIUM 3.9 06/27/2023 04:07 AM    POTASSIUM 4.2 01/31/2023 04:27 AM    POTASSIUM 3.8 10/11/2015 01:40 AM    CHLORIDE 100 06/27/2023 04:07 AM    CHLORIDE 106 " 01/31/2023 04:27 AM    CHLORIDE 98 10/11/2015 01:40 AM    CO2 20 06/27/2023 04:07 AM    CO2 22 01/31/2023 04:27 AM    CO2 28 10/11/2015 01:40 AM    GLUCOSE 100 (H) 06/27/2023 04:07 AM    GLUCOSE 181 (H) 01/31/2023 04:27 AM    GLUCOSE 186 (H) 10/11/2015 01:40 AM    BUN 18 11/10/2023 12:30 PM    CREATININE 1.14 11/10/2023 12:30 PM    BUNCREATRAT 14 06/27/2023 04:07 AM    BUNCREATRAT 16 01/31/2023 04:27 AM     Lab Results   Component Value Date/Time    ALKPHOSPHAT 62 06/27/2023 04:07 AM    ALKPHOSPHAT 68 01/31/2023 04:27 AM    ALKPHOSPHAT 40 10/05/2015 03:01 PM    ASTSGOT 61 (H) 06/27/2023 04:07 AM    ASTSGOT 52 (H) 01/31/2023 04:27 AM    ASTSGOT 15 10/05/2015 03:01 PM    ALTSGPT 90 (H) 06/27/2023 04:07 AM    ALTSGPT 70 (H) 01/31/2023 04:27 AM    ALTSGPT 34 10/05/2015 03:01 PM    TBILIRUBIN 0.6 06/27/2023 04:07 AM    TBILIRUBIN 0.7 01/31/2023 04:27 AM    TBILIRUBIN 0.5 10/05/2015 03:01 PM      EKG 12/7/2023  SR with RVH  Rate 77, 0.176/0.096/0.446    Assessment & Plan     1. S/P CABG x 2  EKG      2. Dyslipidemia        3. Essential hypertension        4. Multiple vessel coronary artery disease            Medical Decision Making: Today's Assessment/Status/Plan:        Coronary Artery Disease  Status post CABG x 2  - continue aspirin, atorvastatin 40 mg daily, metoprolol 25 mg twice daily  We addressed the management of atherosclerotic artery disease.  He is on proper antiplatelet, cholesterol management and beta-blockers as appropriate.  We addressed the potential side effects and laboratory follow-up for these medications.    Hypertension  - good control  - continue metoprolol 25 mg twice daily  - goal < 130/80     Hyperlipidemia  -Most recent LDL 81 (January 2023)  -Increase dose of atorvastatin to 80 mg daily  -Goal of less than 70  -Check lipid panel with annual labs    Follow-up with Pat FERGUSON in 1 year    This note was dictated using Dragon speech recognition software.

## 2024-01-01 ENCOUNTER — APPOINTMENT (OUTPATIENT)
Dept: RADIOLOGY | Facility: MEDICAL CENTER | Age: 74
DRG: 070 | End: 2024-01-01
Attending: INTERNAL MEDICINE
Payer: MEDICARE

## 2024-01-01 ENCOUNTER — APPOINTMENT (OUTPATIENT)
Dept: RADIOLOGY | Facility: MEDICAL CENTER | Age: 74
DRG: 070 | End: 2024-01-01
Attending: NURSE PRACTITIONER
Payer: MEDICARE

## 2024-01-01 ENCOUNTER — APPOINTMENT (OUTPATIENT)
Dept: CARDIOLOGY | Facility: MEDICAL CENTER | Age: 74
End: 2024-01-01
Attending: NURSE PRACTITIONER
Payer: MEDICARE

## 2024-01-01 ENCOUNTER — APPOINTMENT (OUTPATIENT)
Dept: RADIOLOGY | Facility: MEDICAL CENTER | Age: 74
End: 2024-01-01
Payer: MEDICARE

## 2024-01-01 ENCOUNTER — HOSPITAL ENCOUNTER (INPATIENT)
Facility: MEDICAL CENTER | Age: 74
LOS: 3 days | DRG: 391 | End: 2024-01-05
Attending: EMERGENCY MEDICINE | Admitting: HOSPITALIST
Payer: MEDICARE

## 2024-01-01 ENCOUNTER — APPOINTMENT (OUTPATIENT)
Dept: RADIOLOGY | Facility: MEDICAL CENTER | Age: 74
DRG: 391 | End: 2024-01-01
Attending: EMERGENCY MEDICINE
Payer: MEDICARE

## 2024-01-01 ENCOUNTER — HOSPITAL ENCOUNTER (INPATIENT)
Facility: MEDICAL CENTER | Age: 74
LOS: 11 days | DRG: 070 | End: 2024-01-25
Attending: INTERNAL MEDICINE | Admitting: INTERNAL MEDICINE
Payer: MEDICARE

## 2024-01-01 VITALS
HEIGHT: 68 IN | WEIGHT: 176.81 LBS | TEMPERATURE: 98.1 F | HEART RATE: 85 BPM | RESPIRATION RATE: 18 BRPM | DIASTOLIC BLOOD PRESSURE: 72 MMHG | OXYGEN SATURATION: 96 % | SYSTOLIC BLOOD PRESSURE: 134 MMHG | BODY MASS INDEX: 26.8 KG/M2

## 2024-01-01 VITALS
TEMPERATURE: 99.8 F | SYSTOLIC BLOOD PRESSURE: 64 MMHG | BODY MASS INDEX: 26.26 KG/M2 | WEIGHT: 167.33 LBS | HEIGHT: 67 IN | DIASTOLIC BLOOD PRESSURE: 47 MMHG

## 2024-01-01 DIAGNOSIS — K29.80 H. PYLORI DUODENITIS: ICD-10-CM

## 2024-01-01 DIAGNOSIS — B96.81 ACUTE GASTRIC ULCER DUE TO HELICOBACTER PYLORI: ICD-10-CM

## 2024-01-01 DIAGNOSIS — B96.81 H. PYLORI DUODENITIS: ICD-10-CM

## 2024-01-01 DIAGNOSIS — R11.2 NAUSEA AND VOMITING, UNSPECIFIED VOMITING TYPE: ICD-10-CM

## 2024-01-01 DIAGNOSIS — K59.03 DRUG-INDUCED CONSTIPATION: ICD-10-CM

## 2024-01-01 DIAGNOSIS — K29.80 DUODENITIS: ICD-10-CM

## 2024-01-01 DIAGNOSIS — K25.3 ACUTE GASTRIC ULCER DUE TO HELICOBACTER PYLORI: ICD-10-CM

## 2024-01-01 LAB
ALBUMIN SERPL BCP-MCNC: 2.6 G/DL (ref 3.2–4.9)
ALBUMIN SERPL BCP-MCNC: 2.6 G/DL (ref 3.2–4.9)
ALBUMIN SERPL BCP-MCNC: 2.9 G/DL (ref 3.2–4.9)
ALBUMIN SERPL BCP-MCNC: 3 G/DL (ref 3.2–4.9)
ALBUMIN SERPL BCP-MCNC: 3.8 G/DL (ref 3.2–4.9)
ALBUMIN/GLOB SERPL: 0.7 G/DL
ALBUMIN/GLOB SERPL: 0.8 G/DL
ALBUMIN/GLOB SERPL: 0.9 G/DL
ALBUMIN/GLOB SERPL: 1.1 G/DL
ALBUMIN/GLOB SERPL: 1.1 G/DL
ALP SERPL-CCNC: 39 U/L (ref 30–99)
ALP SERPL-CCNC: 48 U/L (ref 30–99)
ALP SERPL-CCNC: 51 U/L (ref 30–99)
ALP SERPL-CCNC: 53 U/L (ref 30–99)
ALP SERPL-CCNC: 85 U/L (ref 30–99)
ALT SERPL-CCNC: 14 U/L (ref 2–50)
ALT SERPL-CCNC: 18 U/L (ref 2–50)
ALT SERPL-CCNC: 19 U/L (ref 2–50)
ALT SERPL-CCNC: 22 U/L (ref 2–50)
ALT SERPL-CCNC: 42 U/L (ref 2–50)
ANION GAP SERPL CALC-SCNC: 10 MMOL/L (ref 7–16)
ANION GAP SERPL CALC-SCNC: 12 MMOL/L (ref 7–16)
ANION GAP SERPL CALC-SCNC: 12 MMOL/L (ref 7–16)
ANION GAP SERPL CALC-SCNC: 15 MMOL/L (ref 7–16)
ANION GAP SERPL CALC-SCNC: 16 MMOL/L (ref 7–16)
ANION GAP SERPL CALC-SCNC: 7 MMOL/L (ref 7–16)
ANION GAP SERPL CALC-SCNC: 9 MMOL/L (ref 7–16)
ANION GAP SERPL CALC-SCNC: 9 MMOL/L (ref 7–16)
APPEARANCE UR: CLEAR
ARTERIAL PATENCY WRIST A: ABNORMAL
AST SERPL-CCNC: 11 U/L (ref 12–45)
AST SERPL-CCNC: 13 U/L (ref 12–45)
AST SERPL-CCNC: 17 U/L (ref 12–45)
AST SERPL-CCNC: 53 U/L (ref 12–45)
AST SERPL-CCNC: 9 U/L (ref 12–45)
BACTERIA #/AREA URNS HPF: NEGATIVE /HPF
BASE EXCESS BLDA CALC-SCNC: 1 MMOL/L (ref -4–3)
BASE EXCESS BLDA CALC-SCNC: 2 MMOL/L (ref -4–3)
BASE EXCESS BLDA CALC-SCNC: 3 MMOL/L (ref -4–3)
BASE EXCESS BLDV CALC-SCNC: -4 MMOL/L
BASE EXCESS BLDV CALC-SCNC: 0 MMOL/L
BASOPHILS # BLD AUTO: 0 % (ref 0–1.8)
BASOPHILS # BLD AUTO: 0.3 % (ref 0–1.8)
BASOPHILS # BLD AUTO: 0.5 % (ref 0–1.8)
BASOPHILS # BLD AUTO: 0.5 % (ref 0–1.8)
BASOPHILS # BLD AUTO: 0.6 % (ref 0–1.8)
BASOPHILS # BLD AUTO: 0.7 % (ref 0–1.8)
BASOPHILS # BLD AUTO: 0.8 % (ref 0–1.8)
BASOPHILS # BLD AUTO: 1 % (ref 0–1.8)
BASOPHILS # BLD: 0 K/UL (ref 0–0.12)
BASOPHILS # BLD: 0.01 K/UL (ref 0–0.12)
BASOPHILS # BLD: 0.02 K/UL (ref 0–0.12)
BASOPHILS # BLD: 0.03 K/UL (ref 0–0.12)
BASOPHILS # BLD: 0.04 K/UL (ref 0–0.12)
BASOPHILS # BLD: 0.07 K/UL (ref 0–0.12)
BASOPHILS # BLD: 0.09 K/UL (ref 0–0.12)
BASOPHILS # BLD: 0.1 K/UL (ref 0–0.12)
BASOPHILS # BLD: 0.1 K/UL (ref 0–0.12)
BILIRUB SERPL-MCNC: 0.2 MG/DL (ref 0.1–1.5)
BILIRUB SERPL-MCNC: 0.2 MG/DL (ref 0.1–1.5)
BILIRUB SERPL-MCNC: 0.3 MG/DL (ref 0.1–1.5)
BILIRUB SERPL-MCNC: 0.8 MG/DL (ref 0.1–1.5)
BILIRUB SERPL-MCNC: 1.1 MG/DL (ref 0.1–1.5)
BILIRUB UR QL STRIP.AUTO: NEGATIVE
BODY TEMPERATURE: 36.5 CENTIGRADE
BODY TEMPERATURE: 37.7 CENTIGRADE
BODY TEMPERATURE: ABNORMAL DEGREES
BREATHS SETTING VENT: 14
BREATHS SETTING VENT: 18
BREATHS SETTING VENT: 20
BUN SERPL-MCNC: 12 MG/DL (ref 8–22)
BUN SERPL-MCNC: 12 MG/DL (ref 8–22)
BUN SERPL-MCNC: 13 MG/DL (ref 8–22)
BUN SERPL-MCNC: 15 MG/DL (ref 8–22)
BUN SERPL-MCNC: 17 MG/DL (ref 8–22)
BUN SERPL-MCNC: 17 MG/DL (ref 8–22)
BUN SERPL-MCNC: 20 MG/DL (ref 8–22)
BUN SERPL-MCNC: 25 MG/DL (ref 8–22)
BUN SERPL-MCNC: 25 MG/DL (ref 8–22)
BUN SERPL-MCNC: 26 MG/DL (ref 8–22)
BUN SERPL-MCNC: 30 MG/DL (ref 8–22)
BUN SERPL-MCNC: 38 MG/DL (ref 8–22)
CALCIUM ALBUM COR SERPL-MCNC: 10 MG/DL (ref 8.5–10.5)
CALCIUM ALBUM COR SERPL-MCNC: 9.2 MG/DL (ref 8.5–10.5)
CALCIUM ALBUM COR SERPL-MCNC: 9.3 MG/DL (ref 8.5–10.5)
CALCIUM ALBUM COR SERPL-MCNC: 9.5 MG/DL (ref 8.5–10.5)
CALCIUM ALBUM COR SERPL-MCNC: 9.8 MG/DL (ref 8.5–10.5)
CALCIUM SERPL-MCNC: 7.7 MG/DL (ref 8.4–10.2)
CALCIUM SERPL-MCNC: 7.8 MG/DL (ref 8.4–10.2)
CALCIUM SERPL-MCNC: 8.3 MG/DL (ref 8.5–10.5)
CALCIUM SERPL-MCNC: 8.4 MG/DL (ref 8.5–10.5)
CALCIUM SERPL-MCNC: 8.5 MG/DL (ref 8.4–10.2)
CALCIUM SERPL-MCNC: 8.5 MG/DL (ref 8.5–10.5)
CALCIUM SERPL-MCNC: 8.8 MG/DL (ref 8.5–10.5)
CALCIUM SERPL-MCNC: 8.9 MG/DL (ref 8.5–10.5)
CALCIUM SERPL-MCNC: 9.6 MG/DL (ref 8.4–10.2)
CHLORIDE SERPL-SCNC: 100 MMOL/L (ref 96–112)
CHLORIDE SERPL-SCNC: 101 MMOL/L (ref 96–112)
CHLORIDE SERPL-SCNC: 103 MMOL/L (ref 96–112)
CHLORIDE SERPL-SCNC: 105 MMOL/L (ref 96–112)
CHLORIDE SERPL-SCNC: 106 MMOL/L (ref 96–112)
CHLORIDE SERPL-SCNC: 109 MMOL/L (ref 96–112)
CHLORIDE SERPL-SCNC: 98 MMOL/L (ref 96–112)
CHLORIDE SERPL-SCNC: 99 MMOL/L (ref 96–112)
CO2 BLDA-SCNC: 25 MMOL/L (ref 20–33)
CO2 BLDA-SCNC: 26 MMOL/L (ref 20–33)
CO2 BLDA-SCNC: 28 MMOL/L (ref 20–33)
CO2 SERPL-SCNC: 18 MMOL/L (ref 20–33)
CO2 SERPL-SCNC: 21 MMOL/L (ref 20–33)
CO2 SERPL-SCNC: 22 MMOL/L (ref 20–33)
CO2 SERPL-SCNC: 24 MMOL/L (ref 20–33)
CO2 SERPL-SCNC: 24 MMOL/L (ref 20–33)
CO2 SERPL-SCNC: 25 MMOL/L (ref 20–33)
CO2 SERPL-SCNC: 27 MMOL/L (ref 20–33)
CO2 SERPL-SCNC: 27 MMOL/L (ref 20–33)
CO2 SERPL-SCNC: 28 MMOL/L (ref 20–33)
CO2 SERPL-SCNC: 29 MMOL/L (ref 20–33)
COLOR UR: YELLOW
CREAT SERPL-MCNC: 0.98 MG/DL (ref 0.5–1.4)
CREAT SERPL-MCNC: 0.99 MG/DL (ref 0.5–1.4)
CREAT SERPL-MCNC: 1 MG/DL (ref 0.5–1.4)
CREAT SERPL-MCNC: 1.01 MG/DL (ref 0.5–1.4)
CREAT SERPL-MCNC: 1.01 MG/DL (ref 0.5–1.4)
CREAT SERPL-MCNC: 1.03 MG/DL (ref 0.5–1.4)
CREAT SERPL-MCNC: 1.05 MG/DL (ref 0.5–1.4)
CREAT SERPL-MCNC: 1.05 MG/DL (ref 0.5–1.4)
CREAT SERPL-MCNC: 1.1 MG/DL (ref 0.5–1.4)
CREAT SERPL-MCNC: 1.13 MG/DL (ref 0.5–1.4)
CREAT SERPL-MCNC: 1.16 MG/DL (ref 0.5–1.4)
CREAT SERPL-MCNC: 1.2 MG/DL (ref 0.5–1.4)
CRP SERPL HS-MCNC: 10.71 MG/DL (ref 0–0.75)
CRP SERPL HS-MCNC: 12.46 MG/DL (ref 0–0.75)
CRP SERPL HS-MCNC: 13.3 MG/DL (ref 0–0.75)
DELSYS IDSYS: ABNORMAL
EKG IMPRESSION: NORMAL
END TIDAL CARBON DIOXIDE IECO2: 31 MMHG
END TIDAL CARBON DIOXIDE IECO2: 32 MMHG
END TIDAL CARBON DIOXIDE IECO2: 38 MMHG
EOSINOPHIL # BLD AUTO: 0 K/UL (ref 0–0.51)
EOSINOPHIL # BLD AUTO: 0.03 K/UL (ref 0–0.51)
EOSINOPHIL # BLD AUTO: 0.07 K/UL (ref 0–0.51)
EOSINOPHIL # BLD AUTO: 0.07 K/UL (ref 0–0.51)
EOSINOPHIL # BLD AUTO: 0.09 K/UL (ref 0–0.51)
EOSINOPHIL # BLD AUTO: 0.1 K/UL (ref 0–0.51)
EOSINOPHIL # BLD AUTO: 0.11 K/UL (ref 0–0.51)
EOSINOPHIL # BLD AUTO: 0.11 K/UL (ref 0–0.51)
EOSINOPHIL # BLD AUTO: 0.13 K/UL (ref 0–0.51)
EOSINOPHIL # BLD AUTO: 0.13 K/UL (ref 0–0.51)
EOSINOPHIL # BLD AUTO: 0.15 K/UL (ref 0–0.51)
EOSINOPHIL NFR BLD: 0 % (ref 0–6.9)
EOSINOPHIL NFR BLD: 0.4 % (ref 0–6.9)
EOSINOPHIL NFR BLD: 0.8 % (ref 0–6.9)
EOSINOPHIL NFR BLD: 0.9 % (ref 0–6.9)
EOSINOPHIL NFR BLD: 1 % (ref 0–6.9)
EOSINOPHIL NFR BLD: 1 % (ref 0–6.9)
EOSINOPHIL NFR BLD: 1.7 % (ref 0–6.9)
EOSINOPHIL NFR BLD: 1.9 % (ref 0–6.9)
EOSINOPHIL NFR BLD: 2.1 % (ref 0–6.9)
EOSINOPHIL NFR BLD: 4 % (ref 0–6.9)
EOSINOPHIL NFR BLD: 6.5 % (ref 0–6.9)
EPI CELLS #/AREA URNS HPF: NEGATIVE /HPF
ERYTHROCYTE [DISTWIDTH] IN BLOOD BY AUTOMATED COUNT: 37.8 FL (ref 35.9–50)
ERYTHROCYTE [DISTWIDTH] IN BLOOD BY AUTOMATED COUNT: 39.1 FL (ref 35.9–50)
ERYTHROCYTE [DISTWIDTH] IN BLOOD BY AUTOMATED COUNT: 39.1 FL (ref 35.9–50)
ERYTHROCYTE [DISTWIDTH] IN BLOOD BY AUTOMATED COUNT: 39.6 FL (ref 35.9–50)
ERYTHROCYTE [DISTWIDTH] IN BLOOD BY AUTOMATED COUNT: 41.5 FL (ref 35.9–50)
ERYTHROCYTE [DISTWIDTH] IN BLOOD BY AUTOMATED COUNT: 42.2 FL (ref 35.9–50)
ERYTHROCYTE [DISTWIDTH] IN BLOOD BY AUTOMATED COUNT: 42.2 FL (ref 35.9–50)
ERYTHROCYTE [DISTWIDTH] IN BLOOD BY AUTOMATED COUNT: 42.5 FL (ref 35.9–50)
ERYTHROCYTE [DISTWIDTH] IN BLOOD BY AUTOMATED COUNT: 42.7 FL (ref 35.9–50)
ERYTHROCYTE [DISTWIDTH] IN BLOOD BY AUTOMATED COUNT: 42.7 FL (ref 35.9–50)
ERYTHROCYTE [DISTWIDTH] IN BLOOD BY AUTOMATED COUNT: 43.3 FL (ref 35.9–50)
ERYTHROCYTE [DISTWIDTH] IN BLOOD BY AUTOMATED COUNT: 43.5 FL (ref 35.9–50)
ERYTHROCYTE [DISTWIDTH] IN BLOOD BY AUTOMATED COUNT: 44.1 FL (ref 35.9–50)
FLUAV RNA SPEC QL NAA+PROBE: NEGATIVE
FLUBV RNA SPEC QL NAA+PROBE: NEGATIVE
GASTROCULT GAST QL: POSITIVE
GFR SERPLBLD CREATININE-BSD FMLA CKD-EPI: 63 ML/MIN/1.73 M 2
GFR SERPLBLD CREATININE-BSD FMLA CKD-EPI: 66 ML/MIN/1.73 M 2
GFR SERPLBLD CREATININE-BSD FMLA CKD-EPI: 68 ML/MIN/1.73 M 2
GFR SERPLBLD CREATININE-BSD FMLA CKD-EPI: 70 ML/MIN/1.73 M 2
GFR SERPLBLD CREATININE-BSD FMLA CKD-EPI: 74 ML/MIN/1.73 M 2
GFR SERPLBLD CREATININE-BSD FMLA CKD-EPI: 74 ML/MIN/1.73 M 2
GFR SERPLBLD CREATININE-BSD FMLA CKD-EPI: 76 ML/MIN/1.73 M 2
GFR SERPLBLD CREATININE-BSD FMLA CKD-EPI: 77 ML/MIN/1.73 M 2
GFR SERPLBLD CREATININE-BSD FMLA CKD-EPI: 77 ML/MIN/1.73 M 2
GFR SERPLBLD CREATININE-BSD FMLA CKD-EPI: 78 ML/MIN/1.73 M 2
GFR SERPLBLD CREATININE-BSD FMLA CKD-EPI: 79 ML/MIN/1.73 M 2
GFR SERPLBLD CREATININE-BSD FMLA CKD-EPI: 80 ML/MIN/1.73 M 2
GLOBULIN SER CALC-MCNC: 2.8 G/DL (ref 1.9–3.5)
GLOBULIN SER CALC-MCNC: 3.3 G/DL (ref 1.9–3.5)
GLOBULIN SER CALC-MCNC: 3.4 G/DL (ref 1.9–3.5)
GLOBULIN SER CALC-MCNC: 3.4 G/DL (ref 1.9–3.5)
GLOBULIN SER CALC-MCNC: 4 G/DL (ref 1.9–3.5)
GLUCOSE BLD STRIP.AUTO-MCNC: 108 MG/DL (ref 65–99)
GLUCOSE BLD STRIP.AUTO-MCNC: 120 MG/DL (ref 65–99)
GLUCOSE BLD STRIP.AUTO-MCNC: 127 MG/DL (ref 65–99)
GLUCOSE BLD STRIP.AUTO-MCNC: 128 MG/DL (ref 65–99)
GLUCOSE BLD STRIP.AUTO-MCNC: 129 MG/DL (ref 65–99)
GLUCOSE BLD STRIP.AUTO-MCNC: 130 MG/DL (ref 65–99)
GLUCOSE BLD STRIP.AUTO-MCNC: 130 MG/DL (ref 65–99)
GLUCOSE BLD STRIP.AUTO-MCNC: 132 MG/DL (ref 65–99)
GLUCOSE BLD STRIP.AUTO-MCNC: 132 MG/DL (ref 65–99)
GLUCOSE BLD STRIP.AUTO-MCNC: 133 MG/DL (ref 65–99)
GLUCOSE BLD STRIP.AUTO-MCNC: 134 MG/DL (ref 65–99)
GLUCOSE BLD STRIP.AUTO-MCNC: 134 MG/DL (ref 65–99)
GLUCOSE BLD STRIP.AUTO-MCNC: 136 MG/DL (ref 65–99)
GLUCOSE BLD STRIP.AUTO-MCNC: 137 MG/DL (ref 65–99)
GLUCOSE BLD STRIP.AUTO-MCNC: 138 MG/DL (ref 65–99)
GLUCOSE BLD STRIP.AUTO-MCNC: 140 MG/DL (ref 65–99)
GLUCOSE BLD STRIP.AUTO-MCNC: 141 MG/DL (ref 65–99)
GLUCOSE BLD STRIP.AUTO-MCNC: 142 MG/DL (ref 65–99)
GLUCOSE BLD STRIP.AUTO-MCNC: 143 MG/DL (ref 65–99)
GLUCOSE BLD STRIP.AUTO-MCNC: 145 MG/DL (ref 65–99)
GLUCOSE BLD STRIP.AUTO-MCNC: 145 MG/DL (ref 65–99)
GLUCOSE BLD STRIP.AUTO-MCNC: 146 MG/DL (ref 65–99)
GLUCOSE BLD STRIP.AUTO-MCNC: 147 MG/DL (ref 65–99)
GLUCOSE BLD STRIP.AUTO-MCNC: 147 MG/DL (ref 65–99)
GLUCOSE BLD STRIP.AUTO-MCNC: 148 MG/DL (ref 65–99)
GLUCOSE BLD STRIP.AUTO-MCNC: 149 MG/DL (ref 65–99)
GLUCOSE BLD STRIP.AUTO-MCNC: 149 MG/DL (ref 65–99)
GLUCOSE BLD STRIP.AUTO-MCNC: 150 MG/DL (ref 65–99)
GLUCOSE BLD STRIP.AUTO-MCNC: 151 MG/DL (ref 65–99)
GLUCOSE BLD STRIP.AUTO-MCNC: 152 MG/DL (ref 65–99)
GLUCOSE BLD STRIP.AUTO-MCNC: 152 MG/DL (ref 65–99)
GLUCOSE BLD STRIP.AUTO-MCNC: 153 MG/DL (ref 65–99)
GLUCOSE BLD STRIP.AUTO-MCNC: 155 MG/DL (ref 65–99)
GLUCOSE BLD STRIP.AUTO-MCNC: 156 MG/DL (ref 65–99)
GLUCOSE BLD STRIP.AUTO-MCNC: 156 MG/DL (ref 65–99)
GLUCOSE BLD STRIP.AUTO-MCNC: 157 MG/DL (ref 65–99)
GLUCOSE BLD STRIP.AUTO-MCNC: 158 MG/DL (ref 65–99)
GLUCOSE BLD STRIP.AUTO-MCNC: 158 MG/DL (ref 65–99)
GLUCOSE BLD STRIP.AUTO-MCNC: 159 MG/DL (ref 65–99)
GLUCOSE BLD STRIP.AUTO-MCNC: 160 MG/DL (ref 65–99)
GLUCOSE BLD STRIP.AUTO-MCNC: 160 MG/DL (ref 65–99)
GLUCOSE BLD STRIP.AUTO-MCNC: 161 MG/DL (ref 65–99)
GLUCOSE BLD STRIP.AUTO-MCNC: 162 MG/DL (ref 65–99)
GLUCOSE BLD STRIP.AUTO-MCNC: 162 MG/DL (ref 65–99)
GLUCOSE BLD STRIP.AUTO-MCNC: 163 MG/DL (ref 65–99)
GLUCOSE BLD STRIP.AUTO-MCNC: 164 MG/DL (ref 65–99)
GLUCOSE BLD STRIP.AUTO-MCNC: 165 MG/DL (ref 65–99)
GLUCOSE BLD STRIP.AUTO-MCNC: 165 MG/DL (ref 65–99)
GLUCOSE BLD STRIP.AUTO-MCNC: 166 MG/DL (ref 65–99)
GLUCOSE BLD STRIP.AUTO-MCNC: 167 MG/DL (ref 65–99)
GLUCOSE BLD STRIP.AUTO-MCNC: 168 MG/DL (ref 65–99)
GLUCOSE BLD STRIP.AUTO-MCNC: 168 MG/DL (ref 65–99)
GLUCOSE BLD STRIP.AUTO-MCNC: 169 MG/DL (ref 65–99)
GLUCOSE BLD STRIP.AUTO-MCNC: 169 MG/DL (ref 65–99)
GLUCOSE BLD STRIP.AUTO-MCNC: 170 MG/DL (ref 65–99)
GLUCOSE BLD STRIP.AUTO-MCNC: 170 MG/DL (ref 65–99)
GLUCOSE BLD STRIP.AUTO-MCNC: 171 MG/DL (ref 65–99)
GLUCOSE BLD STRIP.AUTO-MCNC: 172 MG/DL (ref 65–99)
GLUCOSE BLD STRIP.AUTO-MCNC: 173 MG/DL (ref 65–99)
GLUCOSE BLD STRIP.AUTO-MCNC: 174 MG/DL (ref 65–99)
GLUCOSE BLD STRIP.AUTO-MCNC: 175 MG/DL (ref 65–99)
GLUCOSE BLD STRIP.AUTO-MCNC: 175 MG/DL (ref 65–99)
GLUCOSE BLD STRIP.AUTO-MCNC: 176 MG/DL (ref 65–99)
GLUCOSE BLD STRIP.AUTO-MCNC: 177 MG/DL (ref 65–99)
GLUCOSE BLD STRIP.AUTO-MCNC: 178 MG/DL (ref 65–99)
GLUCOSE BLD STRIP.AUTO-MCNC: 178 MG/DL (ref 65–99)
GLUCOSE BLD STRIP.AUTO-MCNC: 179 MG/DL (ref 65–99)
GLUCOSE BLD STRIP.AUTO-MCNC: 180 MG/DL (ref 65–99)
GLUCOSE BLD STRIP.AUTO-MCNC: 184 MG/DL (ref 65–99)
GLUCOSE BLD STRIP.AUTO-MCNC: 185 MG/DL (ref 65–99)
GLUCOSE BLD STRIP.AUTO-MCNC: 186 MG/DL (ref 65–99)
GLUCOSE BLD STRIP.AUTO-MCNC: 187 MG/DL (ref 65–99)
GLUCOSE BLD STRIP.AUTO-MCNC: 187 MG/DL (ref 65–99)
GLUCOSE BLD STRIP.AUTO-MCNC: 188 MG/DL (ref 65–99)
GLUCOSE BLD STRIP.AUTO-MCNC: 188 MG/DL (ref 65–99)
GLUCOSE BLD STRIP.AUTO-MCNC: 189 MG/DL (ref 65–99)
GLUCOSE BLD STRIP.AUTO-MCNC: 190 MG/DL (ref 65–99)
GLUCOSE BLD STRIP.AUTO-MCNC: 193 MG/DL (ref 65–99)
GLUCOSE BLD STRIP.AUTO-MCNC: 193 MG/DL (ref 65–99)
GLUCOSE BLD STRIP.AUTO-MCNC: 194 MG/DL (ref 65–99)
GLUCOSE BLD STRIP.AUTO-MCNC: 196 MG/DL (ref 65–99)
GLUCOSE BLD STRIP.AUTO-MCNC: 196 MG/DL (ref 65–99)
GLUCOSE BLD STRIP.AUTO-MCNC: 197 MG/DL (ref 65–99)
GLUCOSE BLD STRIP.AUTO-MCNC: 199 MG/DL (ref 65–99)
GLUCOSE BLD STRIP.AUTO-MCNC: 200 MG/DL (ref 65–99)
GLUCOSE BLD STRIP.AUTO-MCNC: 207 MG/DL (ref 65–99)
GLUCOSE BLD STRIP.AUTO-MCNC: 209 MG/DL (ref 65–99)
GLUCOSE BLD STRIP.AUTO-MCNC: 209 MG/DL (ref 65–99)
GLUCOSE BLD STRIP.AUTO-MCNC: 214 MG/DL (ref 65–99)
GLUCOSE BLD STRIP.AUTO-MCNC: 216 MG/DL (ref 65–99)
GLUCOSE BLD STRIP.AUTO-MCNC: 217 MG/DL (ref 65–99)
GLUCOSE BLD STRIP.AUTO-MCNC: 218 MG/DL (ref 65–99)
GLUCOSE BLD STRIP.AUTO-MCNC: 220 MG/DL (ref 65–99)
GLUCOSE BLD STRIP.AUTO-MCNC: 221 MG/DL (ref 65–99)
GLUCOSE BLD STRIP.AUTO-MCNC: 223 MG/DL (ref 65–99)
GLUCOSE BLD STRIP.AUTO-MCNC: 223 MG/DL (ref 65–99)
GLUCOSE BLD STRIP.AUTO-MCNC: 226 MG/DL (ref 65–99)
GLUCOSE BLD STRIP.AUTO-MCNC: 228 MG/DL (ref 65–99)
GLUCOSE BLD STRIP.AUTO-MCNC: 234 MG/DL (ref 65–99)
GLUCOSE BLD STRIP.AUTO-MCNC: 238 MG/DL (ref 65–99)
GLUCOSE BLD STRIP.AUTO-MCNC: 242 MG/DL (ref 65–99)
GLUCOSE BLD STRIP.AUTO-MCNC: 244 MG/DL (ref 65–99)
GLUCOSE BLD STRIP.AUTO-MCNC: 245 MG/DL (ref 65–99)
GLUCOSE BLD STRIP.AUTO-MCNC: 247 MG/DL (ref 65–99)
GLUCOSE BLD STRIP.AUTO-MCNC: 255 MG/DL (ref 65–99)
GLUCOSE BLD STRIP.AUTO-MCNC: 257 MG/DL (ref 65–99)
GLUCOSE BLD STRIP.AUTO-MCNC: 262 MG/DL (ref 65–99)
GLUCOSE BLD STRIP.AUTO-MCNC: 271 MG/DL (ref 65–99)
GLUCOSE BLD STRIP.AUTO-MCNC: 273 MG/DL (ref 65–99)
GLUCOSE BLD STRIP.AUTO-MCNC: 273 MG/DL (ref 65–99)
GLUCOSE BLD STRIP.AUTO-MCNC: 284 MG/DL (ref 65–99)
GLUCOSE BLD STRIP.AUTO-MCNC: 289 MG/DL (ref 65–99)
GLUCOSE BLD STRIP.AUTO-MCNC: 296 MG/DL (ref 65–99)
GLUCOSE BLD STRIP.AUTO-MCNC: 303 MG/DL (ref 65–99)
GLUCOSE BLD STRIP.AUTO-MCNC: 320 MG/DL (ref 65–99)
GLUCOSE BLD STRIP.AUTO-MCNC: 325 MG/DL (ref 65–99)
GLUCOSE BLD STRIP.AUTO-MCNC: 326 MG/DL (ref 65–99)
GLUCOSE BLD STRIP.AUTO-MCNC: 332 MG/DL (ref 65–99)
GLUCOSE BLD STRIP.AUTO-MCNC: 346 MG/DL (ref 65–99)
GLUCOSE BLD STRIP.AUTO-MCNC: 352 MG/DL (ref 65–99)
GLUCOSE BLD STRIP.AUTO-MCNC: 369 MG/DL (ref 65–99)
GLUCOSE BLD STRIP.AUTO-MCNC: 387 MG/DL (ref 65–99)
GLUCOSE BLD STRIP.AUTO-MCNC: 409 MG/DL (ref 65–99)
GLUCOSE BLD STRIP.AUTO-MCNC: 80 MG/DL (ref 65–99)
GLUCOSE BLD STRIP.AUTO-MCNC: 86 MG/DL (ref 65–99)
GLUCOSE BLD STRIP.AUTO-MCNC: 87 MG/DL (ref 65–99)
GLUCOSE BLD STRIP.AUTO-MCNC: 88 MG/DL (ref 65–99)
GLUCOSE BLD STRIP.AUTO-MCNC: 95 MG/DL (ref 65–99)
GLUCOSE BLD STRIP.AUTO-MCNC: 96 MG/DL (ref 65–99)
GLUCOSE BLD STRIP.AUTO-MCNC: 96 MG/DL (ref 65–99)
GLUCOSE BLD STRIP.AUTO-MCNC: 98 MG/DL (ref 65–99)
GLUCOSE SERPL-MCNC: 129 MG/DL (ref 65–99)
GLUCOSE SERPL-MCNC: 133 MG/DL (ref 65–99)
GLUCOSE SERPL-MCNC: 157 MG/DL (ref 65–99)
GLUCOSE SERPL-MCNC: 159 MG/DL (ref 65–99)
GLUCOSE SERPL-MCNC: 205 MG/DL (ref 65–99)
GLUCOSE SERPL-MCNC: 256 MG/DL (ref 65–99)
GLUCOSE SERPL-MCNC: 271 MG/DL (ref 65–99)
GLUCOSE SERPL-MCNC: 283 MG/DL (ref 65–99)
GLUCOSE SERPL-MCNC: 303 MG/DL (ref 65–99)
GLUCOSE SERPL-MCNC: 379 MG/DL (ref 65–99)
GLUCOSE SERPL-MCNC: 83 MG/DL (ref 65–99)
GLUCOSE SERPL-MCNC: 89 MG/DL (ref 65–99)
GLUCOSE UR STRIP.AUTO-MCNC: NEGATIVE MG/DL
H PYLORI AG STL QL IA: DETECTED
HCO3 BLDA-SCNC: 23.8 MMOL/L (ref 17–25)
HCO3 BLDA-SCNC: 24.7 MMOL/L (ref 17–25)
HCO3 BLDA-SCNC: 27.1 MMOL/L (ref 17–25)
HCO3 BLDV-SCNC: 21 MMOL/L (ref 24–28)
HCO3 BLDV-SCNC: 23 MMOL/L (ref 24–28)
HCT VFR BLD AUTO: 28.5 % (ref 42–52)
HCT VFR BLD AUTO: 29.5 % (ref 42–52)
HCT VFR BLD AUTO: 30.3 % (ref 42–52)
HCT VFR BLD AUTO: 31 % (ref 42–52)
HCT VFR BLD AUTO: 31.2 % (ref 42–52)
HCT VFR BLD AUTO: 31.9 % (ref 42–52)
HCT VFR BLD AUTO: 32.5 % (ref 42–52)
HCT VFR BLD AUTO: 33.3 % (ref 42–52)
HCT VFR BLD AUTO: 33.4 % (ref 42–52)
HCT VFR BLD AUTO: 33.4 % (ref 42–52)
HCT VFR BLD AUTO: 33.7 % (ref 42–52)
HCT VFR BLD AUTO: 35.1 % (ref 42–52)
HCT VFR BLD AUTO: 41.6 % (ref 42–52)
HGB BLD-MCNC: 10.2 G/DL (ref 14–18)
HGB BLD-MCNC: 10.3 G/DL (ref 14–18)
HGB BLD-MCNC: 10.4 G/DL (ref 14–18)
HGB BLD-MCNC: 10.4 G/DL (ref 14–18)
HGB BLD-MCNC: 10.6 G/DL (ref 14–18)
HGB BLD-MCNC: 11.1 G/DL (ref 14–18)
HGB BLD-MCNC: 11.2 G/DL (ref 14–18)
HGB BLD-MCNC: 11.3 G/DL (ref 14–18)
HGB BLD-MCNC: 12 G/DL (ref 14–18)
HGB BLD-MCNC: 14.3 G/DL (ref 14–18)
HGB BLD-MCNC: 9.7 G/DL (ref 14–18)
HOROWITZ INDEX BLDA+IHG-RTO: 280 MM[HG]
HOROWITZ INDEX BLDA+IHG-RTO: 304 MM[HG]
HOROWITZ INDEX BLDA+IHG-RTO: 353 MM[HG]
HYALINE CASTS #/AREA URNS LPF: ABNORMAL /LPF
IMM GRANULOCYTES # BLD AUTO: 0.02 K/UL (ref 0–0.11)
IMM GRANULOCYTES # BLD AUTO: 0.08 K/UL (ref 0–0.11)
IMM GRANULOCYTES # BLD AUTO: 0.1 K/UL (ref 0–0.11)
IMM GRANULOCYTES # BLD AUTO: 0.11 K/UL (ref 0–0.11)
IMM GRANULOCYTES # BLD AUTO: 0.16 K/UL (ref 0–0.11)
IMM GRANULOCYTES # BLD AUTO: 0.26 K/UL (ref 0–0.11)
IMM GRANULOCYTES # BLD AUTO: 0.46 K/UL (ref 0–0.11)
IMM GRANULOCYTES # BLD AUTO: 0.51 K/UL (ref 0–0.11)
IMM GRANULOCYTES # BLD AUTO: 0.6 K/UL (ref 0–0.11)
IMM GRANULOCYTES NFR BLD AUTO: 1 % (ref 0–0.9)
IMM GRANULOCYTES NFR BLD AUTO: 1.4 % (ref 0–0.9)
IMM GRANULOCYTES NFR BLD AUTO: 1.9 % (ref 0–0.9)
IMM GRANULOCYTES NFR BLD AUTO: 2.1 % (ref 0–0.9)
IMM GRANULOCYTES NFR BLD AUTO: 2.8 % (ref 0–0.9)
IMM GRANULOCYTES NFR BLD AUTO: 3 % (ref 0–0.9)
IMM GRANULOCYTES NFR BLD AUTO: 3.1 % (ref 0–0.9)
IMM GRANULOCYTES NFR BLD AUTO: 3.8 % (ref 0–0.9)
IMM GRANULOCYTES NFR BLD AUTO: 4.4 % (ref 0–0.9)
INHALED O2 FLOW RATE: ABNORMAL L/MIN
INHALED O2 FLOW RATE: ABNORMAL L/MIN
INR PPP: 1.28 (ref 0.87–1.13)
KETONES UR STRIP.AUTO-MCNC: NEGATIVE MG/DL
LACTATE SERPL-SCNC: 1.7 MMOL/L (ref 0.5–2)
LEUKOCYTE ESTERASE UR QL STRIP.AUTO: NEGATIVE
LIPASE SERPL-CCNC: 47 U/L (ref 11–82)
LYMPHOCYTES # BLD AUTO: 0.74 K/UL (ref 1–4.8)
LYMPHOCYTES # BLD AUTO: 0.78 K/UL (ref 1–4.8)
LYMPHOCYTES # BLD AUTO: 0.81 K/UL (ref 1–4.8)
LYMPHOCYTES # BLD AUTO: 0.99 K/UL (ref 1–4.8)
LYMPHOCYTES # BLD AUTO: 1.04 K/UL (ref 1–4.8)
LYMPHOCYTES # BLD AUTO: 1.05 K/UL (ref 1–4.8)
LYMPHOCYTES # BLD AUTO: 1.09 K/UL (ref 1–4.8)
LYMPHOCYTES # BLD AUTO: 1.32 K/UL (ref 1–4.8)
LYMPHOCYTES # BLD AUTO: 1.38 K/UL (ref 1–4.8)
LYMPHOCYTES # BLD AUTO: 1.48 K/UL (ref 1–4.8)
LYMPHOCYTES # BLD AUTO: 1.49 K/UL (ref 1–4.8)
LYMPHOCYTES NFR BLD: 10.2 % (ref 22–41)
LYMPHOCYTES NFR BLD: 10.8 % (ref 22–41)
LYMPHOCYTES NFR BLD: 11.1 % (ref 22–41)
LYMPHOCYTES NFR BLD: 11.4 % (ref 22–41)
LYMPHOCYTES NFR BLD: 20.2 % (ref 22–41)
LYMPHOCYTES NFR BLD: 20.9 % (ref 22–41)
LYMPHOCYTES NFR BLD: 21 % (ref 22–41)
LYMPHOCYTES NFR BLD: 23.7 % (ref 22–41)
LYMPHOCYTES NFR BLD: 39.2 % (ref 22–41)
LYMPHOCYTES NFR BLD: 41 % (ref 22–41)
LYMPHOCYTES NFR BLD: 9.7 % (ref 22–41)
MAGNESIUM SERPL-MCNC: 1.6 MG/DL (ref 1.5–2.5)
MAGNESIUM SERPL-MCNC: 1.7 MG/DL (ref 1.5–2.5)
MAGNESIUM SERPL-MCNC: 2 MG/DL (ref 1.5–2.5)
MAGNESIUM SERPL-MCNC: 2 MG/DL (ref 1.5–2.5)
MAGNESIUM SERPL-MCNC: 2.3 MG/DL (ref 1.5–2.5)
MANUAL DIFF BLD: NORMAL
MANUAL DIFF BLD: NORMAL
MCH RBC QN AUTO: 29.9 PG (ref 27–33)
MCH RBC QN AUTO: 30.1 PG (ref 27–33)
MCH RBC QN AUTO: 30.2 PG (ref 27–33)
MCH RBC QN AUTO: 30.2 PG (ref 27–33)
MCH RBC QN AUTO: 30.3 PG (ref 27–33)
MCH RBC QN AUTO: 30.4 PG (ref 27–33)
MCH RBC QN AUTO: 30.5 PG (ref 27–33)
MCH RBC QN AUTO: 30.5 PG (ref 27–33)
MCH RBC QN AUTO: 30.6 PG (ref 27–33)
MCH RBC QN AUTO: 30.8 PG (ref 27–33)
MCH RBC QN AUTO: 30.8 PG (ref 27–33)
MCH RBC QN AUTO: 30.9 PG (ref 27–33)
MCH RBC QN AUTO: 31 PG (ref 27–33)
MCHC RBC AUTO-ENTMCNC: 31.7 G/DL (ref 32.3–36.5)
MCHC RBC AUTO-ENTMCNC: 32.6 G/DL (ref 32.3–36.5)
MCHC RBC AUTO-ENTMCNC: 32.9 G/DL (ref 32.3–36.5)
MCHC RBC AUTO-ENTMCNC: 33.2 G/DL (ref 32.3–36.5)
MCHC RBC AUTO-ENTMCNC: 33.3 G/DL (ref 32.3–36.5)
MCHC RBC AUTO-ENTMCNC: 33.5 G/DL (ref 32.3–36.5)
MCHC RBC AUTO-ENTMCNC: 33.6 G/DL (ref 32.3–36.5)
MCHC RBC AUTO-ENTMCNC: 33.7 G/DL (ref 32.3–36.5)
MCHC RBC AUTO-ENTMCNC: 33.8 G/DL (ref 32.3–36.5)
MCHC RBC AUTO-ENTMCNC: 34 G/DL (ref 32.3–36.5)
MCHC RBC AUTO-ENTMCNC: 34.2 G/DL (ref 32.3–36.5)
MCHC RBC AUTO-ENTMCNC: 34.4 G/DL (ref 32.3–36.5)
MCHC RBC AUTO-ENTMCNC: 34.9 G/DL (ref 32.3–36.5)
MCV RBC AUTO: 88.5 FL (ref 81.4–97.8)
MCV RBC AUTO: 88.6 FL (ref 81.4–97.8)
MCV RBC AUTO: 90 FL (ref 81.4–97.8)
MCV RBC AUTO: 90 FL (ref 81.4–97.8)
MCV RBC AUTO: 90.1 FL (ref 81.4–97.8)
MCV RBC AUTO: 90.2 FL (ref 81.4–97.8)
MCV RBC AUTO: 90.3 FL (ref 81.4–97.8)
MCV RBC AUTO: 90.5 FL (ref 81.4–97.8)
MCV RBC AUTO: 90.8 FL (ref 81.4–97.8)
MCV RBC AUTO: 90.9 FL (ref 81.4–97.8)
MCV RBC AUTO: 91 FL (ref 81.4–97.8)
MCV RBC AUTO: 93.4 FL (ref 81.4–97.8)
MCV RBC AUTO: 97.7 FL (ref 81.4–97.8)
MICRO URNS: ABNORMAL
MODE IMODE: ABNORMAL
MONOCYTES # BLD AUTO: 0.05 K/UL (ref 0–0.85)
MONOCYTES # BLD AUTO: 0.19 K/UL (ref 0–0.85)
MONOCYTES # BLD AUTO: 0.23 K/UL (ref 0–0.85)
MONOCYTES # BLD AUTO: 0.75 K/UL (ref 0–0.85)
MONOCYTES # BLD AUTO: 0.75 K/UL (ref 0–0.85)
MONOCYTES # BLD AUTO: 0.81 K/UL (ref 0–0.85)
MONOCYTES # BLD AUTO: 1.08 K/UL (ref 0–0.85)
MONOCYTES # BLD AUTO: 1.2 K/UL (ref 0–0.85)
MONOCYTES # BLD AUTO: 1.53 K/UL (ref 0–0.85)
MONOCYTES # BLD AUTO: 1.57 K/UL (ref 0–0.85)
MONOCYTES # BLD AUTO: 1.83 K/UL (ref 0–0.85)
MONOCYTES NFR BLD AUTO: 11.3 % (ref 0–13.4)
MONOCYTES NFR BLD AUTO: 11.4 % (ref 0–13.4)
MONOCYTES NFR BLD AUTO: 11.6 % (ref 0–13.4)
MONOCYTES NFR BLD AUTO: 12 % (ref 0–13.4)
MONOCYTES NFR BLD AUTO: 12.7 % (ref 0–13.4)
MONOCYTES NFR BLD AUTO: 14.6 % (ref 0–13.4)
MONOCYTES NFR BLD AUTO: 15.2 % (ref 0–13.4)
MONOCYTES NFR BLD AUTO: 15.5 % (ref 0–13.4)
MONOCYTES NFR BLD AUTO: 18 % (ref 0–13.4)
MONOCYTES NFR BLD AUTO: 3 % (ref 0–13.4)
MONOCYTES NFR BLD AUTO: 3 % (ref 0–13.4)
NEUTROPHILS # BLD AUTO: 0.82 K/UL (ref 1.82–7.42)
NEUTROPHILS # BLD AUTO: 0.92 K/UL (ref 1.82–7.42)
NEUTROPHILS # BLD AUTO: 11.27 K/UL (ref 1.82–7.42)
NEUTROPHILS # BLD AUTO: 2.26 K/UL (ref 1.82–7.42)
NEUTROPHILS # BLD AUTO: 3.05 K/UL (ref 1.82–7.42)
NEUTROPHILS # BLD AUTO: 3.07 K/UL (ref 1.82–7.42)
NEUTROPHILS # BLD AUTO: 4.79 K/UL (ref 1.82–7.42)
NEUTROPHILS # BLD AUTO: 5.08 K/UL (ref 1.82–7.42)
NEUTROPHILS # BLD AUTO: 6.78 K/UL (ref 1.82–7.42)
NEUTROPHILS # BLD AUTO: 9.9 K/UL (ref 1.82–7.42)
NEUTROPHILS # BLD AUTO: 9.96 K/UL (ref 1.82–7.42)
NEUTROPHILS NFR BLD: 41.2 % (ref 44–72)
NEUTROPHILS NFR BLD: 51 % (ref 44–72)
NEUTROPHILS NFR BLD: 54.2 % (ref 44–72)
NEUTROPHILS NFR BLD: 59 % (ref 44–72)
NEUTROPHILS NFR BLD: 59.2 % (ref 44–72)
NEUTROPHILS NFR BLD: 71.3 % (ref 44–72)
NEUTROPHILS NFR BLD: 71.7 % (ref 44–72)
NEUTROPHILS NFR BLD: 71.8 % (ref 44–72)
NEUTROPHILS NFR BLD: 73.2 % (ref 44–72)
NEUTROPHILS NFR BLD: 73.6 % (ref 44–72)
NEUTROPHILS NFR BLD: 76 % (ref 44–72)
NITRITE UR QL STRIP.AUTO: NEGATIVE
NRBC # BLD AUTO: 0 K/UL
NRBC BLD-RTO: 0 /100 WBC (ref 0–0.2)
O2/TOTAL GAS SETTING VFR VENT: 30 %
O2/TOTAL GAS SETTING VFR VENT: 30 %
O2/TOTAL GAS SETTING VFR VENT: 50 %
PCO2 BLDA: 30.8 MMHG (ref 26–37)
PCO2 BLDA: 32.4 MMHG (ref 26–37)
PCO2 BLDA: 37.8 MMHG (ref 26–37)
PCO2 BLDV: 31.1 MMHG (ref 41–51)
PCO2 BLDV: 38.3 MMHG (ref 41–51)
PCO2 TEMP ADJ BLDA: 29.7 MMHG (ref 26–37)
PCO2 TEMP ADJ BLDA: 33.7 MMHG (ref 26–37)
PCO2 TEMP ADJ BLDA: 37.3 MMHG (ref 26–37)
PCO2 TEMP ADJ BLDV: 32.1 MMHG (ref 41–51)
PCO2 TEMP ADJ BLDV: 37.5 MMHG (ref 41–51)
PEEP END EXPIRATORY PRESSURE IPEEP: 8 CMH20
PH BLDA: 7.46 [PH] (ref 7.4–7.5)
PH BLDA: 7.49 [PH] (ref 7.4–7.5)
PH BLDA: 7.5 [PH] (ref 7.4–7.5)
PH BLDV: 7.36 [PH] (ref 7.31–7.45)
PH BLDV: 7.48 [PH] (ref 7.31–7.45)
PH TEMP ADJ BLDA: 7.47 [PH] (ref 7.4–7.5)
PH TEMP ADJ BLDA: 7.48 [PH] (ref 7.4–7.5)
PH TEMP ADJ BLDA: 7.51 [PH] (ref 7.4–7.5)
PH TEMP ADJ BLDV: 7.37 [PH] (ref 7.31–7.45)
PH TEMP ADJ BLDV: 7.47 [PH] (ref 7.31–7.45)
PH UR STRIP.AUTO: 7.5 [PH] (ref 5–8)
PHOSPHATE SERPL-MCNC: 2.8 MG/DL (ref 2.5–4.5)
PHOSPHATE SERPL-MCNC: 3.5 MG/DL (ref 2.5–4.5)
PHOSPHATE SERPL-MCNC: 3.7 MG/DL (ref 2.5–4.5)
PHOSPHATE SERPL-MCNC: 4.6 MG/DL (ref 2.5–4.5)
PLATELET # BLD AUTO: 104 K/UL (ref 164–446)
PLATELET # BLD AUTO: 153 K/UL (ref 164–446)
PLATELET # BLD AUTO: 319 K/UL (ref 164–446)
PLATELET # BLD AUTO: 374 K/UL (ref 164–446)
PLATELET # BLD AUTO: 384 K/UL (ref 164–446)
PLATELET # BLD AUTO: 384 K/UL (ref 164–446)
PLATELET # BLD AUTO: 399 K/UL (ref 164–446)
PLATELET # BLD AUTO: 413 K/UL (ref 164–446)
PLATELET # BLD AUTO: 429 K/UL (ref 164–446)
PLATELET # BLD AUTO: 449 K/UL (ref 164–446)
PLATELET # BLD AUTO: 58 K/UL (ref 164–446)
PLATELET # BLD AUTO: 59 K/UL (ref 164–446)
PLATELET # BLD AUTO: 98 K/UL (ref 164–446)
PLATELET BLD QL SMEAR: NORMAL
PLATELET BLD QL SMEAR: NORMAL
PLATELETS.RETICULATED NFR BLD AUTO: 0.6 % (ref 0.6–13.1)
PLATELETS.RETICULATED NFR BLD AUTO: 1.5 % (ref 0.6–13.1)
PLATELETS.RETICULATED NFR BLD AUTO: 1.6 % (ref 0.6–13.1)
PMV BLD AUTO: 10 FL (ref 9–12.9)
PMV BLD AUTO: 10.4 FL (ref 9–12.9)
PMV BLD AUTO: 8.1 FL (ref 9–12.9)
PMV BLD AUTO: 8.2 FL (ref 9–12.9)
PMV BLD AUTO: 8.3 FL (ref 9–12.9)
PMV BLD AUTO: 8.4 FL (ref 9–12.9)
PMV BLD AUTO: 9.2 FL (ref 9–12.9)
PMV BLD AUTO: 9.5 FL (ref 9–12.9)
PMV BLD AUTO: 9.8 FL (ref 9–12.9)
PO2 BLDA: 106 MMHG (ref 64–87)
PO2 BLDA: 152 MMHG (ref 64–87)
PO2 BLDA: 84 MMHG (ref 64–87)
PO2 BLDV: 27.8 MMHG (ref 25–40)
PO2 BLDV: 84 MMHG (ref 25–40)
PO2 TEMP ADJ BLDA: 104 MMHG (ref 64–87)
PO2 TEMP ADJ BLDA: 158 MMHG (ref 64–87)
PO2 TEMP ADJ BLDA: 80 MMHG (ref 64–87)
PO2 TEMP ADJ BLDV: 26.8 MMHG (ref 25–40)
PO2 TEMP ADJ BLDV: 87.9 MMHG (ref 25–40)
POTASSIUM SERPL-SCNC: 3.3 MMOL/L (ref 3.6–5.5)
POTASSIUM SERPL-SCNC: 3.5 MMOL/L (ref 3.6–5.5)
POTASSIUM SERPL-SCNC: 3.6 MMOL/L (ref 3.6–5.5)
POTASSIUM SERPL-SCNC: 3.8 MMOL/L (ref 3.6–5.5)
POTASSIUM SERPL-SCNC: 3.9 MMOL/L (ref 3.6–5.5)
POTASSIUM SERPL-SCNC: 4.1 MMOL/L (ref 3.6–5.5)
POTASSIUM SERPL-SCNC: 4.1 MMOL/L (ref 3.6–5.5)
POTASSIUM SERPL-SCNC: 4.2 MMOL/L (ref 3.6–5.5)
POTASSIUM SERPL-SCNC: 4.3 MMOL/L (ref 3.6–5.5)
POTASSIUM SERPL-SCNC: 4.3 MMOL/L (ref 3.6–5.5)
POTASSIUM SERPL-SCNC: 4.8 MMOL/L (ref 3.6–5.5)
POTASSIUM SERPL-SCNC: 4.9 MMOL/L (ref 3.6–5.5)
PREALB SERPL-MCNC: 8.6 MG/DL (ref 18–38)
PREALB SERPL-MCNC: 8.8 MG/DL (ref 18–38)
PREALB SERPL-MCNC: 9.9 MG/DL (ref 18–38)
PROCALCITONIN SERPL-MCNC: 0.12 NG/ML
PROCALCITONIN SERPL-MCNC: 0.15 NG/ML
PROCALCITONIN SERPL-MCNC: 0.19 NG/ML
PROT SERPL-MCNC: 5.8 G/DL (ref 6–8.2)
PROT SERPL-MCNC: 5.9 G/DL (ref 6–8.2)
PROT SERPL-MCNC: 6.3 G/DL (ref 6–8.2)
PROT SERPL-MCNC: 6.6 G/DL (ref 6–8.2)
PROT SERPL-MCNC: 7.2 G/DL (ref 6–8.2)
PROT UR QL STRIP: NEGATIVE MG/DL
PROTHROMBIN TIME: 16.1 SEC (ref 12–14.6)
RBC # BLD AUTO: 3.15 M/UL (ref 4.7–6.1)
RBC # BLD AUTO: 3.33 M/UL (ref 4.7–6.1)
RBC # BLD AUTO: 3.33 M/UL (ref 4.7–6.1)
RBC # BLD AUTO: 3.42 M/UL (ref 4.7–6.1)
RBC # BLD AUTO: 3.44 M/UL (ref 4.7–6.1)
RBC # BLD AUTO: 3.46 M/UL (ref 4.7–6.1)
RBC # BLD AUTO: 3.48 M/UL (ref 4.7–6.1)
RBC # BLD AUTO: 3.51 M/UL (ref 4.7–6.1)
RBC # BLD AUTO: 3.7 M/UL (ref 4.7–6.1)
RBC # BLD AUTO: 3.7 M/UL (ref 4.7–6.1)
RBC # BLD AUTO: 3.71 M/UL (ref 4.7–6.1)
RBC # BLD AUTO: 3.9 M/UL (ref 4.7–6.1)
RBC # BLD AUTO: 4.7 M/UL (ref 4.7–6.1)
RBC # URNS HPF: ABNORMAL /HPF
RBC BLD AUTO: NORMAL
RBC BLD AUTO: NORMAL
RBC UR QL AUTO: ABNORMAL
RSV RNA SPEC QL NAA+PROBE: NEGATIVE
SAO2 % BLDA: 100 % (ref 93–99)
SAO2 % BLDA: 97 % (ref 93–99)
SAO2 % BLDA: 98 % (ref 93–99)
SAO2 % BLDV: 45.7 %
SAO2 % BLDV: 96.1 %
SARS-COV-2 RNA RESP QL NAA+PROBE: NOTDETECTED
SODIUM SERPL-SCNC: 135 MMOL/L (ref 135–145)
SODIUM SERPL-SCNC: 136 MMOL/L (ref 135–145)
SODIUM SERPL-SCNC: 137 MMOL/L (ref 135–145)
SODIUM SERPL-SCNC: 137 MMOL/L (ref 135–145)
SODIUM SERPL-SCNC: 138 MMOL/L (ref 135–145)
SODIUM SERPL-SCNC: 138 MMOL/L (ref 135–145)
SODIUM SERPL-SCNC: 139 MMOL/L (ref 135–145)
SODIUM SERPL-SCNC: 140 MMOL/L (ref 135–145)
SODIUM SERPL-SCNC: 140 MMOL/L (ref 135–145)
SP GR UR STRIP.AUTO: 1.01
SPECIMEN DRAWN FROM PATIENT: ABNORMAL
SPECIMEN SOURCE: ABNORMAL
SPECIMEN SOURCE: NORMAL
TIDAL VOLUME IVT: 420 ML
TROPONIN T SERPL-MCNC: 19 NG/L (ref 6–19)
TROPONIN T SERPL-MCNC: 20 NG/L (ref 6–19)
TROPONIN T SERPL-MCNC: 32 NG/L (ref 6–19)
UROBILINOGEN UR STRIP.AUTO-MCNC: 0.2 MG/DL
VARIANT LYMPHS BLD QL SMEAR: NORMAL
WBC # BLD AUTO: 1.8 K/UL (ref 4.8–10.8)
WBC # BLD AUTO: 1.8 K/UL (ref 4.8–10.8)
WBC # BLD AUTO: 13.6 K/UL (ref 4.8–10.8)
WBC # BLD AUTO: 13.8 K/UL (ref 4.8–10.8)
WBC # BLD AUTO: 15.3 K/UL (ref 4.8–10.8)
WBC # BLD AUTO: 2 K/UL (ref 4.8–10.8)
WBC # BLD AUTO: 3 K/UL (ref 4.8–10.8)
WBC # BLD AUTO: 4.2 K/UL (ref 4.8–10.8)
WBC # BLD AUTO: 5.2 K/UL (ref 4.8–10.8)
WBC # BLD AUTO: 5.2 K/UL (ref 4.8–10.8)
WBC # BLD AUTO: 6.3 K/UL (ref 4.8–10.8)
WBC # BLD AUTO: 7.1 K/UL (ref 4.8–10.8)
WBC # BLD AUTO: 9.5 K/UL (ref 4.8–10.8)
WBC #/AREA URNS HPF: ABNORMAL /HPF
WBC TOXIC VACUOLES BLD QL SMEAR: NORMAL

## 2024-01-01 PROCEDURE — 700111 HCHG RX REV CODE 636 W/ 250 OVERRIDE (IP): Mod: JA | Performed by: NURSE PRACTITIONER

## 2024-01-01 PROCEDURE — 84145 PROCALCITONIN (PCT): CPT

## 2024-01-01 PROCEDURE — 71045 X-RAY EXAM CHEST 1 VIEW: CPT

## 2024-01-01 PROCEDURE — A9270 NON-COVERED ITEM OR SERVICE: HCPCS | Performed by: INTERNAL MEDICINE

## 2024-01-01 PROCEDURE — 700111 HCHG RX REV CODE 636 W/ 250 OVERRIDE (IP): Performed by: NURSE PRACTITIONER

## 2024-01-01 PROCEDURE — 700102 HCHG RX REV CODE 250 W/ 637 OVERRIDE(OP): Performed by: INTERNAL MEDICINE

## 2024-01-01 PROCEDURE — 99291 CRITICAL CARE FIRST HOUR: CPT | Performed by: NURSE PRACTITIONER

## 2024-01-01 PROCEDURE — 84100 ASSAY OF PHOSPHORUS: CPT

## 2024-01-01 PROCEDURE — 94003 VENT MGMT INPAT SUBQ DAY: CPT

## 2024-01-01 PROCEDURE — 700105 HCHG RX REV CODE 258: Performed by: HOSPITALIST

## 2024-01-01 PROCEDURE — 80048 BASIC METABOLIC PNL TOTAL CA: CPT

## 2024-01-01 PROCEDURE — C9113 INJ PANTOPRAZOLE SODIUM, VIA: HCPCS | Performed by: NURSE PRACTITIONER

## 2024-01-01 PROCEDURE — A9270 NON-COVERED ITEM OR SERVICE: HCPCS | Performed by: NURSE PRACTITIONER

## 2024-01-01 PROCEDURE — 82962 GLUCOSE BLOOD TEST: CPT

## 2024-01-01 PROCEDURE — 700111 HCHG RX REV CODE 636 W/ 250 OVERRIDE (IP): Mod: JZ

## 2024-01-01 PROCEDURE — 85025 COMPLETE CBC W/AUTO DIFF WBC: CPT

## 2024-01-01 PROCEDURE — 700102 HCHG RX REV CODE 250 W/ 637 OVERRIDE(OP): Performed by: NURSE PRACTITIONER

## 2024-01-01 PROCEDURE — 770022 HCHG ROOM/CARE - ICU (200)

## 2024-01-01 PROCEDURE — 80053 COMPREHEN METABOLIC PANEL: CPT

## 2024-01-01 PROCEDURE — 97602 WOUND(S) CARE NON-SELECTIVE: CPT

## 2024-01-01 PROCEDURE — 700111 HCHG RX REV CODE 636 W/ 250 OVERRIDE (IP): Mod: JZ | Performed by: HOSPITALIST

## 2024-01-01 PROCEDURE — 99223 1ST HOSP IP/OBS HIGH 75: CPT | Performed by: HOSPITALIST

## 2024-01-01 PROCEDURE — 700105 HCHG RX REV CODE 258: Performed by: INTERNAL MEDICINE

## 2024-01-01 PROCEDURE — 700111 HCHG RX REV CODE 636 W/ 250 OVERRIDE (IP): Mod: JZ | Performed by: INTERNAL MEDICINE

## 2024-01-01 PROCEDURE — 95714 VEEG EA 12-26 HR UNMNTR: CPT | Performed by: STUDENT IN AN ORGANIZED HEALTH CARE EDUCATION/TRAINING PROGRAM

## 2024-01-01 PROCEDURE — 700105 HCHG RX REV CODE 258: Performed by: NURSE PRACTITIONER

## 2024-01-01 PROCEDURE — 36600 WITHDRAWAL OF ARTERIAL BLOOD: CPT

## 2024-01-01 PROCEDURE — 700102 HCHG RX REV CODE 250 W/ 637 OVERRIDE(OP): Performed by: HOSPITALIST

## 2024-01-01 PROCEDURE — A9270 NON-COVERED ITEM OR SERVICE: HCPCS | Performed by: HOSPITALIST

## 2024-01-01 PROCEDURE — 94760 N-INVAS EAR/PLS OXIMETRY 1: CPT

## 2024-01-01 PROCEDURE — 82962 GLUCOSE BLOOD TEST: CPT | Mod: 91

## 2024-01-01 PROCEDURE — 99497 ADVNCD CARE PLAN 30 MIN: CPT | Mod: GC | Performed by: STUDENT IN AN ORGANIZED HEALTH CARE EDUCATION/TRAINING PROGRAM

## 2024-01-01 PROCEDURE — 93005 ELECTROCARDIOGRAM TRACING: CPT

## 2024-01-01 PROCEDURE — 94799 UNLISTED PULMONARY SVC/PX: CPT

## 2024-01-01 PROCEDURE — 96366 THER/PROPH/DIAG IV INF ADDON: CPT

## 2024-01-01 PROCEDURE — 83735 ASSAY OF MAGNESIUM: CPT

## 2024-01-01 PROCEDURE — 700102 HCHG RX REV CODE 250 W/ 637 OVERRIDE(OP): Performed by: EMERGENCY MEDICINE

## 2024-01-01 PROCEDURE — C9113 INJ PANTOPRAZOLE SODIUM, VIA: HCPCS | Performed by: HOSPITALIST

## 2024-01-01 PROCEDURE — 99285 EMERGENCY DEPT VISIT HI MDM: CPT

## 2024-01-01 PROCEDURE — 85007 BL SMEAR W/DIFF WBC COUNT: CPT

## 2024-01-01 PROCEDURE — 82803 BLOOD GASES ANY COMBINATION: CPT

## 2024-01-01 PROCEDURE — G0480 DRUG TEST DEF 1-7 CLASSES: HCPCS

## 2024-01-01 PROCEDURE — 0241U HCHG SARS-COV-2 COVID-19 NFCT DS RESP RNA 4 TRGT MIC: CPT

## 2024-01-01 PROCEDURE — A9579 GAD-BASE MR CONTRAST NOS,1ML: HCPCS | Performed by: NURSE PRACTITIONER

## 2024-01-01 PROCEDURE — 700101 HCHG RX REV CODE 250: Performed by: STUDENT IN AN ORGANIZED HEALTH CARE EDUCATION/TRAINING PROGRAM

## 2024-01-01 PROCEDURE — 700117 HCHG RX CONTRAST REV CODE 255: Performed by: NURSE PRACTITIONER

## 2024-01-01 PROCEDURE — 700117 HCHG RX CONTRAST REV CODE 255: Performed by: EMERGENCY MEDICINE

## 2024-01-01 PROCEDURE — 96375 TX/PRO/DX INJ NEW DRUG ADDON: CPT

## 2024-01-01 PROCEDURE — 36415 COLL VENOUS BLD VENIPUNCTURE: CPT

## 2024-01-01 PROCEDURE — 99292 CRITICAL CARE ADDL 30 MIN: CPT | Performed by: INTERNAL MEDICINE

## 2024-01-01 PROCEDURE — 4A10X4Z MONITORING OF CENTRAL NERVOUS ELECTRICAL ACTIVITY, EXTERNAL APPROACH: ICD-10-PCS | Performed by: STUDENT IN AN ORGANIZED HEALTH CARE EDUCATION/TRAINING PROGRAM

## 2024-01-01 PROCEDURE — 99232 SBSQ HOSP IP/OBS MODERATE 35: CPT | Performed by: PSYCHIATRY & NEUROLOGY

## 2024-01-01 PROCEDURE — 302136 NUTRITION PUMP: Performed by: INTERNAL MEDICINE

## 2024-01-01 PROCEDURE — 99292 CRITICAL CARE ADDL 30 MIN: CPT | Performed by: NURSE PRACTITIONER

## 2024-01-01 PROCEDURE — 82271 OCCULT BLOOD OTHER SOURCES: CPT

## 2024-01-01 PROCEDURE — 99233 SBSQ HOSP IP/OBS HIGH 50: CPT | Performed by: PSYCHIATRY & NEUROLOGY

## 2024-01-01 PROCEDURE — 85027 COMPLETE CBC AUTOMATED: CPT

## 2024-01-01 PROCEDURE — 700111 HCHG RX REV CODE 636 W/ 250 OVERRIDE (IP): Mod: JZ | Performed by: EMERGENCY MEDICINE

## 2024-01-01 PROCEDURE — 84134 ASSAY OF PREALBUMIN: CPT

## 2024-01-01 PROCEDURE — 94002 VENT MGMT INPAT INIT DAY: CPT

## 2024-01-01 PROCEDURE — 96376 TX/PRO/DX INJ SAME DRUG ADON: CPT

## 2024-01-01 PROCEDURE — 99231 SBSQ HOSP IP/OBS SF/LOW 25: CPT | Mod: 25 | Performed by: PSYCHIATRY & NEUROLOGY

## 2024-01-01 PROCEDURE — 83605 ASSAY OF LACTIC ACID: CPT

## 2024-01-01 PROCEDURE — 85610 PROTHROMBIN TIME: CPT

## 2024-01-01 PROCEDURE — 700111 HCHG RX REV CODE 636 W/ 250 OVERRIDE (IP): Performed by: STUDENT IN AN ORGANIZED HEALTH CARE EDUCATION/TRAINING PROGRAM

## 2024-01-01 PROCEDURE — 99232 SBSQ HOSP IP/OBS MODERATE 35: CPT | Performed by: INTERNAL MEDICINE

## 2024-01-01 PROCEDURE — 99232 SBSQ HOSP IP/OBS MODERATE 35: CPT | Mod: GC | Performed by: PSYCHIATRY & NEUROLOGY

## 2024-01-01 PROCEDURE — 99231 SBSQ HOSP IP/OBS SF/LOW 25: CPT | Performed by: PSYCHIATRY & NEUROLOGY

## 2024-01-01 PROCEDURE — C9113 INJ PANTOPRAZOLE SODIUM, VIA: HCPCS | Performed by: EMERGENCY MEDICINE

## 2024-01-01 PROCEDURE — 99239 HOSP IP/OBS DSCHRG MGMT >30: CPT | Performed by: INTERNAL MEDICINE

## 2024-01-01 PROCEDURE — 700105 HCHG RX REV CODE 258: Performed by: EMERGENCY MEDICINE

## 2024-01-01 PROCEDURE — 85055 RETICULATED PLATELET ASSAY: CPT | Mod: 91

## 2024-01-01 PROCEDURE — 86140 C-REACTIVE PROTEIN: CPT

## 2024-01-01 PROCEDURE — 99232 SBSQ HOSP IP/OBS MODERATE 35: CPT | Mod: 25 | Performed by: STUDENT IN AN ORGANIZED HEALTH CARE EDUCATION/TRAINING PROGRAM

## 2024-01-01 PROCEDURE — 84484 ASSAY OF TROPONIN QUANT: CPT

## 2024-01-01 PROCEDURE — 770001 HCHG ROOM/CARE - MED/SURG/GYN PRIV*

## 2024-01-01 PROCEDURE — 81001 URINALYSIS AUTO W/SCOPE: CPT

## 2024-01-01 PROCEDURE — 85055 RETICULATED PLATELET ASSAY: CPT

## 2024-01-01 PROCEDURE — 5A1955Z RESPIRATORY VENTILATION, GREATER THAN 96 CONSECUTIVE HOURS: ICD-10-PCS | Performed by: INTERNAL MEDICINE

## 2024-01-01 PROCEDURE — 93005 ELECTROCARDIOGRAM TRACING: CPT | Performed by: EMERGENCY MEDICINE

## 2024-01-01 PROCEDURE — 83690 ASSAY OF LIPASE: CPT

## 2024-01-01 PROCEDURE — 95720 EEG PHY/QHP EA INCR W/VEEG: CPT | Performed by: STUDENT IN AN ORGANIZED HEALTH CARE EDUCATION/TRAINING PROGRAM

## 2024-01-01 PROCEDURE — 70553 MRI BRAIN STEM W/O & W/DYE: CPT

## 2024-01-01 PROCEDURE — 96372 THER/PROPH/DIAG INJ SC/IM: CPT

## 2024-01-01 PROCEDURE — 95700 EEG CONT REC W/VID EEG TECH: CPT | Performed by: STUDENT IN AN ORGANIZED HEALTH CARE EDUCATION/TRAINING PROGRAM

## 2024-01-01 PROCEDURE — 87040 BLOOD CULTURE FOR BACTERIA: CPT | Mod: 91

## 2024-01-01 PROCEDURE — 74177 CT ABD & PELVIS W/CONTRAST: CPT

## 2024-01-01 PROCEDURE — 700111 HCHG RX REV CODE 636 W/ 250 OVERRIDE (IP): Mod: JG | Performed by: NURSE PRACTITIONER

## 2024-01-01 PROCEDURE — 87338 HPYLORI STOOL AG IA: CPT

## 2024-01-01 PROCEDURE — 99222 1ST HOSP IP/OBS MODERATE 55: CPT | Mod: 25 | Performed by: PSYCHIATRY & NEUROLOGY

## 2024-01-01 PROCEDURE — 96365 THER/PROPH/DIAG IV INF INIT: CPT

## 2024-01-01 PROCEDURE — 99222 1ST HOSP IP/OBS MODERATE 55: CPT | Mod: GC,25 | Performed by: STUDENT IN AN ORGANIZED HEALTH CARE EDUCATION/TRAINING PROGRAM

## 2024-01-01 PROCEDURE — 700111 HCHG RX REV CODE 636 W/ 250 OVERRIDE (IP): Performed by: HOSPITALIST

## 2024-01-01 PROCEDURE — 99497 ADVNCD CARE PLAN 30 MIN: CPT | Performed by: STUDENT IN AN ORGANIZED HEALTH CARE EDUCATION/TRAINING PROGRAM

## 2024-01-01 PROCEDURE — 99291 CRITICAL CARE FIRST HOUR: CPT | Performed by: INTERNAL MEDICINE

## 2024-01-01 PROCEDURE — 95718 EEG PHYS/QHP 2-12 HR W/VEEG: CPT | Performed by: STUDENT IN AN ORGANIZED HEALTH CARE EDUCATION/TRAINING PROGRAM

## 2024-01-01 RX ORDER — SODIUM CHLORIDE, SODIUM LACTATE, POTASSIUM CHLORIDE, CALCIUM CHLORIDE 600; 310; 30; 20 MG/100ML; MG/100ML; MG/100ML; MG/100ML
INJECTION, SOLUTION INTRAVENOUS CONTINUOUS
Status: DISCONTINUED | OUTPATIENT
Start: 2024-01-01 | End: 2024-01-01

## 2024-01-01 RX ORDER — LEVETIRACETAM 500 MG/1
1000 TABLET ORAL 2 TIMES DAILY
Status: DISCONTINUED | OUTPATIENT
Start: 2024-01-01 | End: 2024-01-01

## 2024-01-01 RX ORDER — AMOXICILLIN 250 MG
2 CAPSULE ORAL 2 TIMES DAILY
Status: DISCONTINUED | OUTPATIENT
Start: 2024-01-01 | End: 2024-01-01 | Stop reason: HOSPADM

## 2024-01-01 RX ORDER — ACETAMINOPHEN 325 MG/1
650 TABLET ORAL EVERY 6 HOURS PRN
Status: DISCONTINUED | OUTPATIENT
Start: 2024-01-01 | End: 2024-01-01 | Stop reason: HOSPADM

## 2024-01-01 RX ORDER — OMEPRAZOLE 40 MG/1
40 CAPSULE, DELAYED RELEASE ORAL 2 TIMES DAILY
Qty: 28 CAPSULE | Refills: 0 | Status: SHIPPED | OUTPATIENT
Start: 2024-01-01 | End: 2024-01-01

## 2024-01-01 RX ORDER — PANTOPRAZOLE SODIUM 40 MG/10ML
80 INJECTION, POWDER, LYOPHILIZED, FOR SOLUTION INTRAVENOUS ONCE
Status: COMPLETED | OUTPATIENT
Start: 2024-01-01 | End: 2024-01-01

## 2024-01-01 RX ORDER — PANTOPRAZOLE SODIUM 40 MG/10ML
40 INJECTION, POWDER, LYOPHILIZED, FOR SOLUTION INTRAVENOUS 2 TIMES DAILY
Status: DISCONTINUED | OUTPATIENT
Start: 2024-01-01 | End: 2024-01-01

## 2024-01-01 RX ORDER — DEXTROSE MONOHYDRATE 25 G/50ML
25 INJECTION, SOLUTION INTRAVENOUS
Status: DISCONTINUED | OUTPATIENT
Start: 2024-01-01 | End: 2024-01-01 | Stop reason: HOSPADM

## 2024-01-01 RX ORDER — LEVETIRACETAM 500 MG/5ML
2000 INJECTION, SOLUTION, CONCENTRATE INTRAVENOUS ONCE
Status: COMPLETED | OUTPATIENT
Start: 2024-01-01 | End: 2024-01-01

## 2024-01-01 RX ORDER — METRONIDAZOLE 500 MG/1
500 TABLET ORAL EVERY 12 HOURS
Qty: 6 TABLET | Refills: 0 | Status: SHIPPED | OUTPATIENT
Start: 2024-01-01 | End: 2024-01-01

## 2024-01-01 RX ORDER — HYDROMORPHONE HYDROCHLORIDE 1 MG/ML
0.25 INJECTION, SOLUTION INTRAMUSCULAR; INTRAVENOUS; SUBCUTANEOUS
Status: DISCONTINUED | OUTPATIENT
Start: 2024-01-01 | End: 2024-01-01 | Stop reason: HOSPADM

## 2024-01-01 RX ORDER — SODIUM CHLORIDE, SODIUM LACTATE, POTASSIUM CHLORIDE, AND CALCIUM CHLORIDE .6; .31; .03; .02 G/100ML; G/100ML; G/100ML; G/100ML
500 INJECTION, SOLUTION INTRAVENOUS ONCE
Status: ACTIVE | OUTPATIENT
Start: 2024-01-01 | End: 2024-01-01

## 2024-01-01 RX ORDER — METRONIDAZOLE 500 MG/1
500 TABLET ORAL 3 TIMES DAILY
Qty: 42 TABLET | Refills: 0 | Status: ACTIVE | OUTPATIENT
Start: 2024-01-01 | End: 2024-01-01

## 2024-01-01 RX ORDER — AMOXICILLIN 250 MG
2 CAPSULE ORAL 2 TIMES DAILY
Status: DISCONTINUED | OUTPATIENT
Start: 2024-01-01 | End: 2024-01-01

## 2024-01-01 RX ORDER — HYDROMORPHONE HYDROCHLORIDE 2 MG/ML
2 INJECTION, SOLUTION INTRAMUSCULAR; INTRAVENOUS; SUBCUTANEOUS
Status: DISCONTINUED | OUTPATIENT
Start: 2024-01-01 | End: 2024-01-01

## 2024-01-01 RX ORDER — HALOPERIDOL 5 MG/ML
5 INJECTION INTRAMUSCULAR 3 TIMES DAILY PRN
Status: DISCONTINUED | OUTPATIENT
Start: 2024-01-01 | End: 2024-01-01 | Stop reason: HOSPADM

## 2024-01-01 RX ORDER — LORAZEPAM 2 MG/ML
4 INJECTION INTRAMUSCULAR
Status: DISCONTINUED | OUTPATIENT
Start: 2024-01-01 | End: 2024-01-01 | Stop reason: HOSPADM

## 2024-01-01 RX ORDER — DEXTROSE MONOHYDRATE 25 G/50ML
25 INJECTION, SOLUTION INTRAVENOUS
Status: DISCONTINUED | OUTPATIENT
Start: 2024-01-01 | End: 2024-01-01

## 2024-01-01 RX ORDER — INSULIN LISPRO 100 [IU]/ML
5 INJECTION, SOLUTION INTRAVENOUS; SUBCUTANEOUS
COMMUNITY
Start: 2023-01-01

## 2024-01-01 RX ORDER — ENOXAPARIN SODIUM 100 MG/ML
40 INJECTION SUBCUTANEOUS DAILY
Status: DISCONTINUED | OUTPATIENT
Start: 2024-01-01 | End: 2024-01-01 | Stop reason: HOSPADM

## 2024-01-01 RX ORDER — TETRACYCLINE HYDROCHLORIDE 500 MG/1
500 CAPSULE ORAL 4 TIMES DAILY
Qty: 56 CAPSULE | Refills: 0 | Status: ACTIVE | OUTPATIENT
Start: 2024-01-01 | End: 2024-01-01

## 2024-01-01 RX ORDER — LANSOPRAZOLE 30 MG/1
30 TABLET, ORALLY DISINTEGRATING, DELAYED RELEASE ORAL 2 TIMES DAILY
Status: DISCONTINUED | OUTPATIENT
Start: 2024-01-01 | End: 2024-01-01

## 2024-01-01 RX ORDER — ATROPINE SULFATE 10 MG/ML
2 SOLUTION/ DROPS OPHTHALMIC EVERY 4 HOURS PRN
Status: DISCONTINUED | OUTPATIENT
Start: 2024-01-01 | End: 2024-01-01 | Stop reason: HOSPADM

## 2024-01-01 RX ORDER — ACETAMINOPHEN 325 MG/1
650 TABLET ORAL EVERY 6 HOURS PRN
Status: DISCONTINUED | OUTPATIENT
Start: 2024-01-01 | End: 2024-01-01

## 2024-01-01 RX ORDER — ONDANSETRON 2 MG/ML
4 INJECTION INTRAMUSCULAR; INTRAVENOUS ONCE
Status: DISCONTINUED | OUTPATIENT
Start: 2024-01-01 | End: 2024-01-01

## 2024-01-01 RX ORDER — ONDANSETRON 4 MG/1
8 TABLET, ORALLY DISINTEGRATING ORAL EVERY 8 HOURS PRN
Status: DISCONTINUED | OUTPATIENT
Start: 2024-01-01 | End: 2024-01-01 | Stop reason: HOSPADM

## 2024-01-01 RX ORDER — METRONIDAZOLE 500 MG/1
500 TABLET ORAL EVERY 12 HOURS
Status: DISCONTINUED | OUTPATIENT
Start: 2024-01-01 | End: 2024-01-01 | Stop reason: HOSPADM

## 2024-01-01 RX ORDER — LABETALOL HYDROCHLORIDE 5 MG/ML
10-20 INJECTION, SOLUTION INTRAVENOUS EVERY 4 HOURS PRN
Status: DISCONTINUED | OUTPATIENT
Start: 2024-01-01 | End: 2024-01-01

## 2024-01-01 RX ORDER — PANTOPRAZOLE SODIUM 40 MG/10ML
40 INJECTION, POWDER, LYOPHILIZED, FOR SOLUTION INTRAVENOUS DAILY
Status: DISCONTINUED | OUTPATIENT
Start: 2024-01-01 | End: 2024-01-01

## 2024-01-01 RX ORDER — ONDANSETRON 4 MG/1
4 TABLET, ORALLY DISINTEGRATING ORAL EVERY 4 HOURS PRN
Status: DISCONTINUED | OUTPATIENT
Start: 2024-01-01 | End: 2024-01-01

## 2024-01-01 RX ORDER — METRONIDAZOLE 500 MG/1
500 TABLET ORAL EVERY 8 HOURS
Status: COMPLETED | OUTPATIENT
Start: 2024-01-01 | End: 2024-01-01

## 2024-01-01 RX ORDER — ONDANSETRON 2 MG/ML
4 INJECTION INTRAMUSCULAR; INTRAVENOUS EVERY 4 HOURS PRN
Status: DISCONTINUED | OUTPATIENT
Start: 2024-01-01 | End: 2024-01-01

## 2024-01-01 RX ORDER — OXYCODONE HYDROCHLORIDE 5 MG/1
2.5 TABLET ORAL
Status: DISCONTINUED | OUTPATIENT
Start: 2024-01-01 | End: 2024-01-01 | Stop reason: HOSPADM

## 2024-01-01 RX ORDER — OCTREOTIDE ACETATE 100 UG/ML
50 INJECTION, SOLUTION INTRAVENOUS; SUBCUTANEOUS EVERY 6 HOURS
Status: DISCONTINUED | OUTPATIENT
Start: 2024-01-01 | End: 2024-01-01

## 2024-01-01 RX ORDER — LEVETIRACETAM 500 MG/5ML
1000 INJECTION, SOLUTION, CONCENTRATE INTRAVENOUS EVERY 12 HOURS
Status: DISCONTINUED | OUTPATIENT
Start: 2024-01-01 | End: 2024-01-01

## 2024-01-01 RX ORDER — DEXTROSE MONOHYDRATE 25 G/50ML
12.5-25 INJECTION, SOLUTION INTRAVENOUS PRN
Status: DISCONTINUED | OUTPATIENT
Start: 2024-01-01 | End: 2024-01-01

## 2024-01-01 RX ORDER — HYDROMORPHONE HYDROCHLORIDE 2 MG/ML
2 INJECTION, SOLUTION INTRAMUSCULAR; INTRAVENOUS; SUBCUTANEOUS
Status: DISCONTINUED | OUTPATIENT
Start: 2024-01-01 | End: 2024-01-01 | Stop reason: HOSPADM

## 2024-01-01 RX ORDER — ENOXAPARIN SODIUM 100 MG/ML
40 INJECTION SUBCUTANEOUS DAILY
Status: DISCONTINUED | OUTPATIENT
Start: 2024-01-01 | End: 2024-01-01

## 2024-01-01 RX ORDER — ONDANSETRON 2 MG/ML
8 INJECTION INTRAMUSCULAR; INTRAVENOUS EVERY 8 HOURS PRN
Status: DISCONTINUED | OUTPATIENT
Start: 2024-01-01 | End: 2024-01-01 | Stop reason: HOSPADM

## 2024-01-01 RX ORDER — POLYETHYLENE GLYCOL 3350 17 G/17G
1 POWDER, FOR SOLUTION ORAL
Status: DISCONTINUED | OUTPATIENT
Start: 2024-01-01 | End: 2024-01-01

## 2024-01-01 RX ORDER — AMOXICILLIN AND CLAVULANATE POTASSIUM 875; 125 MG/1; MG/1
1 TABLET, FILM COATED ORAL 2 TIMES DAILY
Qty: 4 TABLET | Refills: 0 | Status: SHIPPED | OUTPATIENT
Start: 2024-01-01 | End: 2024-01-01

## 2024-01-01 RX ORDER — SODIUM CHLORIDE 9 MG/ML
INJECTION, SOLUTION INTRAVENOUS CONTINUOUS
Status: DISCONTINUED | OUTPATIENT
Start: 2024-01-01 | End: 2024-01-01 | Stop reason: HOSPADM

## 2024-01-01 RX ORDER — HYDRALAZINE HYDROCHLORIDE 20 MG/ML
10-20 INJECTION INTRAMUSCULAR; INTRAVENOUS EVERY 4 HOURS PRN
Status: DISCONTINUED | OUTPATIENT
Start: 2024-01-01 | End: 2024-01-01 | Stop reason: HOSPADM

## 2024-01-01 RX ORDER — IPRATROPIUM BROMIDE AND ALBUTEROL SULFATE 2.5; .5 MG/3ML; MG/3ML
3 SOLUTION RESPIRATORY (INHALATION)
Status: DISCONTINUED | OUTPATIENT
Start: 2024-01-01 | End: 2024-01-01 | Stop reason: HOSPADM

## 2024-01-01 RX ORDER — LORAZEPAM 2 MG/ML
2 INJECTION INTRAMUSCULAR
Status: DISCONTINUED | OUTPATIENT
Start: 2024-01-01 | End: 2024-01-01

## 2024-01-01 RX ORDER — ATORVASTATIN CALCIUM 40 MG/1
80 TABLET, FILM COATED ORAL EVERY EVENING
Status: DISCONTINUED | OUTPATIENT
Start: 2024-01-01 | End: 2024-01-01

## 2024-01-01 RX ORDER — ONDANSETRON 2 MG/ML
4 INJECTION INTRAMUSCULAR; INTRAVENOUS EVERY 4 HOURS PRN
Status: DISCONTINUED | OUTPATIENT
Start: 2024-01-01 | End: 2024-01-01 | Stop reason: HOSPADM

## 2024-01-01 RX ORDER — HALOPERIDOL 5 MG/ML
5 INJECTION INTRAMUSCULAR ONCE
Status: COMPLETED | OUTPATIENT
Start: 2024-01-01 | End: 2024-01-01

## 2024-01-01 RX ORDER — ONDANSETRON HYDROCHLORIDE 8 MG/1
8 TABLET, FILM COATED ORAL EVERY 8 HOURS PRN
COMMUNITY
Start: 2023-01-01

## 2024-01-01 RX ORDER — TETRACYCLINE HYDROCHLORIDE 500 MG/1
500 CAPSULE ORAL EVERY 6 HOURS
Status: COMPLETED | OUTPATIENT
Start: 2024-01-01 | End: 2024-01-01

## 2024-01-01 RX ORDER — ACETAMINOPHEN 10 MG/ML
1000 INJECTION, SOLUTION INTRAVENOUS ONCE
Status: COMPLETED | OUTPATIENT
Start: 2024-01-01 | End: 2024-01-01

## 2024-01-01 RX ORDER — MAGNESIUM SULFATE HEPTAHYDRATE 40 MG/ML
2 INJECTION, SOLUTION INTRAVENOUS ONCE
Status: COMPLETED | OUTPATIENT
Start: 2024-01-01 | End: 2024-01-01

## 2024-01-01 RX ORDER — FAMOTIDINE 20 MG/1
20 TABLET, FILM COATED ORAL EVERY 12 HOURS
Status: DISCONTINUED | OUTPATIENT
Start: 2024-01-01 | End: 2024-01-01

## 2024-01-01 RX ORDER — BISACODYL 10 MG
10 SUPPOSITORY, RECTAL RECTAL
Status: DISCONTINUED | OUTPATIENT
Start: 2024-01-01 | End: 2024-01-01

## 2024-01-01 RX ORDER — ATORVASTATIN CALCIUM 80 MG/1
80 TABLET, FILM COATED ORAL NIGHTLY
COMMUNITY

## 2024-01-01 RX ORDER — HYDROMORPHONE HYDROCHLORIDE 2 MG/ML
4 INJECTION, SOLUTION INTRAMUSCULAR; INTRAVENOUS; SUBCUTANEOUS
Status: DISCONTINUED | OUTPATIENT
Start: 2024-01-01 | End: 2024-01-01

## 2024-01-01 RX ORDER — POTASSIUM CHLORIDE 14.9 MG/ML
20 INJECTION INTRAVENOUS ONCE
Status: COMPLETED | OUTPATIENT
Start: 2024-01-01 | End: 2024-01-01

## 2024-01-01 RX ORDER — ASPIRIN 81 MG/1
81 TABLET, CHEWABLE ORAL DAILY
Status: DISCONTINUED | OUTPATIENT
Start: 2024-01-01 | End: 2024-01-01

## 2024-01-01 RX ORDER — PROMETHAZINE HYDROCHLORIDE 25 MG/1
25 SUPPOSITORY RECTAL EVERY 4 HOURS PRN
COMMUNITY
Start: 2023-01-01

## 2024-01-01 RX ORDER — POLYETHYLENE GLYCOL 3350 17 G/17G
1 POWDER, FOR SOLUTION ORAL
Status: DISCONTINUED | OUTPATIENT
Start: 2024-01-01 | End: 2024-01-01 | Stop reason: HOSPADM

## 2024-01-01 RX ORDER — SODIUM CHLORIDE, SODIUM LACTATE, POTASSIUM CHLORIDE, CALCIUM CHLORIDE 600; 310; 30; 20 MG/100ML; MG/100ML; MG/100ML; MG/100ML
1000 INJECTION, SOLUTION INTRAVENOUS ONCE
Status: COMPLETED | OUTPATIENT
Start: 2024-01-01 | End: 2024-01-01

## 2024-01-01 RX ORDER — TAMSULOSIN HYDROCHLORIDE 0.4 MG/1
0.4 CAPSULE ORAL
Status: DISCONTINUED | OUTPATIENT
Start: 2024-01-01 | End: 2024-01-01

## 2024-01-01 RX ORDER — GLYCOPYRROLATE 0.2 MG/ML
0.2 INJECTION INTRAMUSCULAR; INTRAVENOUS 3 TIMES DAILY PRN
Status: DISCONTINUED | OUTPATIENT
Start: 2024-01-01 | End: 2024-01-01 | Stop reason: HOSPADM

## 2024-01-01 RX ORDER — TAMSULOSIN HYDROCHLORIDE 0.4 MG/1
0.4 CAPSULE ORAL DAILY
Status: DISCONTINUED | OUTPATIENT
Start: 2024-01-01 | End: 2024-01-01 | Stop reason: HOSPADM

## 2024-01-01 RX ORDER — AMOXICILLIN 250 MG
1 CAPSULE ORAL
Qty: 30 TABLET | Refills: 0 | Status: SHIPPED | OUTPATIENT
Start: 2024-01-01 | End: 2024-01-26

## 2024-01-01 RX ORDER — TETRACYCLINE HYDROCHLORIDE 500 MG/1
500 CAPSULE ORAL 4 TIMES DAILY
Qty: 28 CAPSULE | Refills: 0 | Status: SHIPPED | OUTPATIENT
Start: 2024-01-01 | End: 2024-01-01

## 2024-01-01 RX ORDER — PANTOPRAZOLE SODIUM 40 MG/10ML
40 INJECTION, POWDER, LYOPHILIZED, FOR SOLUTION INTRAVENOUS DAILY
Status: DISCONTINUED | OUTPATIENT
Start: 2024-01-01 | End: 2024-01-01 | Stop reason: HOSPADM

## 2024-01-01 RX ORDER — ONDANSETRON 4 MG/1
4 TABLET, ORALLY DISINTEGRATING ORAL EVERY 4 HOURS PRN
Status: DISCONTINUED | OUTPATIENT
Start: 2024-01-01 | End: 2024-01-01 | Stop reason: HOSPADM

## 2024-01-01 RX ORDER — HYDROMORPHONE HYDROCHLORIDE 2 MG/ML
4 INJECTION, SOLUTION INTRAMUSCULAR; INTRAVENOUS; SUBCUTANEOUS
Status: DISCONTINUED | OUTPATIENT
Start: 2024-01-01 | End: 2024-01-01 | Stop reason: HOSPADM

## 2024-01-01 RX ORDER — GLYCOPYRROLATE 1 MG/1
1 TABLET ORAL 3 TIMES DAILY PRN
Status: DISCONTINUED | OUTPATIENT
Start: 2024-01-01 | End: 2024-01-01 | Stop reason: HOSPADM

## 2024-01-01 RX ORDER — SCOLOPAMINE TRANSDERMAL SYSTEM 1 MG/1
1 PATCH, EXTENDED RELEASE TRANSDERMAL
Status: DISCONTINUED | OUTPATIENT
Start: 2024-01-01 | End: 2024-01-01 | Stop reason: HOSPADM

## 2024-01-01 RX ORDER — OMEPRAZOLE 40 MG/1
40 CAPSULE, DELAYED RELEASE ORAL DAILY
Qty: 30 CAPSULE | Refills: 0 | Status: SHIPPED | OUTPATIENT
Start: 2024-01-01 | End: 2024-01-01

## 2024-01-01 RX ORDER — BISACODYL 10 MG
10 SUPPOSITORY, RECTAL RECTAL
Status: DISCONTINUED | OUTPATIENT
Start: 2024-01-01 | End: 2024-01-01 | Stop reason: HOSPADM

## 2024-01-01 RX ORDER — OXYCODONE HYDROCHLORIDE 5 MG/1
5 TABLET ORAL
Status: DISCONTINUED | OUTPATIENT
Start: 2024-01-01 | End: 2024-01-01 | Stop reason: HOSPADM

## 2024-01-01 RX ADMIN — CEFEPIME 2 G: 2 INJECTION, POWDER, FOR SOLUTION INTRAVENOUS at 06:48

## 2024-01-01 RX ADMIN — TETRACYCLINE HYDROCHLORIDE 500 MG: 500 CAPSULE ORAL at 01:06

## 2024-01-01 RX ADMIN — ATORVASTATIN CALCIUM 80 MG: 40 TABLET, FILM COATED ORAL at 17:18

## 2024-01-01 RX ADMIN — ENOXAPARIN SODIUM 40 MG: 100 INJECTION SUBCUTANEOUS at 18:27

## 2024-01-01 RX ADMIN — TETRACYCLINE HYDROCHLORIDE 500 MG: 500 CAPSULE ORAL at 11:20

## 2024-01-01 RX ADMIN — SODIUM CHLORIDE: 9 INJECTION, SOLUTION INTRAVENOUS at 11:20

## 2024-01-01 RX ADMIN — INSULIN HUMAN 7 UNITS: 100 INJECTION, SOLUTION PARENTERAL at 12:16

## 2024-01-01 RX ADMIN — BISMUTH SUBSALICYLATE 524 MG: 525 LIQUID ORAL at 23:20

## 2024-01-01 RX ADMIN — METOPROLOL TARTRATE 25 MG: 25 TABLET, FILM COATED ORAL at 17:28

## 2024-01-01 RX ADMIN — INSULIN GLARGINE-YFGN 5 UNITS: 100 INJECTION, SOLUTION SUBCUTANEOUS at 17:20

## 2024-01-01 RX ADMIN — ENOXAPARIN SODIUM 40 MG: 100 INJECTION SUBCUTANEOUS at 17:37

## 2024-01-01 RX ADMIN — ASPIRIN 81 MG 81 MG: 81 TABLET ORAL at 05:42

## 2024-01-01 RX ADMIN — HALOPERIDOL LACTATE 5 MG: 5 INJECTION, SOLUTION INTRAMUSCULAR at 14:43

## 2024-01-01 RX ADMIN — ENOXAPARIN SODIUM 40 MG: 100 INJECTION SUBCUTANEOUS at 17:40

## 2024-01-01 RX ADMIN — TETRACYCLINE HYDROCHLORIDE 500 MG: 500 CAPSULE ORAL at 05:00

## 2024-01-01 RX ADMIN — ASPIRIN 81 MG 81 MG: 81 TABLET ORAL at 05:15

## 2024-01-01 RX ADMIN — PANTOPRAZOLE SODIUM 40 MG: 40 INJECTION, POWDER, FOR SOLUTION INTRAVENOUS at 17:23

## 2024-01-01 RX ADMIN — METRONIDAZOLE 500 MG: 500 TABLET ORAL at 21:45

## 2024-01-01 RX ADMIN — METOPROLOL TARTRATE 25 MG: 25 TABLET, FILM COATED ORAL at 05:15

## 2024-01-01 RX ADMIN — INSULIN HUMAN 4 UNITS: 100 INJECTION, SOLUTION PARENTERAL at 01:00

## 2024-01-01 RX ADMIN — PANTOPRAZOLE SODIUM 40 MG: 40 INJECTION, POWDER, FOR SOLUTION INTRAVENOUS at 05:09

## 2024-01-01 RX ADMIN — DOCUSATE SODIUM 50MG AND SENNOSIDES 8.6MG 2 TABLET: 8.6; 5 TABLET, FILM COATED ORAL at 06:55

## 2024-01-01 RX ADMIN — TETRACYCLINE HYDROCHLORIDE 500 MG: 500 CAPSULE ORAL at 17:23

## 2024-01-01 RX ADMIN — LEVETIRACETAM 1000 MG: 500 TABLET, FILM COATED ORAL at 17:12

## 2024-01-01 RX ADMIN — POTASSIUM CHLORIDE 20 MEQ: 14.9 INJECTION, SOLUTION INTRAVENOUS at 22:26

## 2024-01-01 RX ADMIN — TETRACYCLINE HYDROCHLORIDE 500 MG: 500 CAPSULE ORAL at 23:20

## 2024-01-01 RX ADMIN — SENNOSIDES AND DOCUSATE SODIUM 2 TABLET: 50; 8.6 TABLET ORAL at 17:30

## 2024-01-01 RX ADMIN — SODIUM CHLORIDE 5.5 UNITS/HR: 9 INJECTION, SOLUTION INTRAVENOUS at 16:34

## 2024-01-01 RX ADMIN — METOPROLOL TARTRATE 25 MG: 25 TABLET, FILM COATED ORAL at 05:05

## 2024-01-01 RX ADMIN — TETRACYCLINE HYDROCHLORIDE 500 MG: 500 CAPSULE ORAL at 00:03

## 2024-01-01 RX ADMIN — INSULIN GLARGINE-YFGN 5 UNITS: 100 INJECTION, SOLUTION SUBCUTANEOUS at 07:12

## 2024-01-01 RX ADMIN — METOPROLOL TARTRATE 25 MG: 25 TABLET, FILM COATED ORAL at 17:17

## 2024-01-01 RX ADMIN — LEVETIRACETAM 1000 MG: 100 INJECTION, SOLUTION, CONCENTRATE INTRAVENOUS at 05:14

## 2024-01-01 RX ADMIN — METOPROLOL TARTRATE 25 MG: 25 TABLET, FILM COATED ORAL at 05:00

## 2024-01-01 RX ADMIN — LEVETIRACETAM 1000 MG: 500 TABLET, FILM COATED ORAL at 05:09

## 2024-01-01 RX ADMIN — BISMUTH SUBSALICYLATE 524 MG: 525 LIQUID ORAL at 05:42

## 2024-01-01 RX ADMIN — INSULIN HUMAN 4 UNITS: 100 INJECTION, SOLUTION PARENTERAL at 07:08

## 2024-01-01 RX ADMIN — METRONIDAZOLE 500 MG: 500 TABLET ORAL at 17:27

## 2024-01-01 RX ADMIN — BISMUTH SUBSALICYLATE 524 MG: 525 LIQUID ORAL at 00:03

## 2024-01-01 RX ADMIN — CEFEPIME 2 G: 2 INJECTION, POWDER, FOR SOLUTION INTRAVENOUS at 17:31

## 2024-01-01 RX ADMIN — ATORVASTATIN CALCIUM 80 MG: 40 TABLET, FILM COATED ORAL at 17:17

## 2024-01-01 RX ADMIN — TETRACYCLINE HYDROCHLORIDE 500 MG: 500 CAPSULE ORAL at 12:12

## 2024-01-01 RX ADMIN — LANSOPRAZOLE 30 MG: 30 TABLET, ORALLY DISINTEGRATING, DELAYED RELEASE ORAL at 17:37

## 2024-01-01 RX ADMIN — SODIUM CHLORIDE, POTASSIUM CHLORIDE, SODIUM LACTATE AND CALCIUM CHLORIDE 1000 ML: 600; 310; 30; 20 INJECTION, SOLUTION INTRAVENOUS at 14:43

## 2024-01-01 RX ADMIN — LORAZEPAM 4 MG: 2 INJECTION INTRAMUSCULAR; INTRAVENOUS at 14:04

## 2024-01-01 RX ADMIN — LEVETIRACETAM 1000 MG: 500 TABLET, FILM COATED ORAL at 17:20

## 2024-01-01 RX ADMIN — METOPROLOL TARTRATE 25 MG: 25 TABLET, FILM COATED ORAL at 17:38

## 2024-01-01 RX ADMIN — METRONIDAZOLE 500 MG: 500 TABLET ORAL at 14:13

## 2024-01-01 RX ADMIN — SODIUM CHLORIDE 3 UNITS/HR: 9 INJECTION, SOLUTION INTRAVENOUS at 13:45

## 2024-01-01 RX ADMIN — LEVETIRACETAM 1000 MG: 500 TABLET, FILM COATED ORAL at 17:25

## 2024-01-01 RX ADMIN — ASPIRIN 81 MG 81 MG: 81 TABLET ORAL at 05:00

## 2024-01-01 RX ADMIN — ATORVASTATIN CALCIUM 80 MG: 40 TABLET, FILM COATED ORAL at 22:29

## 2024-01-01 RX ADMIN — METRONIDAZOLE 500 MG: 500 TABLET ORAL at 13:40

## 2024-01-01 RX ADMIN — ATORVASTATIN CALCIUM 80 MG: 40 TABLET, FILM COATED ORAL at 17:41

## 2024-01-01 RX ADMIN — METOPROLOL TARTRATE 25 MG: 25 TABLET, FILM COATED ORAL at 06:29

## 2024-01-01 RX ADMIN — ENOXAPARIN SODIUM 40 MG: 100 INJECTION SUBCUTANEOUS at 17:17

## 2024-01-01 RX ADMIN — BISMUTH SUBSALICYLATE 524 MG: 525 LIQUID ORAL at 01:20

## 2024-01-01 RX ADMIN — LEVETIRACETAM 1000 MG: 100 INJECTION, SOLUTION, CONCENTRATE INTRAVENOUS at 17:17

## 2024-01-01 RX ADMIN — ACETAMINOPHEN 650 MG: 325 TABLET, FILM COATED ORAL at 18:02

## 2024-01-01 RX ADMIN — METOPROLOL TARTRATE 25 MG: 25 TABLET, FILM COATED ORAL at 06:55

## 2024-01-01 RX ADMIN — BISMUTH SUBSALICYLATE 524 MG: 525 LIQUID ORAL at 12:12

## 2024-01-01 RX ADMIN — SODIUM CHLORIDE 6 UNITS/HR: 9 INJECTION, SOLUTION INTRAVENOUS at 23:19

## 2024-01-01 RX ADMIN — INSULIN HUMAN 3 UNITS: 100 INJECTION, SOLUTION PARENTERAL at 11:14

## 2024-01-01 RX ADMIN — LEVETIRACETAM 1000 MG: 500 TABLET, FILM COATED ORAL at 05:15

## 2024-01-01 RX ADMIN — METOPROLOL TARTRATE 25 MG: 25 TABLET, FILM COATED ORAL at 05:09

## 2024-01-01 RX ADMIN — BISMUTH SUBSALICYLATE 524 MG: 525 LIQUID ORAL at 05:09

## 2024-01-01 RX ADMIN — TETRACYCLINE HYDROCHLORIDE 500 MG: 500 CAPSULE ORAL at 05:23

## 2024-01-01 RX ADMIN — SENNOSIDES AND DOCUSATE SODIUM 2 TABLET: 50; 8.6 TABLET ORAL at 18:00

## 2024-01-01 RX ADMIN — INSULIN GLARGINE-YFGN 5 UNITS: 100 INJECTION, SOLUTION SUBCUTANEOUS at 19:04

## 2024-01-01 RX ADMIN — METOPROLOL TARTRATE 25 MG: 25 TABLET, FILM COATED ORAL at 05:08

## 2024-01-01 RX ADMIN — ASPIRIN 81 MG 81 MG: 81 TABLET ORAL at 05:08

## 2024-01-01 RX ADMIN — SCOPOLAMINE 1 PATCH: 1.5 PATCH, EXTENDED RELEASE TRANSDERMAL at 14:05

## 2024-01-01 RX ADMIN — METOPROLOL TARTRATE 25 MG: 25 TABLET, FILM COATED ORAL at 17:12

## 2024-01-01 RX ADMIN — METOPROLOL TARTRATE 25 MG: 25 TABLET, FILM COATED ORAL at 05:16

## 2024-01-01 RX ADMIN — INSULIN GLARGINE-YFGN 15 UNITS: 100 INJECTION, SOLUTION SUBCUTANEOUS at 00:09

## 2024-01-01 RX ADMIN — PANTOPRAZOLE SODIUM 40 MG: 40 INJECTION, POWDER, FOR SOLUTION INTRAVENOUS at 17:17

## 2024-01-01 RX ADMIN — TETRACYCLINE HYDROCHLORIDE 500 MG: 500 CAPSULE ORAL at 05:07

## 2024-01-01 RX ADMIN — AMPICILLIN AND SULBACTAM 3 G: 1; 2 INJECTION, POWDER, FOR SOLUTION INTRAMUSCULAR; INTRAVENOUS at 12:28

## 2024-01-01 RX ADMIN — TETRACYCLINE HYDROCHLORIDE 500 MG: 500 CAPSULE ORAL at 12:09

## 2024-01-01 RX ADMIN — SODIUM CHLORIDE: 9 INJECTION, SOLUTION INTRAVENOUS at 16:58

## 2024-01-01 RX ADMIN — PANTOPRAZOLE SODIUM 40 MG: 40 INJECTION, POWDER, FOR SOLUTION INTRAVENOUS at 17:29

## 2024-01-01 RX ADMIN — SODIUM CHLORIDE 11 UNITS/HR: 9 INJECTION, SOLUTION INTRAVENOUS at 10:53

## 2024-01-01 RX ADMIN — SCOPOLAMINE 1 PATCH: 1.5 PATCH, EXTENDED RELEASE TRANSDERMAL at 13:40

## 2024-01-01 RX ADMIN — SODIUM CHLORIDE 5 UNITS/HR: 9 INJECTION, SOLUTION INTRAVENOUS at 09:54

## 2024-01-01 RX ADMIN — SODIUM CHLORIDE, POTASSIUM CHLORIDE, SODIUM LACTATE AND CALCIUM CHLORIDE: 600; 310; 30; 20 INJECTION, SOLUTION INTRAVENOUS at 05:48

## 2024-01-01 RX ADMIN — ATORVASTATIN CALCIUM 80 MG: 40 TABLET, FILM COATED ORAL at 18:02

## 2024-01-01 RX ADMIN — TETRACYCLINE HYDROCHLORIDE 500 MG: 500 CAPSULE ORAL at 17:25

## 2024-01-01 RX ADMIN — TAMSULOSIN HYDROCHLORIDE 0.4 MG: 0.4 CAPSULE ORAL at 06:32

## 2024-01-01 RX ADMIN — ACETAMINOPHEN 1000 MG: 1000 INJECTION INTRAVENOUS at 16:30

## 2024-01-01 RX ADMIN — LEVETIRACETAM 1000 MG: 500 TABLET, FILM COATED ORAL at 05:00

## 2024-01-01 RX ADMIN — IOHEXOL 100 ML: 350 INJECTION, SOLUTION INTRAVENOUS at 15:21

## 2024-01-01 RX ADMIN — OCTREOTIDE ACETATE 50 MCG: 100 INJECTION, SOLUTION INTRAVENOUS; SUBCUTANEOUS at 17:31

## 2024-01-01 RX ADMIN — TETRACYCLINE HYDROCHLORIDE 500 MG: 500 CAPSULE ORAL at 23:57

## 2024-01-01 RX ADMIN — ATORVASTATIN CALCIUM 80 MG: 40 TABLET, FILM COATED ORAL at 17:19

## 2024-01-01 RX ADMIN — OCTREOTIDE ACETATE 50 MCG: 100 INJECTION, SOLUTION INTRAVENOUS; SUBCUTANEOUS at 11:06

## 2024-01-01 RX ADMIN — METOPROLOL TARTRATE 25 MG: 25 TABLET, FILM COATED ORAL at 17:23

## 2024-01-01 RX ADMIN — ASPIRIN 81 MG 81 MG: 81 TABLET ORAL at 05:24

## 2024-01-01 RX ADMIN — AMPICILLIN AND SULBACTAM 3 G: 1; 2 INJECTION, POWDER, FOR SOLUTION INTRAMUSCULAR; INTRAVENOUS at 07:03

## 2024-01-01 RX ADMIN — SODIUM CHLORIDE 6.5 UNITS/HR: 9 INJECTION, SOLUTION INTRAVENOUS at 14:28

## 2024-01-01 RX ADMIN — METOPROLOL TARTRATE 25 MG: 25 TABLET, FILM COATED ORAL at 17:41

## 2024-01-01 RX ADMIN — ENOXAPARIN SODIUM 40 MG: 100 INJECTION SUBCUTANEOUS at 17:24

## 2024-01-01 RX ADMIN — LEVETIRACETAM 1000 MG: 500 TABLET, FILM COATED ORAL at 17:37

## 2024-01-01 RX ADMIN — ACETAMINOPHEN 650 MG: 325 TABLET, FILM COATED ORAL at 03:33

## 2024-01-01 RX ADMIN — METRONIDAZOLE 500 MG: 500 TABLET ORAL at 23:56

## 2024-01-01 RX ADMIN — LANSOPRAZOLE 30 MG: 30 TABLET, ORALLY DISINTEGRATING, DELAYED RELEASE ORAL at 17:12

## 2024-01-01 RX ADMIN — BISMUTH SUBSALICYLATE 524 MG: 525 LIQUID ORAL at 17:17

## 2024-01-01 RX ADMIN — SODIUM CHLORIDE 5.5 UNITS/HR: 9 INJECTION, SOLUTION INTRAVENOUS at 04:58

## 2024-01-01 RX ADMIN — PANTOPRAZOLE SODIUM 40 MG: 40 INJECTION, POWDER, FOR SOLUTION INTRAVENOUS at 05:16

## 2024-01-01 RX ADMIN — BISMUTH SUBSALICYLATE 524 MG: 525 LIQUID ORAL at 05:00

## 2024-01-01 RX ADMIN — INSULIN GLARGINE-YFGN 5 UNITS: 100 INJECTION, SOLUTION SUBCUTANEOUS at 06:00

## 2024-01-01 RX ADMIN — SODIUM CHLORIDE: 9 INJECTION, SOLUTION INTRAVENOUS at 03:10

## 2024-01-01 RX ADMIN — INSULIN HUMAN 4 UNITS: 100 INJECTION, SOLUTION PARENTERAL at 10:52

## 2024-01-01 RX ADMIN — PANTOPRAZOLE SODIUM 40 MG: 40 INJECTION, POWDER, FOR SOLUTION INTRAVENOUS at 06:33

## 2024-01-01 RX ADMIN — INSULIN GLARGINE-YFGN 5 UNITS: 100 INJECTION, SOLUTION SUBCUTANEOUS at 18:27

## 2024-01-01 RX ADMIN — METRONIDAZOLE 500 MG: 500 TABLET ORAL at 05:00

## 2024-01-01 RX ADMIN — ATORVASTATIN CALCIUM 80 MG: 40 TABLET, FILM COATED ORAL at 17:37

## 2024-01-01 RX ADMIN — METRONIDAZOLE 500 MG: 500 TABLET ORAL at 06:32

## 2024-01-01 RX ADMIN — BISMUTH SUBSALICYLATE 524 MG: 525 LIQUID ORAL at 12:09

## 2024-01-01 RX ADMIN — LEVETIRACETAM 1000 MG: 500 TABLET, FILM COATED ORAL at 17:17

## 2024-01-01 RX ADMIN — HYDROMORPHONE HYDROCHLORIDE 4 MG: 2 INJECTION, SOLUTION INTRAMUSCULAR; INTRAVENOUS; SUBCUTANEOUS at 16:58

## 2024-01-01 RX ADMIN — ASPIRIN 81 MG 81 MG: 81 TABLET ORAL at 05:05

## 2024-01-01 RX ADMIN — METRONIDAZOLE 500 MG: 500 TABLET ORAL at 21:04

## 2024-01-01 RX ADMIN — TETRACYCLINE HYDROCHLORIDE 500 MG: 500 CAPSULE ORAL at 17:30

## 2024-01-01 RX ADMIN — ATORVASTATIN CALCIUM 80 MG: 40 TABLET, FILM COATED ORAL at 17:24

## 2024-01-01 RX ADMIN — METOPROLOL TARTRATE 25 MG: 25 TABLET, FILM COATED ORAL at 17:22

## 2024-01-01 RX ADMIN — METOPROLOL TARTRATE 25 MG: 25 TABLET, FILM COATED ORAL at 18:02

## 2024-01-01 RX ADMIN — SODIUM CHLORIDE 8.5 UNITS/HR: 9 INJECTION, SOLUTION INTRAVENOUS at 04:16

## 2024-01-01 RX ADMIN — TETRACYCLINE HYDROCHLORIDE 500 MG: 500 CAPSULE ORAL at 23:56

## 2024-01-01 RX ADMIN — ACETAMINOPHEN 650 MG: 325 TABLET, FILM COATED ORAL at 14:12

## 2024-01-01 RX ADMIN — METRONIDAZOLE 500 MG: 500 TABLET ORAL at 05:42

## 2024-01-01 RX ADMIN — INSULIN HUMAN 7 UNITS: 100 INJECTION, SOLUTION PARENTERAL at 05:11

## 2024-01-01 RX ADMIN — INSULIN GLARGINE-YFGN 5 UNITS: 100 INJECTION, SOLUTION SUBCUTANEOUS at 06:45

## 2024-01-01 RX ADMIN — ASPIRIN 81 MG 81 MG: 81 TABLET ORAL at 05:09

## 2024-01-01 RX ADMIN — AMPICILLIN AND SULBACTAM 3 G: 1; 2 INJECTION, POWDER, FOR SOLUTION INTRAMUSCULAR; INTRAVENOUS at 11:20

## 2024-01-01 RX ADMIN — METOPROLOL TARTRATE 25 MG: 25 TABLET, FILM COATED ORAL at 17:19

## 2024-01-01 RX ADMIN — LANSOPRAZOLE 30 MG: 30 TABLET, ORALLY DISINTEGRATING, DELAYED RELEASE ORAL at 05:14

## 2024-01-01 RX ADMIN — ATORVASTATIN CALCIUM 80 MG: 40 TABLET, FILM COATED ORAL at 17:12

## 2024-01-01 RX ADMIN — LANSOPRAZOLE 30 MG: 30 TABLET, ORALLY DISINTEGRATING, DELAYED RELEASE ORAL at 05:09

## 2024-01-01 RX ADMIN — LANSOPRAZOLE 30 MG: 30 TABLET, ORALLY DISINTEGRATING, DELAYED RELEASE ORAL at 05:08

## 2024-01-01 RX ADMIN — LEVETIRACETAM 1000 MG: 500 TABLET, FILM COATED ORAL at 17:40

## 2024-01-01 RX ADMIN — LEVETIRACETAM 2000 MG: 100 INJECTION, SOLUTION, CONCENTRATE INTRAVENOUS at 13:56

## 2024-01-01 RX ADMIN — LEVETIRACETAM 1000 MG: 500 TABLET, FILM COATED ORAL at 17:23

## 2024-01-01 RX ADMIN — LANSOPRAZOLE 30 MG: 30 TABLET, ORALLY DISINTEGRATING, DELAYED RELEASE ORAL at 17:20

## 2024-01-01 RX ADMIN — ACETAMINOPHEN 650 MG: 325 TABLET, FILM COATED ORAL at 10:50

## 2024-01-01 RX ADMIN — AMPICILLIN AND SULBACTAM 3 G: 1; 2 INJECTION, POWDER, FOR SOLUTION INTRAMUSCULAR; INTRAVENOUS at 05:56

## 2024-01-01 RX ADMIN — PANTOPRAZOLE SODIUM 40 MG: 40 INJECTION, POWDER, FOR SOLUTION INTRAVENOUS at 05:04

## 2024-01-01 RX ADMIN — LABETALOL HYDROCHLORIDE 10 MG: 5 INJECTION, SOLUTION INTRAVENOUS at 16:12

## 2024-01-01 RX ADMIN — SCOPOLAMINE 1 PATCH: 1.5 PATCH, EXTENDED RELEASE TRANSDERMAL at 13:50

## 2024-01-01 RX ADMIN — INSULIN HUMAN 5 UNITS: 100 INJECTION, SOLUTION PARENTERAL at 16:51

## 2024-01-01 RX ADMIN — TAMSULOSIN HYDROCHLORIDE 0.4 MG: 0.4 CAPSULE ORAL at 06:56

## 2024-01-01 RX ADMIN — BISMUTH SUBSALICYLATE 524 MG: 525 LIQUID ORAL at 05:24

## 2024-01-01 RX ADMIN — PANTOPRAZOLE SODIUM 40 MG: 40 INJECTION, POWDER, FOR SOLUTION INTRAVENOUS at 05:15

## 2024-01-01 RX ADMIN — LANSOPRAZOLE 30 MG: 30 TABLET, ORALLY DISINTEGRATING, DELAYED RELEASE ORAL at 17:42

## 2024-01-01 RX ADMIN — BISMUTH SUBSALICYLATE 524 MG: 525 LIQUID ORAL at 17:23

## 2024-01-01 RX ADMIN — METRONIDAZOLE 500 MG: 500 TABLET ORAL at 05:24

## 2024-01-01 RX ADMIN — TETRACYCLINE HYDROCHLORIDE 500 MG: 500 CAPSULE ORAL at 05:10

## 2024-01-01 RX ADMIN — BISMUTH SUBSALICYLATE 524 MG: 525 LIQUID ORAL at 05:08

## 2024-01-01 RX ADMIN — PANTOPRAZOLE SODIUM 40 MG: 40 INJECTION, POWDER, FOR SOLUTION INTRAVENOUS at 17:24

## 2024-01-01 RX ADMIN — ASPIRIN 81 MG 81 MG: 81 TABLET ORAL at 05:50

## 2024-01-01 RX ADMIN — AMPICILLIN AND SULBACTAM 3 G: 1; 2 INJECTION, POWDER, FOR SOLUTION INTRAMUSCULAR; INTRAVENOUS at 00:20

## 2024-01-01 RX ADMIN — ASPIRIN 81 MG 81 MG: 81 TABLET ORAL at 05:14

## 2024-01-01 RX ADMIN — GADOTERIDOL 14 ML: 279.3 INJECTION, SOLUTION INTRAVENOUS at 01:26

## 2024-01-01 RX ADMIN — METRONIDAZOLE 500 MG: 500 TABLET ORAL at 21:26

## 2024-01-01 RX ADMIN — SODIUM CHLORIDE, POTASSIUM CHLORIDE, SODIUM LACTATE AND CALCIUM CHLORIDE: 600; 310; 30; 20 INJECTION, SOLUTION INTRAVENOUS at 20:09

## 2024-01-01 RX ADMIN — OCTREOTIDE ACETATE 50 MCG: 100 INJECTION, SOLUTION INTRAVENOUS; SUBCUTANEOUS at 05:24

## 2024-01-01 RX ADMIN — PANTOPRAZOLE SODIUM 40 MG: 40 INJECTION, POWDER, FOR SOLUTION INTRAVENOUS at 05:57

## 2024-01-01 RX ADMIN — LEVETIRACETAM 1000 MG: 500 TABLET, FILM COATED ORAL at 18:02

## 2024-01-01 RX ADMIN — TETRACYCLINE HYDROCHLORIDE 500 MG: 500 CAPSULE ORAL at 12:11

## 2024-01-01 RX ADMIN — METRONIDAZOLE 500 MG: 500 TABLET ORAL at 13:28

## 2024-01-01 RX ADMIN — SODIUM CHLORIDE 8.5 UNITS/HR: 9 INJECTION, SOLUTION INTRAVENOUS at 15:09

## 2024-01-01 RX ADMIN — BISMUTH SUBSALICYLATE 524 MG: 525 LIQUID ORAL at 11:54

## 2024-01-01 RX ADMIN — METOPROLOL TARTRATE 25 MG: 25 TABLET, FILM COATED ORAL at 05:50

## 2024-01-01 RX ADMIN — METRONIDAZOLE 500 MG: 500 TABLET ORAL at 13:55

## 2024-01-01 RX ADMIN — IOHEXOL 25 ML: 240 INJECTION, SOLUTION INTRATHECAL; INTRAVASCULAR; INTRAVENOUS; ORAL at 15:45

## 2024-01-01 RX ADMIN — BISMUTH SUBSALICYLATE 524 MG: 525 LIQUID ORAL at 23:56

## 2024-01-01 RX ADMIN — POTASSIUM BICARBONATE 25 MEQ: 978 TABLET, EFFERVESCENT ORAL at 08:10

## 2024-01-01 RX ADMIN — OCTREOTIDE ACETATE 50 MCG: 100 INJECTION, SOLUTION INTRAVENOUS; SUBCUTANEOUS at 00:00

## 2024-01-01 RX ADMIN — ACETAMINOPHEN 650 MG: 325 TABLET ORAL at 23:23

## 2024-01-01 RX ADMIN — HYDROMORPHONE HYDROCHLORIDE 4 MG: 2 INJECTION, SOLUTION INTRAMUSCULAR; INTRAVENOUS; SUBCUTANEOUS at 14:35

## 2024-01-01 RX ADMIN — ACETAMINOPHEN 650 MG: 325 TABLET, FILM COATED ORAL at 18:20

## 2024-01-01 RX ADMIN — ACETAMINOPHEN 650 MG: 325 TABLET, FILM COATED ORAL at 02:00

## 2024-01-01 RX ADMIN — ATROPINE SULFATE 2 DROP: 10 SOLUTION/ DROPS OPHTHALMIC at 13:59

## 2024-01-01 RX ADMIN — TETRACYCLINE HYDROCHLORIDE 500 MG: 500 CAPSULE ORAL at 17:16

## 2024-01-01 RX ADMIN — METOPROLOL TARTRATE 25 MG: 25 TABLET, FILM COATED ORAL at 17:24

## 2024-01-01 RX ADMIN — PANTOPRAZOLE SODIUM 40 MG: 40 INJECTION, POWDER, FOR SOLUTION INTRAVENOUS at 05:42

## 2024-01-01 RX ADMIN — SODIUM CHLORIDE 7 UNITS/HR: 9 INJECTION, SOLUTION INTRAVENOUS at 03:06

## 2024-01-01 RX ADMIN — SODIUM CHLORIDE, POTASSIUM CHLORIDE, SODIUM LACTATE AND CALCIUM CHLORIDE: 600; 310; 30; 20 INJECTION, SOLUTION INTRAVENOUS at 03:15

## 2024-01-01 RX ADMIN — METOPROLOL TARTRATE 25 MG: 25 TABLET, FILM COATED ORAL at 18:50

## 2024-01-01 RX ADMIN — ENOXAPARIN SODIUM 40 MG: 100 INJECTION SUBCUTANEOUS at 17:16

## 2024-01-01 RX ADMIN — ENOXAPARIN SODIUM 40 MG: 100 INJECTION SUBCUTANEOUS at 17:23

## 2024-01-01 RX ADMIN — METRONIDAZOLE 500 MG: 500 TABLET ORAL at 22:30

## 2024-01-01 RX ADMIN — AMPICILLIN AND SULBACTAM 3 G: 1; 2 INJECTION, POWDER, FOR SOLUTION INTRAMUSCULAR; INTRAVENOUS at 19:35

## 2024-01-01 RX ADMIN — SODIUM CHLORIDE: 9 INJECTION, SOLUTION INTRAVENOUS at 13:26

## 2024-01-01 RX ADMIN — LEVETIRACETAM 1000 MG: 100 INJECTION, SOLUTION, CONCENTRATE INTRAVENOUS at 05:24

## 2024-01-01 RX ADMIN — MAGNESIUM SULFATE HEPTAHYDRATE 2 G: 2 INJECTION, SOLUTION INTRAVENOUS at 10:52

## 2024-01-01 RX ADMIN — TAMSULOSIN HYDROCHLORIDE 0.4 MG: 0.4 CAPSULE ORAL at 05:57

## 2024-01-01 RX ADMIN — AMPICILLIN AND SULBACTAM 3 G: 1; 2 INJECTION, POWDER, FOR SOLUTION INTRAMUSCULAR; INTRAVENOUS at 17:28

## 2024-01-01 RX ADMIN — LEVETIRACETAM 1000 MG: 100 INJECTION, SOLUTION, CONCENTRATE INTRAVENOUS at 20:54

## 2024-01-01 RX ADMIN — INSULIN HUMAN 4 UNITS: 100 INJECTION, SOLUTION PARENTERAL at 20:15

## 2024-01-01 RX ADMIN — TETRACYCLINE HYDROCHLORIDE 500 MG: 500 CAPSULE ORAL at 17:17

## 2024-01-01 RX ADMIN — INSULIN HUMAN 4 UNITS: 100 INJECTION, SOLUTION PARENTERAL at 23:57

## 2024-01-01 RX ADMIN — BISMUTH SUBSALICYLATE 524 MG: 525 LIQUID ORAL at 18:00

## 2024-01-01 RX ADMIN — BISMUTH SUBSALICYLATE 524 MG: 525 LIQUID ORAL at 12:11

## 2024-01-01 RX ADMIN — LANSOPRAZOLE 30 MG: 30 TABLET, ORALLY DISINTEGRATING, DELAYED RELEASE ORAL at 18:02

## 2024-01-01 RX ADMIN — ATORVASTATIN CALCIUM 80 MG: 40 TABLET, FILM COATED ORAL at 17:30

## 2024-01-01 RX ADMIN — TETRACYCLINE HYDROCHLORIDE 500 MG: 500 CAPSULE ORAL at 11:54

## 2024-01-01 RX ADMIN — TETRACYCLINE HYDROCHLORIDE 500 MG: 500 CAPSULE ORAL at 05:42

## 2024-01-01 RX ADMIN — SENNOSIDES AND DOCUSATE SODIUM 2 TABLET: 50; 8.6 TABLET ORAL at 05:23

## 2024-01-01 RX ADMIN — SODIUM CHLORIDE 6.5 UNITS/HR: 9 INJECTION, SOLUTION INTRAVENOUS at 06:19

## 2024-01-01 RX ADMIN — SODIUM CHLORIDE, POTASSIUM CHLORIDE, SODIUM LACTATE AND CALCIUM CHLORIDE: 600; 310; 30; 20 INJECTION, SOLUTION INTRAVENOUS at 16:22

## 2024-01-01 RX ADMIN — ACETAMINOPHEN 650 MG: 325 TABLET, FILM COATED ORAL at 11:54

## 2024-01-01 RX ADMIN — TAMSULOSIN HYDROCHLORIDE 0.4 MG: 0.4 CAPSULE ORAL at 18:49

## 2024-01-01 RX ADMIN — METOPROLOL TARTRATE 25 MG: 25 TABLET, FILM COATED ORAL at 17:20

## 2024-01-01 RX ADMIN — ENOXAPARIN SODIUM 40 MG: 100 INJECTION SUBCUTANEOUS at 17:12

## 2024-01-01 RX ADMIN — HYDROMORPHONE HYDROCHLORIDE 2 MG: 2 INJECTION, SOLUTION INTRAMUSCULAR; INTRAVENOUS; SUBCUTANEOUS at 13:34

## 2024-01-01 RX ADMIN — SODIUM CHLORIDE 6 UNITS/HR: 9 INJECTION, SOLUTION INTRAVENOUS at 22:12

## 2024-01-01 RX ADMIN — BISMUTH SUBSALICYLATE 524 MG: 525 LIQUID ORAL at 12:38

## 2024-01-01 RX ADMIN — PANTOPRAZOLE SODIUM 80 MG: 40 INJECTION, POWDER, FOR SOLUTION INTRAVENOUS at 16:24

## 2024-01-01 RX ADMIN — BISMUTH SUBSALICYLATE 524 MG: 525 LIQUID ORAL at 17:25

## 2024-01-01 RX ADMIN — PANTOPRAZOLE SODIUM 40 MG: 40 INJECTION, POWDER, FOR SOLUTION INTRAVENOUS at 06:56

## 2024-01-01 RX ADMIN — INSULIN HUMAN 3 UNITS: 100 INJECTION, SOLUTION PARENTERAL at 17:41

## 2024-01-01 RX ADMIN — INSULIN GLARGINE-YFGN 10 UNITS: 100 INJECTION, SOLUTION SUBCUTANEOUS at 10:13

## 2024-01-01 RX ADMIN — METRONIDAZOLE 500 MG: 500 TABLET ORAL at 05:07

## 2024-01-01 RX ADMIN — ACETAMINOPHEN 650 MG: 325 TABLET, FILM COATED ORAL at 00:23

## 2024-01-01 RX ADMIN — LANSOPRAZOLE 30 MG: 30 TABLET, ORALLY DISINTEGRATING, DELAYED RELEASE ORAL at 05:50

## 2024-01-01 RX ADMIN — SODIUM CHLORIDE 4 UNITS/HR: 9 INJECTION, SOLUTION INTRAVENOUS at 11:00

## 2024-01-01 RX ADMIN — SODIUM CHLORIDE 6.5 UNITS/HR: 9 INJECTION, SOLUTION INTRAVENOUS at 20:23

## 2024-01-01 RX ADMIN — SENNOSIDES AND DOCUSATE SODIUM 2 TABLET: 50; 8.6 TABLET ORAL at 05:42

## 2024-01-01 RX ADMIN — PANTOPRAZOLE SODIUM 40 MG: 40 INJECTION, POWDER, FOR SOLUTION INTRAVENOUS at 05:24

## 2024-01-01 RX ADMIN — LEVETIRACETAM 1000 MG: 500 TABLET, FILM COATED ORAL at 05:05

## 2024-01-01 RX ADMIN — LEVETIRACETAM 1000 MG: 500 TABLET, FILM COATED ORAL at 05:51

## 2024-01-01 RX ADMIN — ENOXAPARIN SODIUM 40 MG: 100 INJECTION SUBCUTANEOUS at 17:19

## 2024-01-01 RX ADMIN — LEVETIRACETAM 1000 MG: 500 TABLET, FILM COATED ORAL at 05:08

## 2024-01-01 RX ADMIN — BISMUTH SUBSALICYLATE 524 MG: 525 LIQUID ORAL at 23:57

## 2024-01-01 RX ADMIN — ENOXAPARIN SODIUM 40 MG: 100 INJECTION SUBCUTANEOUS at 17:22

## 2024-01-01 RX ADMIN — PANTOPRAZOLE SODIUM 40 MG: 40 INJECTION, POWDER, FOR SOLUTION INTRAVENOUS at 21:57

## 2024-01-01 RX ADMIN — AMPICILLIN AND SULBACTAM 3 G: 1; 2 INJECTION, POWDER, FOR SOLUTION INTRAMUSCULAR; INTRAVENOUS at 23:49

## 2024-01-01 RX ADMIN — METRONIDAZOLE 500 MG: 500 TABLET ORAL at 05:09

## 2024-01-01 RX ADMIN — ATORVASTATIN CALCIUM 80 MG: 40 TABLET, FILM COATED ORAL at 17:23

## 2024-01-01 RX ADMIN — ENOXAPARIN SODIUM 40 MG: 100 INJECTION SUBCUTANEOUS at 18:02

## 2024-01-01 RX ADMIN — METRONIDAZOLE 500 MG: 500 TABLET ORAL at 13:49

## 2024-01-01 RX ADMIN — ACETAMINOPHEN 650 MG: 325 TABLET, FILM COATED ORAL at 05:50

## 2024-01-01 ASSESSMENT — PAIN DESCRIPTION - PAIN TYPE
TYPE: ACUTE PAIN

## 2024-01-01 ASSESSMENT — ENCOUNTER SYMPTOMS
EYES NEGATIVE: 1
NEUROLOGICAL NEGATIVE: 1
EYE REDNESS: 0
COUGH: 0
NAUSEA: 1
NEUROLOGICAL NEGATIVE: 1
CARDIOVASCULAR NEGATIVE: 1
MYALGIAS: 1
SHORTNESS OF BREATH: 0
BRUISES/BLEEDS EASILY: 0
FLANK PAIN: 0
RESPIRATORY NEGATIVE: 1
MYALGIAS: 0
EYES NEGATIVE: 1
RESPIRATORY NEGATIVE: 1
STRIDOR: 0
NAUSEA: 1
PSYCHIATRIC NEGATIVE: 1
NAUSEA: 1
ABDOMINAL PAIN: 1
FEVER: 0
MYALGIAS: 1
WEIGHT LOSS: 1
PSYCHIATRIC NEGATIVE: 1
CHILLS: 0
NERVOUS/ANXIOUS: 0
CARDIOVASCULAR NEGATIVE: 1
VOMITING: 1
FOCAL WEAKNESS: 0
EYE DISCHARGE: 0

## 2024-01-01 ASSESSMENT — COGNITIVE AND FUNCTIONAL STATUS - GENERAL
CLIMB 3 TO 5 STEPS WITH RAILING: A LITTLE
WALKING IN HOSPITAL ROOM: A LITTLE
SUGGESTED CMS G CODE MODIFIER MOBILITY: CN
STANDING UP FROM CHAIR USING ARMS: TOTAL
HELP NEEDED FOR BATHING: TOTAL
CLIMB 3 TO 5 STEPS WITH RAILING: TOTAL
MOBILITY SCORE: 6
TOILETING: TOTAL
SUGGESTED CMS G CODE MODIFIER DAILY ACTIVITY: CH
MOVING TO AND FROM BED TO CHAIR: UNABLE
MOVING FROM LYING ON BACK TO SITTING ON SIDE OF FLAT BED: UNABLE
DAILY ACTIVITIY SCORE: 6
WALKING IN HOSPITAL ROOM: TOTAL
MOBILITY SCORE: 22
PERSONAL GROOMING: TOTAL
SUGGESTED CMS G CODE MODIFIER MOBILITY: CJ
EATING MEALS: TOTAL
DAILY ACTIVITIY SCORE: 24
DRESSING REGULAR LOWER BODY CLOTHING: TOTAL
TURNING FROM BACK TO SIDE WHILE IN FLAT BAD: UNABLE
DRESSING REGULAR UPPER BODY CLOTHING: TOTAL
SUGGESTED CMS G CODE MODIFIER DAILY ACTIVITY: CN

## 2024-01-01 ASSESSMENT — PATIENT HEALTH QUESTIONNAIRE - PHQ9
SUM OF ALL RESPONSES TO PHQ9 QUESTIONS 1 AND 2: 0
1. LITTLE INTEREST OR PLEASURE IN DOING THINGS: NOT AT ALL
2. FEELING DOWN, DEPRESSED, IRRITABLE, OR HOPELESS: NOT AT ALL
1. LITTLE INTEREST OR PLEASURE IN DOING THINGS: NOT AT ALL
2. FEELING DOWN, DEPRESSED, IRRITABLE, OR HOPELESS: NOT AT ALL
1. LITTLE INTEREST OR PLEASURE IN DOING THINGS: NOT AT ALL
SUM OF ALL RESPONSES TO PHQ9 QUESTIONS 1 AND 2: 0
2. FEELING DOWN, DEPRESSED, IRRITABLE, OR HOPELESS: NOT AT ALL
SUM OF ALL RESPONSES TO PHQ9 QUESTIONS 1 AND 2: 0
1. LITTLE INTEREST OR PLEASURE IN DOING THINGS: NOT AT ALL
SUM OF ALL RESPONSES TO PHQ9 QUESTIONS 1 AND 2: 0
2. FEELING DOWN, DEPRESSED, IRRITABLE, OR HOPELESS: NOT AT ALL

## 2024-01-01 ASSESSMENT — LIFESTYLE VARIABLES
AVERAGE NUMBER OF DAYS PER WEEK YOU HAVE A DRINK CONTAINING ALCOHOL: 1
ON A TYPICAL DAY WHEN YOU DRINK ALCOHOL HOW MANY DRINKS DO YOU HAVE: 0
TOTAL SCORE: 0
TOTAL SCORE: 0
CONSUMPTION TOTAL: NEGATIVE
TOTAL SCORE: 0
HAVE PEOPLE ANNOYED YOU BY CRITICIZING YOUR DRINKING: NO
ALCOHOL_USE: YES
EVER HAD A DRINK FIRST THING IN THE MORNING TO STEADY YOUR NERVES TO GET RID OF A HANGOVER: NO
EVER FELT BAD OR GUILTY ABOUT YOUR DRINKING: NO
HAVE YOU EVER FELT YOU SHOULD CUT DOWN ON YOUR DRINKING: NO
HOW MANY TIMES IN THE PAST YEAR HAVE YOU HAD 5 OR MORE DRINKS IN A DAY: 0

## 2024-01-01 ASSESSMENT — FIBROSIS 4 INDEX
FIB4 SCORE: 1.84
FIB4 SCORE: 1.63
FIB4 SCORE: 0.7
FIB4 SCORE: 3.11
FIB4 SCORE: 3.11
FIB4 SCORE: 1.92
FIB4 SCORE: 0.78
FIB4 SCORE: 3.11
FIB4 SCORE: 0.78
FIB4 SCORE: 1.92
FIB4 SCORE: 3.11
FIB4 SCORE: 1.73

## 2024-01-01 ASSESSMENT — PAIN DESCRIPTION - DESCRIPTORS: DESCRIPTORS: CRAMPING

## 2024-01-02 PROBLEM — R11.10 INTRACTABLE VOMITING: Status: ACTIVE | Noted: 2024-01-01

## 2024-01-02 PROBLEM — C76.0 HEAD AND NECK CANCER (HCC): Status: ACTIVE | Noted: 2024-01-01

## 2024-01-02 PROBLEM — K29.80 DUODENITIS: Status: ACTIVE | Noted: 2024-01-01

## 2024-01-02 PROBLEM — R50.9 FEVER: Status: ACTIVE | Noted: 2024-01-01

## 2024-01-02 NOTE — ED PROVIDER NOTES
ER Provider Note    Scribed for Dilip Valdes M.d. by Edgar Love. 1/2/2024  2:22 PM    Primary Care Provider: Mayur Dorsey M.D.    CHIEF COMPLAINT  Chief Complaint   Patient presents with    Chest Pain     Epigastric area     EXTERNAL RECORDS REVIEWED  Outpatient Notes The patient is followed by Cardiology for Coronary Artery Disease status post CABG and was last seen on 12/7/23.    HPI/ROS  LIMITATION TO HISTORY   Select: : None    OUTSIDE HISTORIAN(S):  Significant other at bedside provides information on what patient was administered today while at his Oncologists office    Pradip Baez is a 73 y.o. male who presents to the ED for evaluation of vomiting and upper abdominal pain and bloating, onset three days ago. The patient has a history of head and neck cancer, as well as diabetes. He is currently following with Dr. German (Oncology) for his cancer. He presented to his office today for evaluation and the patient was found to be hyperglycemia with a blood sugar of 441. He was given 20 units of Novolin, and was also given 12 mg of Zofran and Fluids due to his nausea. His cancer treatment was placed on hold at this time and he was referred to present to the ED for further evaluation. While in the ED the patient reports associated symptoms of nausea and weight loss. He notes he has lost 9 lbs in the last 3 days. He denies any fever, dysuria, or blood in his vomit. He also denies any surgery or antibiotics within the past 30 days. He was started on chemotherapy last Wednesday (12/27/23) and received his most recent treatment on Friday (12/29/23). He is not sure what medication is used during his chemotherapy. He normally takes Humalog and Toujeo for his diabetes. He has no abdominal surgical history, however, he has had a prior heart bypass.     PAST MEDICAL HISTORY  Past Medical History:   Diagnosis Date    CAD (coronary artery disease)     Diabetes     oral meds, Dr Cornelius    Diabetes ([facility])      F/u of acute inferolateral myocardial infarction (HCC) 2/7/2012    Hyperlipidemia     Hypertension     Myocardial infarct (HCC) 1/19/2012       SURGICAL HISTORY  Past Surgical History:   Procedure Laterality Date    MULTIPLE CORONARY ARTERY BYPASS ENDO VEIN HARVEST  10/6/2015    Procedure: MULTIPLE CORONARY ARTERY BYPASS ENDO VEIN HARVEST X2;  Surgeon: Malcolm Lynn M.D.;  Location: SURGERY [facility];  Service:     RECOVERY  10/2/2015    Procedure: CATH LAB [facility] WITH POSSIBLE WIEDENBECK;  Surgeon: [facility];  Location: SURGERY PRE-POST PROC UNIT [facility];  Service:     OTHER CARDIAC SURGERY  1/19/2012    stent    STENT PLACEMENT         FAMILY HISTORY  Family History   Problem Relation Age of Onset    Diabetes Mother     Cancer Mother     Heart Disease Father         pacemaker    Diabetes Father     Heart Disease Sister         hole in heart       SOCIAL HISTORY   reports that he has never smoked. He has never used smokeless tobacco. He reports current alcohol use. He reports that he does not use drugs.    CURRENT MEDICATIONS  Previous Medications    ASPIRIN EC 81 MG EC TABLET    Take 1 Tab by mouth every day.    ATORVASTATIN (LIPITOR) 80 MG TABLET    Take 1 Tablet by mouth every evening.    BD PEN NEEDLE APRIL U/F        CYANOCOBALAMIN (VITAMIN B-12 PO)    Take 1 Tab by mouth every day. 3000 IU SL    FREESTYLE LITE STRIP    TEST DAILY DIAGNOSIS E11.65 TYPE 2 DIABETES MELLITUS, NOT ON INSULIN.    GLIMEPIRIDE (AMARYL) 4 MG TAB    Take 4 mg by mouth 2 times a day.    HUMALOG KWIKPEN 100 UNIT/ML SOLUTION PEN-INJECTOR INJECTION PEN    10 Units every day.    METOPROLOL TARTRATE (LOPRESSOR) 25 MG TAB    Take 1 Tablet by mouth 2 times a day.    OZEMPIC, 0.25 OR 0.5 MG/DOSE, 2 MG/1.5ML SOLUTION PEN-INJECTOR    INJECT 0.5 MG SUBCUTANEOUSLY EVERY WEEK    TAMSULOSIN (FLOMAX) 0.4 MG CAPSULE    Take 0.4 mg by mouth ONE-HALF HOUR AFTER BREAKFAST.    TOUJEO SOLOSTAR 300 UNIT/ML SOLUTION PEN-INJECTOR    45 Units  "every day.       ALLERGIES  Patient has no known allergies.    PHYSICAL EXAM  BP (!) 168/88   Pulse 87   Temp 36.5 °C (97.7 °F) (Temporal)   Resp 16   Ht 1.727 m (5' 8\")   Wt 77.9 kg (171 lb 11.8 oz)   SpO2 96%   BMI 26.11 kg/m²   Constitutional: Alert in no apparent distress.  HENT: No signs of trauma, Bilateral external ears normal, Nose normal. Uvula midline.   Eyes: Pupils are equal and reactive, Conjunctiva normal, Non-icteric.   Neck: Normal range of motion, No tenderness, Supple, No stridor.   Lymphatic: Left submandibular posterior lymphadenopathy.   Cardiovascular: Frequent PVCs, regular rate, no murmurs.   Thorax & Lungs: Normal breath sounds, No respiratory distress, No wheezing, No chest tenderness.   Abdomen: Bowel sounds normal, Soft, Diffuse upper abdominal tenderness to palpation, No peritoneal signs, No masses, No pulsatile masses.   Skin: Warm, Dry, No erythema, No rash.   Back: No bony tenderness, No CVA tenderness.   Extremities: Intact distal pulses, No edema, No tenderness, No cyanosis.  Musculoskeletal: Good range of motion in all major joints. No tenderness to palpation or major deformities noted.   Neurologic: Alert , Normal motor function, Normal sensory function, No focal deficits noted.   Psychiatric: Affect normal, Judgment normal, Mood normal.        DIAGNOSTIC STUDIES    Labs:   Labs Reviewed   CBC WITH DIFFERENTIAL - Abnormal; Notable for the following components:       Result Value    Hematocrit 41.6 (*)     Platelet Count 153 (*)     Neutrophils-Polys 76.00 (*)     Lymphocytes 21.00 (*)     All other components within normal limits    Narrative:     Biotin intake of greater than 5 mg per day may interfere with  troponin levels, causing false low values.   COMP METABOLIC PANEL - Abnormal; Notable for the following components:    Glucose 271 (*)     Bun 38 (*)     AST(SGOT) 11 (*)     All other components within normal limits    Narrative:     Biotin intake of greater than 5 mg " per day may interfere with  troponin levels, causing false low values.   TROPONIN - Abnormal; Notable for the following components:    Troponin T 20 (*)     All other components within normal limits    Narrative:     Order was entered on previous order incorrectly  14:46  01/02/2024   VENOUS BLOOD GAS - Abnormal; Notable for the following components:    Venous Bg Pco2 38.3 (*)     Venous Bg Pco2 Temp Corrected 37.5 (*)     Venous Bg Hco3 21 (*)     All other components within normal limits    Narrative:     Order was entered on previous order incorrectly  14:46  01/02/2024   POCT GLUCOSE DEVICE RESULTS - Abnormal; Notable for the following components:    POC Glucose, Blood 271 (*)     All other components within normal limits   POCT GLUCOSE DEVICE RESULTS - Abnormal; Notable for the following components:    POC Glucose, Blood 320 (*)     All other components within normal limits   POCT GLUCOSE DEVICE RESULTS - Abnormal; Notable for the following components:    POC Glucose, Blood 262 (*)     All other components within normal limits   TROPONIN    Narrative:     Biotin intake of greater than 5 mg per day may interfere with  troponin levels, causing false low values.   ESTIMATED GFR    Narrative:     Biotin intake of greater than 5 mg per day may interfere with  troponin levels, causing false low values.   DIFFERENTIAL MANUAL    Narrative:     Biotin intake of greater than 5 mg per day may interfere with  troponin levels, causing false low values.   PLATELET ESTIMATE    Narrative:     Biotin intake of greater than 5 mg per day may interfere with  troponin levels, causing false low values.   MORPHOLOGY    Narrative:     Biotin intake of greater than 5 mg per day may interfere with  troponin levels, causing false low values.   LIPASE    Narrative:     Biotin intake of greater than 5 mg per day may interfere with  troponin levels, causing false low values.   LACTIC ACID   COV-2, FLU A/B, AND RSV BY PCR ([facility])    PROCALCITONIN   VENOUS BLOOD GAS   H.PYLORI STOOL ANTIGEN   BLOOD CULTURE   BLOOD CULTURE        EKG:   I have independently interpreted this EKG   Report   Date Value Ref Range Status   2024       [facility] Emergency Dept.    Test Date:  2024  Pt Name:    EARLE ALBERT                 Department: [facility]  MRN:        [id number]                      Room:  Gender:     Male                         Technician: [id number]  :        [id number]                   Requested By:ER TRIAGE PROTOCOL  Order #:    [id number]                    Reading MD:    Measurements  Intervals                                Axis  Rate:       85                           P:          68  KY:         145                          QRS:        -67  QRSD:       94                           T:          68  QT:         408  QTc:        486    Interpretive Statements  Sinus rhythm  Probable left atrial enlargement  Inferior infarct, old  Compared to ECG 2023 13:17:15  No significant changes                 Radiology:   The attending emergency physician has independently interpreted the diagnostic imaging associated with this visit and am waiting the final reading from the radiologist.   Preliminary interpretation is a follows: no SBO  Radiologist interpretation:     CT-ABDOMEN-PELVIS WITH   Final Result      1.  Diffuse thickening and edema of the duodenal bulb extending into the second and third portions. This likely represents duodenitis/peptic ulcer disease.      2.  Fatty liver.      3.  Sigmoid diverticulosis.      4.  Vascular calcification.      DX-CHEST-PORTABLE (1 VIEW)   Final Result      No evidence of acute cardiopulmonary process.           COURSE & MEDICAL DECISION MAKING     ED Observation Status? No; Patient does not meet criteria for ED Observation.     INITIAL ASSESSMENT, COURSE AND PLAN  Care Narrative: 73 y.o. M p/w CC of vomiting with associated abdominal pain and bloating onset three days ago.      2:22 PM - Patient seen and examined at bedside. Discussed plan of care, including diagnostic studies and medication for the patient's persistent nausea. Patient agrees to the plan of care. The patient will be medicated with LR bolus and Haldol injection 5 mg. Ordered for EKG, CT-Abdomen pelvis with, DX-Chest, Venous blood gas, Lipase, Lactic acid, CBC with differential, CMP, and Troponin to evaluate his symptoms.      #abdominal pain    CT scan of abdomen ordered.  No RLQ pain, TTP or fever to suggest appendicitis  No LLQ pain or TTP or fever to suggest diverticulitis  No pain at of proportion to suggest mesenteric ischemia or e/o elevation in lactic  No e/o rash or zoster  No elevation in lipase to suggest pancreatitis  ECG unremarkable and no chest pain to suggest intrathoracic etiology of abd pain  Heart score 4, mild elevation in trop likely 2/2 ongoing vomiting. Would consider trending as inpt      Intravenous fluids administered for vomiting.  Patient not appropriate for oral rehydration, as surgical process needs to be ruled out before trial of oral rehydration.   On repeat evaluation, pt. w/ positive fluid response.     3:34 PM The patient will be medicated with insulin regular injection.     4:01 PM I discussed the patient's case and the above findings with Dr. Cotton (Hospitalist) who agrees to evaluate the patient for hospitalization.     4:17 PM I discussed the patient's case and the above findings with the physician on call for Dr. German (Oncology) who agrees to the plan of care and would like the patient also tested for viral illness. Ordered SARS-CoV-2, Flu A/B, and RSV.        DISPOSITION AND DISCUSSIONS  I have discussed management of the patient with the following physicians and TATIANA's:    Dr. Cotton (Hospitalist)  Physician on call for Dr. German (Oncology)    Discussion of management with other [facility] or appropriate source(s): None         Barriers to care at this time, including but not  limited to:  None known at this time .     Decision tools and prescription drugs considered including, but not limited to: Antibiotics d/w oncology and admitting MD but decision to hold at this time .    DISPOSITION:  Patient will be hospitalized by Dr. Cotton in guarded condition.     FINAL DIAGNOSIS  1. Nausea and vomiting, unspecified vomiting type        I, Edgar Love (Scribe), am scribing for, and in the presence of, Dilip Valdes M.D..    Electronically signed by: Edgar Love (Scribe), 1/2/2024    IDilip M.D. personally performed the services described in this documentation, as scribed by Edgar Love in my presence, and it is both accurate and complete.      The note accurately reflects work and decisions made by me.  Dilip Valdes M.D.  1/2/2024  6:39 PM

## 2024-01-02 NOTE — ED TRIAGE NOTES
Patient presents with complaints of epigastric pain and vomiting since Saturday. Last BM this morning. Patient is a chemo patient and has a cardiac history. Was sent by his oncology provider to come to ER for further evaluation. Patient's BG was over 400 in the office and they gave him some insulin. BG 271mg/dL at time of triage.

## 2024-01-02 NOTE — ED NOTES
"Pt not feeling well  c/o nausea  per pt and wife he does have a Rx for zofran however \"the Pharmacies are out\"   VSS PVC's noted on monitor   "

## 2024-01-02 NOTE — ED NOTES
Medication printed out and ready for  at [facility].   Pt c/o feeling nauseated, states BS has been high over the last couple of days.  Pt c/o mid-epigastric pain, denies c/o CP at this time.  PIV placed, blood drawn and sent to lab.

## 2024-01-03 NOTE — ASSESSMENT & PLAN NOTE
Recently underwent chemotherapy. CT abdomen and pelvis showed diffuse thickening and edema of the duodenal bulb extending into the second and third portion, likely representing duodenitis/peptic ulcer disease.  Continue Protonix.

## 2024-01-03 NOTE — ED NOTES
Report given to Dory MCLAUGHLIN  pt's FSBS 262 SS insulin given per order  schedule insulin given as well as PO med's  given w/out c/o N/V at present

## 2024-01-03 NOTE — PROGRESS NOTES
4 Eyes Skin Assessment Completed by ARNOLDO Adams and ARNOLDO Saldana.    Head WDL  Ears Redness and Blanching  Nose WDL Face=Redness, Blanching  Mouth WDL  Neck WDL  Breast/Chest WDL  Shoulder Blades WDL  Spine WDL  (R) Arm/Elbow/Hand Redness, Abrasion, and Scab  (L) Arm/Elbow/Hand WDL  Abdomen WDL  Groin WDL  Scrotum/Coccyx/Buttocks Redness and Blanching  (R) Leg WDL  (L) Leg WDL  (R) Heel/Foot/Toe Redness and Blanching  (L) Heel/Foot/Toe Redness and Blanching          Devices In Places Blood Pressure Cuff and Pulse Ox      Interventions In Place Pillows    Possible Skin Injury No    Pictures Uploaded Into Epic N/A  Wound Consult Placed N/A  RN Wound Prevention Protocol Ordered No

## 2024-01-03 NOTE — ASSESSMENT & PLAN NOTE
Recently received chemotherapy for head and neck cancer.  Follows Dr. German.  Blood cultures pending.  Unasyn was empirically started.

## 2024-01-03 NOTE — PROGRESS NOTES
1234-Received report from ER RN.   1310-Pt arrived to floor via hospital bed.  Assumed care of pt.   Assessment and chart check complete.   Pt is AAOX4, respiration even and unlabored, no signs of distress, denies nausea/vomiting.   Denies any chest pain or epigastric pain at this moment.  IVF infusing.  PICC line in place.   Fall precautions in place, treaded socks on pt, bed in low position.   Call light within reach.   Educated pt to call if needing anything.   Hourly rounding.

## 2024-01-03 NOTE — PROGRESS NOTES
[facility] Daily Progress Note    Date of Service  1/3/2024    Chief Complaint  Pradip Baez is a 73 y.o. male admitted 1/2/2024 with intractable vomiting.  He has a history of head and neck cancer, hypertension, dyslipidemia, and diabetes. Follows Dr. German in [place] for Chemotherapy, last received a few days ago.     Hospital Course  On admission, patient was febrile (100.9) and tachycardic.  Blood pressure was elevated.  Chest x-ray was unremarkable.  CT abdomen and pelvis showed diffuse thickening and edema of the duodenal bulb extending into the second and third portion, likely representing duodenitis/peptic ulcer disease. Blood cultures were ordered, and Unasyn was empirically started.    Interval Problem Update  Afebrile, hemodynamically stable. Nausea and vomiting have subsided.  Procalcitonin was 0.15.    I have discussed this patient's plan of care and discharge plan at IDT rounds today with Case Management, Nursing, Nursing leadership, and other members of the IDT team.    Consultants/Specialty  None    Code Status  Full Code    Disposition  The patient is not medically cleared for discharge to home or a post-acute facility.  Anticipate discharge to: home with close outpatient follow-up    I have placed the appropriate orders for post-discharge needs.    Review of Systems  Review of Systems   Constitutional:  Positive for malaise/fatigue.   HENT: Negative.     Eyes: Negative.    Respiratory: Negative.     Cardiovascular: Negative.    Gastrointestinal:  Positive for nausea.   Genitourinary: Negative.    Musculoskeletal:  Positive for myalgias.   Skin: Negative.    Neurological: Negative.    Endo/Heme/Allergies: Negative.    Psychiatric/Behavioral: Negative.          Physical Exam  Temp:  [36.2 °C (97.2 °F)-37 °C (98.6 °F)] 37 °C (98.6 °F)  Pulse:  [] 88  Resp:  [16-18] 16  BP: (101-153)/(67-82) 143/74  SpO2:  [89 %-97 %] 92 %    Physical Exam  Constitutional:       Appearance: He is  ill-appearing.   HENT:      Head: Normocephalic.      Nose: Nose normal.   Eyes:      Pupils: Pupils are equal, round, and reactive to light.   Cardiovascular:      Rate and Rhythm: Tachycardia present.   Pulmonary:      Effort: Pulmonary effort is normal.   Abdominal:      Palpations: Abdomen is soft.   Musculoskeletal:      Cervical back: Normal range of motion.      Right lower leg: No edema.      Left lower leg: No edema.   Skin:     General: Skin is warm.   Neurological:      General: No focal deficit present.      Mental Status: He is alert.   Psychiatric:         Mood and Affect: Mood normal.         Fluids    Intake/Output Summary (Last 24 hours) at 1/3/2024 1725  Last data filed at 1/3/2024 1600  Gross per 24 hour   Intake 180 ml   Output 600 ml   Net -420 ml       Laboratory  Recent Labs     01/02/24  1310 01/03/24  0305   WBC 6.3 3.0*   RBC 4.70 3.90*   HEMOGLOBIN 14.3 12.0*   HEMATOCRIT 41.6* 35.1*   MCV 88.5 90.0   MCH 30.4 30.8   MCHC 34.4 34.2   RDW 39.1 39.6   PLATELETCT 153* 104*   MPV 9.2 9.5     Recent Labs     01/02/24  1310 01/03/24  0305   SODIUM 136 140   POTASSIUM 4.1 3.9   CHLORIDE 98 106   CO2 22 24   GLUCOSE 271* 133*   BUN 38* 26*   CREATININE 1.16 1.20   CALCIUM 9.6 8.5                   Imaging  CT-ABDOMEN-PELVIS WITH   Final Result      1.  Diffuse thickening and edema of the duodenal bulb extending into the second and third portions. This likely represents duodenitis/peptic ulcer disease.      2.  Fatty liver.      3.  Sigmoid diverticulosis.      4.  Vascular calcification.      DX-CHEST-PORTABLE (1 VIEW)   Final Result      No evidence of acute cardiopulmonary process.           Assessment/Plan  * Intractable vomiting- (present on admission)  Assessment & Plan  Likely multifactorial secondary to duodenitis, chemotherapy complications    Encourage oral intake as tolerated, antiemetics as needed.  Intravenous hydration until adequate oral intake is achieved.     Fever- (present on  admission)  Assessment & Plan  Recently received chemotherapy for head and neck cancer.  Follows Dr. German.  Blood cultures pending.  Unasyn was empirically started.    Head and neck cancer (HCC)- (present on admission)  Assessment & Plan  Follows Dr. German.  Received chemotherapy a few days ago.    Duodenitis- (present on admission)  Assessment & Plan  Recently underwent chemotherapy. CT abdomen and pelvis showed diffuse thickening and edema of the duodenal bulb extending into the second and third portion, likely representing duodenitis/peptic ulcer disease.  Continue Protonix.    S/P CABG x 2- (present on admission)  Assessment & Plan  Statin and aspirin were held due to duodenitis.    Essential hypertension- (present on admission)  Assessment & Plan  Continue metoprolol with parameters    Dyslipidemia- (present on admission)  Assessment & Plan  Continue statin when stable.  Currently on hold due to duodenitis    Type 2 diabetes mellitus without complication, without long-term current use of insulin (HCC)- (present on admission)  Assessment & Plan  Globin A1c was 8.1 on 10/19/2023.  Continue glargine and SSI with Accu-Cheks and hypoglycemia protocol         VTE prophylaxis: Lovenox

## 2024-01-03 NOTE — ED NOTES
Blood cultures not drawn, Unasyn given 1 time. Primary RN tried to drawn blood cultures after the first dose of antibiotic but pt refused as he would like to sleep.

## 2024-01-03 NOTE — ED NOTES
Pt resting on hospital [facility], equal rise and fall of the chest noted.

## 2024-01-03 NOTE — ASSESSMENT & PLAN NOTE
Globin A1c was 8.1 on 10/19/2023.  Continue glargine and SSI with Accu-Cheks and hypoglycemia protocol

## 2024-01-03 NOTE — H&P
[facility] Medicine History & Physical Note    Date of Service  1/2/2024    Primary Care Physician  Mayur Dorsey M.D.    Consultants  Oncology, Dr German      Code Status  Full Code    Chief Complaint  Chief Complaint   Patient presents with    Chest Pain     Epigastric area     History of Presenting Illness  Pradip Baez is a 73 y.o. male with a past medical history of head and neck cancer, hypertension, hyperlipidemia, and diabetes who presented 1/2/2024 with   Intractable vomiting. The patient reports he has not been feeling well over the past three days. He has been having generalized weakness, nauseas and vomiting. He also has abdominal pain. His sugars have been running very high, reaching mid 400's despite using insulin. He reports losing 9 lbs in that past 3 days. Last chemo was Dec 27. No fevers or chills.               I discussed the plan of care with emergency department physician, the patient and patient family present at bedside in the emergency room.     Review of Systems  Review of Systems   Constitutional:  Positive for malaise/fatigue and weight loss. Negative for chills and fever.   Eyes:  Negative for discharge and redness.   Respiratory:  Negative for cough, shortness of breath and stridor.    Cardiovascular:  Negative for chest pain and leg swelling.   Gastrointestinal:  Positive for abdominal pain, nausea and vomiting.   Genitourinary:  Negative for flank pain.   Musculoskeletal:  Negative for myalgias.   Skin: Negative.    Neurological:  Negative for focal weakness.   Endo/Heme/Allergies:  Does not bruise/bleed easily.   Psychiatric/Behavioral:  The patient is not nervous/anxious.      Past Medical History   has a past medical history of CAD (coronary artery disease), Diabetes, Diabetes (HCC), F/u of acute inferolateral myocardial infarction (HCC) (2/7/2012), Hyperlipidemia, Hypertension, and Myocardial infarct (HCC) (1/19/2012).    Surgical History   has a past surgical history that  includes stent placement; other cardiac surgery (2012); recovery (10/2/2015); and multiple coronary artery bypass endo vein harvest (10/6/2015).     Family History  family history includes Cancer in his mother; Diabetes in his father and mother; Heart Disease in his father and sister.     Social History   reports that he has never smoked. He has never used smokeless tobacco. He reports current alcohol use. He reports that he does not use drugs.    Allergies  No Known Allergies    Medications  Prior to Admission Medications   Prescriptions Last Dose Informant Patient Reported? Taking?   BD PEN NEEDLE APRIL U/F   Yes No   Cyanocobalamin (VITAMIN B-12 PO)  Patient Yes No   Sig: Take 1 Tab by mouth every day. 3000 IU SL   FREESTYLE LITE strip   Yes No   Sig: TEST DAILY DIAGNOSIS E11.65 TYPE 2 DIABETES MELLITUS, NOT ON INSULIN.   HUMALOG KWIKPEN 100 UNIT/ML Solution Pen-injector injection PEN   Yes No   Sig: 10 Units every day.   OZEMPIC, 0.25 OR 0.5 MG/DOSE, 2 MG/1.5ML Solution Pen-injector   Yes No   Sig: INJECT 0.5 MG SUBCUTANEOUSLY EVERY WEEK   TOUJEO SOLOSTAR 300 UNIT/ML Solution Pen-injector   Yes No   Si Units every day.   aspirin EC 81 MG EC tablet   Yes No   Sig: Take 1 Tab by mouth every day.   atorvastatin (LIPITOR) 80 MG tablet   No No   Sig: Take 1 Tablet by mouth every evening.   glimepiride (AMARYL) 4 MG Tab   Yes No   Sig: Take 4 mg by mouth 2 times a day.   metoprolol tartrate (LOPRESSOR) 25 MG Tab   No No   Sig: Take 1 Tablet by mouth 2 times a day.   tamsulosin (FLOMAX) 0.4 MG capsule   Yes No   Sig: Take 0.4 mg by mouth ONE-HALF HOUR AFTER BREAKFAST.      Facility-Administered Medications: None     Physical Exam  Temp:  [36.5 °C (97.7 °F)-38.3 °C (100.9 °F)] 36.7 °C (98 °F)  Pulse:  [84-96] 92  Resp:  [16-18] 18  BP: (127-197)/() 127/82  SpO2:  [92 %-99 %] 92 %  Blood Pressure : (!) 154/82   Temperature: (!) 38.3 °C (100.9 °F)   Pulse: 94   Respiration: 16   Pulse Oximetry: 94 %  "    Physical Exam  Constitutional:       General: He is not in acute distress.     Appearance: He is ill-appearing. He is not diaphoretic.   HENT:      Head: Atraumatic.      Right Ear: External ear normal.      Left Ear: External ear normal.      Nose: No congestion or rhinorrhea.      Mouth/Throat:      Mouth: Mucous membranes are dry.   Eyes:      General: No scleral icterus.        Right eye: No discharge.         Left eye: No discharge.      Pupils: Pupils are equal, round, and reactive to light.   Cardiovascular:      Rate and Rhythm: Regular rhythm. Tachycardia present.   Pulmonary:      Effort: Pulmonary effort is normal.   Abdominal:      General: There is no distension.   Musculoskeletal:      Cervical back: Neck supple. No rigidity. No muscular tenderness.      Right lower leg: No edema.      Left lower leg: No edema.   Skin:     General: Skin is dry.      Capillary Refill: Capillary refill takes 2 to 3 seconds.      Coloration: Skin is not jaundiced or pale.   Neurological:      Mental Status: He is alert and oriented to person, place, and time.      Coordination: Coordination normal.   Psychiatric:         Mood and Affect: Mood normal.         Behavior: Behavior normal.       Laboratory:  Recent Labs     01/02/24  1310   WBC 6.3   RBC 4.70   HEMOGLOBIN 14.3   HEMATOCRIT 41.6*   MCV 88.5   MCH 30.4   MCHC 34.4   RDW 39.1   PLATELETCT 153*   MPV 9.2     Recent Labs     01/02/24  1310   SODIUM 136   POTASSIUM 4.1   CHLORIDE 98   CO2 22   GLUCOSE 271*   BUN 38*   CREATININE 1.16   CALCIUM 9.6     Recent Labs     01/02/24  1310   ALTSGPT 22   ASTSGOT 11*   ALKPHOSPHAT 51   TBILIRUBIN 1.1   LIPASE 47   GLUCOSE 271*         No results for input(s): \"NTPROBNP\" in the last 72 hours.      Recent Labs     01/02/24  1310 01/02/24  1352   TROPONINT 19 20*     Imaging:  CT-ABDOMEN-PELVIS WITH   Final Result      1.  Diffuse thickening and edema of the duodenal bulb extending into the second and third portions. This " likely represents duodenitis/peptic ulcer disease.      2.  Fatty liver.      3.  Sigmoid diverticulosis.      4.  Vascular calcification.      DX-CHEST-PORTABLE (1 VIEW)   Final Result      No evidence of acute cardiopulmonary process.        Assessment/Plan:  Justification for Admission Status  I anticipate this patient is appropriate for observation status at this time because likely discharge after 1 midnight.  The patient has intractable vomiting and severe dehydration    Patient will need a  bed on medical floor.  The patient has intractable vomiting and severe dehydration.    * Intractable vomiting- (present on admission)  Assessment & Plan  Likely multifactorial secondary to duodenitis, chemotherapy complications    Encourage oral intake as tolerated, antiemetics as needed.  Intravenous hydration until adequate oral intake is achieved.     Fever- (present on admission)  Assessment & Plan  Patient is immunocompromised.  He has head and neck cancer on chemotherapy.  He is having fevers.  I will collect blood cultures, and procalcitonin  I will start empirically on Unasyn    Duodenitis- (present on admission)  Assessment & Plan  CT shows diffuse thickening and edema of the duodenal bulb extending into the second and third portions concerning for duodenitis.   I will start pantoprazole   I will check H. Pylori stool antigen   R/o COVID-19      Head and neck cancer (HCC)- (present on admission)  Assessment & Plan  On chemo, follow with Oncology, Dr. German.     S/P CABG x 2- (present on admission)  Assessment & Plan  Cardiac diet when able to take orally   I will hold statin & aspirin for now given his duodenitis      Essential hypertension- (present on admission)  Assessment & Plan  Resume metoprolol with hold parameters      Type 2 diabetes mellitus without complication, without long-term current use of insulin (HCC)- (present on admission)  Assessment & Plan  With hyperglycemia  Last glycated hemoglobin was  8.1%   I will start long & short acting insulin for now.   I will order Accu-Checks, hypoglycemia protocol.   Adjust according to blood sugars trend.     Dyslipidemia- (present on admission)  Assessment & Plan  Cardiac diet when able to take orally   I will hold statin for now       VTE prophylaxis: SCDs/TEDs and enoxaparin ppx

## 2024-01-03 NOTE — ED NOTES
Pt OOB and ambulated strong and steady to BR for BM   back to room in NAD    FSBS done 320   hospitalist at BS and is aware of   will given ER order insulin after tylenol med infused

## 2024-01-03 NOTE — CARE PLAN
The patient is Stable - Low risk of patient condition declining or worsening    Shift Goals  Clinical Goals: Pt will manage pain at tolerable level  Patient Goals: decrease nausea/vomiting, pain control    Progress made toward(s) clinical / shift goals:  Pain has been controlled throughout the shift.Denies any nausea/vomiting. Tolerating current diet.    Patient is not progressing towards the following goals:      Problem: Knowledge Deficit - Standard  Goal: Patient and family/care givers will demonstrate understanding of plan of care, disease process/condition, diagnostic tests and medications  Outcome: Progressing     Problem: Fall Risk  Goal: Patient will remain free from falls  Outcome: Progressing     Problem: Gastrointestinal Irritability  Goal: Nausea and vomiting will be absent or improve  Outcome: Progressing     Problem: Diabetes Management  Goal: Patient will achieve and maintain glucose in satisfactory range  Outcome: Progressing     Problem: Pain - Standard  Goal: Alleviation of pain or a reduction in pain to the patient’s comfort goal  Outcome: Progressing

## 2024-01-03 NOTE — ED NOTES
Med rec complete per [facility] pharmacy.  Pt is having a difficult time answering questions in regards to medications. He is unable to state how much insulin is normally used and the last time medications were last taken

## 2024-01-03 NOTE — ASSESSMENT & PLAN NOTE
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 Received low dose chemotherapy on 12/27/2023 and 12/29/2023.

## 2024-01-04 NOTE — PROGRESS NOTES
Bedside report received from ARNOLDO Cohn. Assumed care of patient. Daily plan of care discussed. Pt resting comfortably in bed with no signs of distress noted. Breathing even and unlabored. Patient reports pain level 0/10. Denies any n/v at this time. Patient reports no further needs. Call light within reach. Bed locked in lowest position. Plan of care on going.      1115: patient resting, no needs. Wife will be back tomorrow morning.

## 2024-01-04 NOTE — DIETARY
"Nutrition services: Day 2 of admit.  Pradip Baez is a 73 y.o. male with admitting DX of Intractable vomiting     Consult received for MST of 2 on nutrition screen due to report of 2-13 lb wt loss x < 1 week and poor PO PTA. He has a dx of head and neck cancer.       Assessment:  Height: 171.7 cm (5' 7.6\")  Weight: 79.1 kg (174 lb 6.1 oz)  Body mass index is 26.83 kg/m²., BMI classification: overweight  Diet/Intake: clear liquids (diabetic), no red/ % of lunch on 1/3/24    Evaluation:   History of head and neck cancer (squamous cell carcinoma), hypertension, dyslipidemia, and diabetes.   Labs: POC glucose: . 10/19/23: A1c=8.1  Meds: Lantus, SSI  Pt reports 10 lb wt loss x 4 days due to N/V. PTA, Pt was not able to keep anything down and PO was < 50% of normal during 4 day period of N/V. Pt's appetite has improved during his hospitalization. Pt does not appear malnourished. Pt does not meet criteria for malnutrition due to short time frame of poor PO intake.     Malnutrition Risk: ASPEN criteria not met.     Recommendations/Plan:   Encourage intake of >50%  Advance diet beyond clears when medically feasible.   Document intake of all meals  as % taken in ADL's to provide interdisciplinary communication across all shifts.   Monitor weight.  Nutrition rep will continue to see patient for ongoing meal and snack preferences.      RD following.   "

## 2024-01-04 NOTE — PROGRESS NOTES
Report received from Day RN, assumed care of pt.   POC and medications reviewed with pt. Pt verbalized understanding.   AOx4  C/o needing peace and quiet at this time.  Denies pain, SOB, or dizziness at this time.   Safety measures in place.  Hourly rounding in place.

## 2024-01-04 NOTE — DOCUMENTATION QUERY
[facility]                                                                       Query Response Note      PATIENT:               EARLE ALBERT  ACCT #:                  [id number]  MRN:                     [id number]  :                      1950  ADMIT DATE:       2024 1:43 PM  DISCH DATE:          RESPONDING  PROVIDER #:        [id number]           QUERY TEXT:    Patient admitted with intractable vomiting. Labs drawn  include WBC 1.8, H&H 10.6/33.4, platelets 98. Patient started chemotherapy for head and neck cancer on 23 with recent treatment received on 23.     Can a diagnosis be specified for the documented lab findings?     The patient's Clinical Indicators include:  Labs:  - : WBC 6.3, H/H 14.3/41.6,   - 1/3: WBC 3.0, H/H 12.0/35.1,   - : WBC 1.8, H/H 10.6/33.4, PLT 98    Risk Factors: Current chemotherapy for head and neck cancer   Treatment: Empiric abx, lab monitoring     Thank you for time and attention,  DORIAN Ayon RN  Available via Voalte  Options provided:   -- Pancytopenia due to chemotherapy   -- Pancytopenia due to other cause, (please specify cause)   -- Pancytopenia unspecified   -- Insignificant lab findings   -- Other explanation, (please specify the other explanation)      Query created by: Rosa Gilbert on 2024 1:07 PM    RESPONSE TEXT:    Pancytopenia due to chemotherapy          Electronically signed by:  ELISEO GEORGE MD 2024 1:28 PM

## 2024-01-04 NOTE — PROGRESS NOTES
Charge RN Note:     Received phone call from Dr. German's office. Wanting to review medication record we have on file as they state pt is not a great historian. Reviewed med rec and medications are updated but they do not have last doses for anything.

## 2024-01-04 NOTE — PROGRESS NOTES
Hospital Medicine Daily Progress Note    Date of Service  1/4/2024    Chief Complaint  Pradip Baez is a 73 y.o. male admitted 1/2/2024 with intractable vomiting.  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 Follows Dr. German in [place] for Chemotherapy, last received a few days ago. (12/27/2023 and 12/29/2023, IPT therapy).    Hospital Course  On admission, patient was febrile (100.9) and tachycardic.  Blood pressure was elevated.  Chest x-ray was unremarkable.  CT abdomen and pelvis showed diffuse thickening and edema of the duodenal bulb extending into the second and third portion, likely representing duodenitis/peptic ulcer disease. Blood cultures were ordered, and Unasyn was empirically started. Procalcitonin was 0.15.    Interval Problem Update  Temperature is 100.1, hemodynamically stable. Vomiting has subsided, still has significant nausea. Flagyl added.     I have discussed this patient's plan of care and discharge plan at IDT rounds today with Case Management, Nursing, Nursing leadership, and other members of the IDT team.    Consultants/Specialty  None    Code Status  Full Code    Disposition  The patient is not medically cleared for discharge to home or a post-acute facility.  Anticipate discharge to: home with close outpatient follow-up    I have placed the appropriate orders for post-discharge needs.    Review of Systems  Review of Systems   Constitutional:  Positive for malaise/fatigue.   HENT: Negative.     Eyes: Negative.    Respiratory: Negative.     Cardiovascular: Negative.    Gastrointestinal:  Positive for nausea.   Genitourinary: Negative.    Musculoskeletal:  Positive for myalgias.   Skin: Negative.    Neurological: Negative.    Endo/Heme/Allergies: Negative.    Psychiatric/Behavioral: Negative.          Physical Exam  Temp:  [36.8 °C (98.3 °F)-37.8 °C (100.1 °F)] 37.3 °C (99.1 °F)  Pulse:  [86-92] 92  Resp:  [16-18]  18  BP: (124-143)/(66-74) 134/72  SpO2:  [90 %-92 %] 91 %    Physical Exam  Constitutional:       General: He is not in acute distress.  HENT:      Head: Normocephalic.      Nose: Nose normal.   Eyes:      Pupils: Pupils are equal, round, and reactive to light.   Cardiovascular:      Rate and Rhythm: Normal rate.   Pulmonary:      Effort: Pulmonary effort is normal.   Abdominal:      Palpations: Abdomen is soft.   Musculoskeletal:      Cervical back: Normal range of motion.      Right lower leg: No edema.      Left lower leg: No edema.   Skin:     General: Skin is warm.   Neurological:      General: No focal deficit present.      Mental Status: He is alert.   Psychiatric:         Mood and Affect: Mood normal.         Fluids    Intake/Output Summary (Last 24 hours) at 1/4/2024 1330  Last data filed at 1/4/2024 0732  Gross per 24 hour   Intake 1080 ml   Output 450 ml   Net 630 ml       Laboratory  Recent Labs     01/02/24  1310 01/03/24  0305 01/04/24  0104   WBC 6.3 3.0* 1.8*   RBC 4.70 3.90* 3.42*   HEMOGLOBIN 14.3 12.0* 10.6*   HEMATOCRIT 41.6* 35.1* 33.4*   MCV 88.5 90.0 97.7   MCH 30.4 30.8 31.0   MCHC 34.4 34.2 31.7*   RDW 39.1 39.6 42.5   PLATELETCT 153* 104* 98*   MPV 9.2 9.5 10.4     Recent Labs     01/02/24  1310 01/03/24  0305 01/04/24  0104   SODIUM 136 140 140   POTASSIUM 4.1 3.9 3.6   CHLORIDE 98 106 109   CO2 22 24 21   GLUCOSE 271* 133* 89   BUN 38* 26* 17   CREATININE 1.16 1.20 1.05   CALCIUM 9.6 8.5 7.8*                   Imaging  CT-ABDOMEN-PELVIS WITH   Final Result      1.  Diffuse thickening and edema of the duodenal bulb extending into the second and third portions. This likely represents duodenitis/peptic ulcer disease.      2.  Fatty liver.      3.  Sigmoid diverticulosis.      4.  Vascular calcification.      DX-CHEST-PORTABLE (1 VIEW)   Final Result      No evidence of acute cardiopulmonary process.           Assessment/Plan  * Intractable vomiting- (present on admission)  Assessment &  Plan  Likely multifactorial secondary to duodenitis, chemotherapy effects.  Continue IV fluids.    Fever- (present on admission)  Assessment & Plan  Recently received chemotherapy for head and neck cancer.  Follows Dr. German.  Blood cultures pending.  Unasyn was empirically started.    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 Received low dose chemotherapy on 12/27/2023 and 12/29/2023.     Duodenitis- (present on admission)  Assessment & Plan  Recently underwent chemotherapy. CT abdomen and pelvis showed diffuse thickening and edema of the duodenal bulb extending into the second and third portion, likely representing duodenitis/peptic ulcer disease.  Continue Protonix.    S/P CABG x 2- (present on admission)  Assessment & Plan  Statin and aspirin were held due to duodenitis.    Essential hypertension- (present on admission)  Assessment & Plan  Continue metoprolol with parameters    Dyslipidemia- (present on admission)  Assessment & Plan  Continue statin when stable.  Currently on hold due to duodenitis    Type 2 diabetes mellitus without complication, without long-term current use of insulin ([facility])- (present on admission)  Assessment & Plan  Globin A1c was 8.1 on 10/19/2023.  Continue glargine and SSI with Accu-Cheks and hypoglycemia protocol         VTE prophylaxis: Lovenox

## 2024-01-04 NOTE — CARE PLAN
The patient is Stable - Low risk of patient condition declining or worsening    Shift Goals  Clinical Goals: Pt to control blood glucose to 150 or below and remain nausea and vomit free throughout remainder of shift.throughout shift  Patient Goals: Keep N/V under control.    Progress made toward(s) clinical / shift goals:  Pt controlled Blood glucose to 150 or below and remained nausea free throughout shift.    Patient is not progressing towards the following goals:

## 2024-01-05 PROBLEM — R50.9 FEVER: Status: RESOLVED | Noted: 2024-01-01 | Resolved: 2024-01-01

## 2024-01-05 NOTE — PROGRESS NOTES
Bedside report received from ARNOLDO Cohn. Assumed care of patient. Daily plan of care discussed. Pt resting comfortably in bed with no signs of distress noted. Breathing even and unlabored. No needs at this time. Call light within reach. Bed locked in lowest position. Plan of care on going.

## 2024-01-05 NOTE — PROGRESS NOTES
"Educated patient on discharge instructions, rx medications and follow up with Hem/Onc, [facility]  [address] [id number]  [phone]  Go to  Follow-up    patient voiced understanding . VS stable /72   Pulse 85   Temp 36.7 °C (98.1 °F) (Temporal)   Resp 18   Ht 1.717 m (5' 7.6\")   Wt 80.2 kg (176 lb 12.9 oz)   SpO2 96%   BMI 27.20 kg/m²    Patient alert and appropriate. All questions and concerns addressed. No further questions or concerns at this time. Copy of discharge paperwork provided.  Patient out of department with spouse and RN via WC in stable condition.    "

## 2024-01-05 NOTE — DISCHARGE SUMMARY
Discharge Summary    CHIEF COMPLAINT ON ADMISSION  Chief Complaint   Patient presents with    Chest Pain     Epigastric area       Reason for Admission  Chest Pain; Other     Admission Date  1/2/2024    CODE STATUS  Full Code    HPI & HOSPITAL COURSE  Pradip Baez is a 73 y.o. male admitted 1/2/2024 with intractable vomiting.  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 Follows Dr. German in [place] for Chemotherapy, last received a few days ago. (12/27/2023 and 12/29/2023, IPT therapy).    On admission, patient was febrile (100.9) and tachycardic.  Blood pressure was elevated. Chest x-ray was unremarkable.  CT abdomen and pelvis showed diffuse thickening and edema of the duodenal bulb extending into the second and third portion, likely representing duodenitis/peptic ulcer disease. Blood cultures were ordered, and Unasyn was empirically started. Procalcitonin was 0.15. No positive blood cultures reported.     Stool culture came back positive for H. Pylori. Quadruple therapy for 14 days has been ordered.       Therefore, he is discharged in fair and stable condition to home with close outpatient follow-up.      Discharge Date  1/5/2024    FOLLOW UP ITEMS POST DISCHARGE  PCP and Oncologist within the next few days    DISCHARGE DIAGNOSES  Principal Problem:    Intractable vomiting (POA: Yes)  Active Problems:    Type 2 diabetes mellitus without complication, without long-term current use of insulin (HCC) (POA: Yes)    Dyslipidemia (POA: Yes)    Essential hypertension (POA: Yes)    S/P CABG x 2 (POA: Yes)      Overview: Oct 2015 Coronary bypass grafting x2, left JAMES to the distal diagonal,       reverse saphenous vein graft to the distal intramyocardial LAD, endoscopic       vein harvest.    Duodenitis (POA: Yes)    Head and neck cancer (HCC) (POA: Yes)  Resolved Problems:    Fever (POA: Yes)      FOLLOW UP  Future Appointments   Date Time Provider  Department Center   1/10/2024  4:00 PM JANE Agosto None   12/10/2024  7:15 AM JANE Celis None     [facility] [address] [address]  [phone]  Go to  Follow-up      MEDICATIONS ON DISCHARGE     Medication List        START taking these medications        Instructions   bismuth subsalicylate 262 MG/15ML Susp  Commonly known as: Pepto-Bismol   Take 30 mL by mouth 4 times a day for 14 days.  Dose: 30 mL     metroNIDAZOLE 500 MG Tabs  Commonly known as: Flagyl   Take 1 Tablet by mouth 3 times a day for 14 days.  Dose: 500 mg     omeprazole 40 MG delayed-release capsule  Commonly known as: PriLOSEC   Take 1 Capsule by mouth 2 times a day for 14 days.  Dose: 40 mg     senna-docusate 8.6-50 MG Tabs  Commonly known as: Pericolace Or Senokot S   Take 1 Tablet by mouth 1 time a day as needed for Constipation.  Dose: 1 Tablet     tetracycline 500 MG Caps  Commonly known as: Sumycin   Take 1 Capsule by mouth 4 times a day for 14 days.  Dose: 500 mg            CONTINUE taking these medications        Instructions   aspirin 81 MG EC tablet   Take 1 Tab by mouth every day.  Dose: 81 mg     glimepiride 4 MG Tabs  Commonly known as: Amaryl   Take 4 mg by mouth 2 times a day.  Dose: 4 mg     HumaLOG KwikPen 100 UNIT/ML Sopn injection PEN  Generic drug: insulin lispro   3 times a day before meals. Up to 50 units per day     Lipitor 40 MG Tabs  Generic drug: atorvastatin   Take 40 mg by mouth every evening.  Dose: 40 mg     metoprolol tartrate 25 MG Tabs  Commonly known as: Lopressor   Take 1 Tablet by mouth 2 times a day.  Dose: 25 mg     ondansetron 8 MG Tabs  Commonly known as: Zofran   Take 8 mg by mouth every 8 hours as needed for Nausea/Vomiting.  Dose: 8 mg     Ozempic (0.25 or 0.5 MG/DOSE) 2 MG/1.5ML Sopn  Generic drug: Semaglutide(0.25 or 0.5MG/DOS)   Inject 0.5 mg under the skin every 7 days.  Dose: 0.5 mg      tamsulosin 0.4 MG capsule  Commonly known as: Flomax   Take 0.4 mg by mouth every day.  Dose: 0.4 mg     Toujeo SoloStar 300 UNIT/ML Sopn  Generic drug: Insulin Glargine (1 Unit Dial)   Inject 70 Units under the skin every day.  Dose: 70 Units              Allergies  No Known Allergies    DIET  Orders Placed This Encounter   Procedures    Diet Order Diet: Clear Liquid (Diabetic); Miscellaneous modifications: (optional): No Red     Standing Status:   Standing     Number of Occurrences:   1     Order Specific Question:   Diet:     Answer:   Clear Liquid [10]     Comments:   Diabetic     Order Specific Question:   Miscellaneous modifications: (optional)     Answer:   No Red [61]       ACTIVITY  As tolerated.    CONSULTATIONS  None    PROCEDURES  None    LABORATORY  Lab Results   Component Value Date    SODIUM 138 01/05/2024    POTASSIUM 3.3 (L) 01/05/2024    CHLORIDE 105 01/05/2024    CO2 18 (L) 01/05/2024    GLUCOSE 83 01/05/2024    BUN 13 01/05/2024    CREATININE 1.03 01/05/2024        Lab Results   Component Value Date    WBC 2.0 (L) 01/05/2024    HEMOGLOBIN 9.7 (L) 01/05/2024    HEMATOCRIT 28.5 (L) 01/05/2024    PLATELETCT 58 (L) 01/05/2024        Total time of the discharge process exceeds 36 minutes.

## 2024-01-05 NOTE — CARE PLAN
The patient is Stable - Low risk of patient condition declining or worsening    Shift Goals  Clinical Goals: remain fall free, pain controlled to less than 4. Monitor BG to less than 150, and no n/v  Patient Goals: Would like to go home    Progress made toward(s) clinical / shift goals:  Patient remained free from falls throughout shift, BG have been within parameters. Denies and n/v    Patient is not progressing towards the following goals:

## 2024-01-05 NOTE — PROGRESS NOTES
Report received from day RN, assumed care of pt.at 1915  POC and medications reviewed with pt. Pt verbalized understanding.   AOx4  Denies pain, SOB, or dizziness at this time.   Safety measures in place.  Hourly rounding in place.

## 2024-01-05 NOTE — DISCHARGE INSTRUCTIONS
Please take Tetracycline 500 mg four times a day for 14 days    Please take Flagyl 500 mg every 8 hours for 14 days    Please take Omeprazole 40 mg every 12 hours for 14 days    Take take Pepto-Bismol 30 ml four times a day for 14 days    Please follow up with your primary care physician in 3-4 days    Please follow up with your Oncologist (Dr. German)  as scheduled in 3 days (Monday)

## 2024-01-05 NOTE — CARE PLAN
The patient is {Patient Stability:[id number]}    Shift Goals  Clinical Goals: remain fall free, pain controlled to less than 4. Monitor BG to less than 150, and no n/v  Patient Goals: Would like to go home    Progress made toward(s) clinical / shift goals:  ***    Patient is not progressing towards the following goals:

## 2024-01-05 NOTE — CARE PLAN
The patient is Stable - Low risk of patient condition declining or worsening    Shift Goals  Clinical Goals: Pt BG monitored and less than 150 throughout shift. Pt to report pain at tolerable level 4/10 or less throughout shift.  Patient Goals: Pt states, Would love to go home tomorrow.    Progress made toward(s) clinical / shift goals:  Patient's BG remained well under 150 and pain level remained under 150.    Patient is not progressing towards the following goals:

## 2024-01-05 NOTE — PROGRESS NOTES
Report received from day RN, assumed care of pt.at 1915  POC and medications reviewed with pt. Pt verbalized understanding.   AOx4  C/o stomach discomfort after eating food at this time.  Denies pain, SOB, or dizziness at this time.   Safety measures in place.  Hourly rounding in place.

## 2024-01-14 PROBLEM — J96.01 ACUTE RESPIRATORY FAILURE WITH HYPOXIA (HCC): Status: ACTIVE | Noted: 2024-01-01

## 2024-01-14 PROBLEM — E16.2 HYPOGLYCEMIA: Status: ACTIVE | Noted: 2024-01-01

## 2024-01-14 PROBLEM — G93.40 ACUTE ENCEPHALOPATHY: Status: ACTIVE | Noted: 2024-01-01

## 2024-01-14 NOTE — PROGRESS NOTES
[facility] DIRECT ADMISSION REPORT  Transferring facility: [facility]  Transferring physician: Teresa    Chief complaint: AMS  Pertinent history & patient course: Hx of HTN, chol, CABGx2, DM2, doudenitis, head and neck cancer squamous cell getting rx. That was AMS for day prior to admit and then family finally called EMS found to have glucose 22. Was placed on ventilator. No shock stable vitals. MRI with diffuse injury concerning for anoxic. 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  Reason for Transfer: no EEG   Further work up or recommendations requested prior to transfer: none recommend pallliative care discussion.     Patient accepted for transfer: Yes  Accepting [facility] Facility: [facility] - Nursing to notify the Triage Coordinator in the RTOC via Voalte or Phone ext. [phone] when patient arrives to the unit. The Triage Coordinator will assign the admitting provider.    Consultants to be called upon arrival: none  Admission status: Inpatient.   Floor requested: RICU  If ICU transfer, name of intensivist case discussed with and pertinent input from critical care: Radha    The admitting provider is the point of contact for questions or concerns regarding patient's care.

## 2024-01-15 PROBLEM — R56.9 SEIZURE-LIKE ACTIVITY (HCC): Status: ACTIVE | Noted: 2024-01-01

## 2024-01-15 PROBLEM — Z99.11 ON MECHANICALLY ASSISTED VENTILATION (HCC): Status: ACTIVE | Noted: 2024-01-01

## 2024-01-15 PROBLEM — K92.2 GIB (GASTROINTESTINAL BLEEDING): Status: ACTIVE | Noted: 2024-01-01

## 2024-01-15 NOTE — CONSULTS
"Critical Care Consultation    Date of consult: 1/14/2024    Referring Physician  Raymond Garcia M.D.    Reason for Consultation  Critical care management for acute metabolic encephalopathy likely due to hypoglycemic event requiring continuous EEG    History of Presenting Illness  All history was obtained from review of the old chart and records from [facility] as the patient is intubated at the time of my evaluation.    Mr. Baez is a 73 year old man with the past medical history of CAD s/p PCI and 2-V CABG, recent diagnosis of duodenitis with (+) H. 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  Upon arrival EMS found an initial BG of 22.  He was given 25mg of dextrose without improvement of mental status.  The patient was brought to [facility] ER and emergently intubated for airway protection and hypoxia.  The patient had a CT head that was unremarkable as well as his laboratories.  The patient was taken off sedation and has continued to remain encephalopathic.  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  The consulting neurologist recommended that the patient undergo a 24 hour continuous EEG.  This is not available at [facility] and the patient was transferred to [facility] for continuous EEG monitoring.    Code Status  Full Code    Review of Systems  Review of Systems   Unable to perform ROS: Intubated       Past Medical History   has a past medical history of CAD (coronary artery disease), Diabetes, Diabetes (HCC), F/u of acute inferolateral myocardial infarction (HCC) (2/7/2012), Hyperlipidemia, Hypertension, and Myocardial infarct (HCC) (1/19/2012).    Surgical History   has a past surgical " history that includes stent placement; other cardiac surgery (1/19/2012); recovery (10/2/2015); and multiple coronary artery bypass endo vein harvest (10/6/2015).    Family History  family history includes Cancer in his mother; Diabetes in his father and mother; Heart Disease in his father and sister.    Social History   reports that he has never smoked. He has never used smokeless tobacco. He reports current alcohol use. He reports that he does not use drugs.    Medications  Home Medications    **Home medications have not yet been reviewed for this encounter**       Current Facility-Administered Medications   Medication Dose Route Frequency Provider Last Rate Last Admin    Respiratory Therapy Consult   Nebulization Continuous RT Victorina Christianson M.D.        senna-docusate (Pericolace Or Senokot S) 8.6-50 MG per tablet 2 Tablet  2 Tablet Enteral Tube BID Victorina Christianson M.D.        And    polyethylene glycol/lytes (Miralax) Packet 1 Packet  1 Packet Enteral Tube QDAY PRN Victorina Christianson M.D.        And    magnesium hydroxide (Milk Of Magnesia) suspension 30 mL  30 mL Enteral Tube QDAY PRN Victorina Christianson M.D.        And    bisacodyl (Dulcolax) suppository 10 mg  10 mg Rectal QDAY PRN Victorina Christianson M.D.        MD Alert...ICU Electrolyte Replacement per Pharmacy   Other PHARMACY TO DOSE Victorina Christianson M.D.        lidocaine (Xylocaine) 1 % injection 2 mL  2 mL Tracheal Tube Q30 MIN PRN Victorina Christianson M.D.        ipratropium-albuterol (DUONEB) nebulizer solution  3 mL Nebulization Q2HRS PRN (RT) Victorina Christianson M.D.        [START ON 1/15/2024] pantoprazole (Protonix) injection 40 mg  40 mg Intravenous DAILY Victorina Christianson M.D.        insulin regular (HumuLIN R,NovoLIN R) injection  3-14 Units Subcutaneous Q6HRS Victorina Christianson M.D.        And    dextrose 50% (D50W) injection 25 g  25 g Intravenous Q15 MIN PRN Victorina Christianson M.D.        metoprolol tartrate (Lopressor) tablet 25 mg  25 mg  Enteral Tube BID Victorina Christianson M.D.        lactated ringers infusion   Intravenous Continuous Victorina Christianson M.D.        enoxaparin (Lovenox) inj 40 mg  40 mg Subcutaneous DAILY AT 1800 Victorina Christianson M.D.        ondansetron (Zofran) syringe/vial injection 4 mg  4 mg Intravenous Q4HRS PRN Victorina Christianson M.D.        Pharmacy Consult: Enteral tube insertion - review meds/change route/product selection  1 Each Other PHARMACY TO DOSE Victorina Christianson M.D.        acetaminophen (Tylenol) tablet 650 mg  650 mg Enteral Tube Q6HRS PRN Victorina Christianson M.D.        ondansetron (Zofran ODT) dispertab 4 mg  4 mg Enteral Tube Q4HRS PRN Victorina Christianson M.D.        atorvastatin (Lipitor) tablet 80 mg  80 mg Enteral Tube Q EVENING Victorina Christianson M.D.        [START ON 1/15/2024] aspirin (Asa) chewable tab 81 mg  81 mg Enteral Tube DAILY Victorina Christianson M.D.        tamsulosin (Flomax) capsule 0.4 mg  0.4 mg Oral AFTER BREAKFAST Victorina Christianson M.D.           Allergies  No Known Allergies    Vital Signs last 24 hours  Temp:  [36.5 °C (97.7 °F)] 36.5 °C (97.7 °F)  Pulse:  [102-109] 109  Resp:  [20] 20  BP: (161)/(93) 161/93  SpO2:  [100 %] 100 %    Physical Exam  Physical Exam  Vitals and nursing note reviewed.   Constitutional:       Appearance: He is ill-appearing. He is not toxic-appearing.   HENT:      Head: Normocephalic and atraumatic.      Right Ear: External ear normal.      Left Ear: External ear normal.      Nose: Nose normal. No rhinorrhea.      Mouth/Throat:      Mouth: Mucous membranes are moist.      Comments: ETT in place  Eyes:      General: No scleral icterus.     Conjunctiva/sclera: Conjunctivae normal.      Pupils: Pupils are equal, round, and reactive to light.   Cardiovascular:      Rate and Rhythm: Regular rhythm. Tachycardia present.      Heart sounds: Normal heart sounds. No murmur heard.  Pulmonary:      Breath sounds: No wheezing.   Chest:      Chest wall: No tenderness.   Abdominal:       Palpations: Abdomen is soft.      Tenderness: There is no abdominal tenderness. There is no guarding or rebound.   Genitourinary:     Comments: Mcbride in place  Musculoskeletal:      Cervical back: Normal range of motion and neck supple.      Right lower leg: No edema.      Left lower leg: Edema present.   Lymphadenopathy:      Cervical: No cervical adenopathy.   Skin:     General: Skin is warm and dry.      Capillary Refill: Capillary refill takes less than 2 seconds.      Findings: Rash present.      Comments: Erythematous macular rash to bilateral arms and across the chest, non-blanching   Neurological:      Comments: No sedation, (+)cough/gag/corneal, no w/d to upper extremities with painful stimuli, bilateral toes down going, slight w/d on the left foot with painful stimuli   Psychiatric:      Comments: Unable to assess         Fluids  No intake or output data in the 24 hours ending 01/14/24 9899    Laboratory  Recent Results (from the past 48 hour(s))   CBC WITH DIFFERENTIAL    Collection Time: 01/14/24  6:30 PM   Result Value Ref Range    WBC 4.2 (L) 4.8 - 10.8 K/uL    RBC 3.70 (L) 4.70 - 6.10 M/uL    Hemoglobin 11.3 (L) 14.0 - 18.0 g/dL    Hematocrit 33.4 (L) 42.0 - 52.0 %    MCV 90.3 81.4 - 97.8 fL    MCH 30.5 27.0 - 33.0 pg    MCHC 33.8 32.3 - 36.5 g/dL    RDW 42.2 35.9 - 50.0 fL    Platelet Count 384 164 - 446 K/uL    MPV 8.4 (L) 9.0 - 12.9 fL    Neutrophils-Polys 54.20 44.00 - 72.00 %    Lymphocytes 23.70 22.00 - 41.00 %    Monocytes 18.00 (H) 0.00 - 13.40 %    Eosinophils 1.70 0.00 - 6.90 %    Basophils 0.50 0.00 - 1.80 %    Immature Granulocytes 1.90 (H) 0.00 - 0.90 %    Nucleated RBC 0.00 0.00 - 0.20 /100 WBC    Neutrophils (Absolute) 2.26 1.82 - 7.42 K/uL    Lymphs (Absolute) 0.99 (L) 1.00 - 4.80 K/uL    Monos (Absolute) 0.75 0.00 - 0.85 K/uL    Eos (Absolute) 0.07 0.00 - 0.51 K/uL    Baso (Absolute) 0.02 0.00 - 0.12 K/uL    Immature Granulocytes (abs) 0.08 0.00 - 0.11 K/uL    NRBC (Absolute) 0.00  "K/uL       Imaging  CXR(reviewed): ETT in proper position, clear lungs    Assessment/Plan  * Hypoglycemia- (present on admission)  Assessment & Plan  Unknown precipitating factor  S/p dextrose and monitor BG every 6 hours      Acute respiratory failure with hypoxia (HCC)- (present on admission)  Assessment & Plan  Intubated at [facility] on 1/12 for low GCS, not protecting airway, and hypoxia  Cont full vent support  RT/O2 protocols  Cont vent bundle protocols  I am actively adjusting vent settings based on ABGs/vent mechanics  SAT/SBTs as tolerated    Acute encephalopathy- (present on admission)  Assessment & Plan  Metabolic-->Concerning for hypoglycemic event at home vs anoxic brain injury vs seizures  MRI brain at [facility]: \"high signal periphery grey-white junction superior left frontal, parietal, and occipital lobes likely hypoglycemic encephalopathy\"  Limit sedatives  cEEG for 24 hours  Cont to correct all underlying metabolic disturbances    Head and neck cancer (HCC)- (present on admission)  Assessment & Plan  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 German    Duodenitis- (present on admission)  Assessment & Plan  Recent EGD with H pylori diagnosis on 1/2/2024 and sent home of PPI, bismuth, tetracycline, and metronidazole for 14 days  Cont daily PPI    S/P CABG x 2- (present on admission)  Assessment & Plan  Hx of   Cont BB, ASA, statin    Essential hypertension- (present on admission)  Assessment & Plan  SBP goals < 160  Cont metoprolol 25mg PO BID    Multiple vessel coronary artery disease- (present on admission)  Assessment & Plan  Hx of with PCI in the past  Cont metoprolol 25mg PO BID  ASA/high intensity statin    Type 2 diabetes mellitus without complication, without long-term current use of insulin (HCC)- (present on admission)  Assessment & Plan  BG goals 140-180s  High ISS   Hypoglycemia " protocols        Discussed patient condition and risk of morbidity and/or mortality with RN, RT, Pharmacy, Charge nurse / hot rounds, and Patient.      The patient remains critically ill.  I have assessed and reassessed the respiratory status and made ventilator adjustments based upon arterial blood gas analysis, ventilator waveforms and airway mechanics.  I have assessed and reassessed the blood pressure, hemodynamics, cardiovascular status. This patient remains at high risk for worsening cardiopulmonary dysfunction and death without the above critical care interventions.     Critical care time = 115 minutes in directly providing and coordinating critical care and extensive data review.  No time overlap and excludes procedures.

## 2024-01-15 NOTE — PROCEDURES
VIDEO ELECTROENCEPHALOGRAM REPORT  Continuous inpatient monitoring    REFERRING PROVIDER: Juan Jose Suarez M.d.  DOS: 1/15/2024  ROOM: [place] [id number]   TOTAL RECORDING TIME: 25 hours, 39 minutes of total recording time  Start time: 07:04 1/15/2024  End time: 08:43 1/16/2024    INDICATION:  Pradip Baez 73 y.o. male presenting with altered mental status    RELEVANT MEDICATIONS/TREATMENTS:   Keppra 2g load followed by 1g BID    TECHNIQUE:   CVEEG was set up by a Neurodiagnostic technologist who performed education to the patient and staff. A minimum of 23 electrodes and 23 channel recording was setup and performed by Neurodiagnostic technologist, in accordance with the international 10-20 system. Impedence, electrode integrity, and technical impressions were documented a minimum of every 2-24 hour period by a Neurodiagnostic Technologist and reviewed by Interpreting Physician. The study was reviewed in bipolar and referential montages. The recording examined the patient in the awake and drowsy/sleep and/or comatose state(s).     DESCRIPTION OF THE RECORD:  Continuous generalized predominantly theta slowing of the background is present, with intermittent superimposed faster frequency waveforms.     Sleep was captured and characterized by diffuse background delta/theta activity with a loss of myogenic artifact.  N2 sleep transients in the form of sleep spindles and vertex waves were present in the leads over the central regions.     ICTAL AND INTERICTAL FINDINGS:   1) Rare --> very rare --> resolution of left hemispheric sharp waves, maximal frontotemporal region.   2) Continuous left hemispheric slowing relative to the right  3) No seizures    ACTIVATION PROCEDURES:   NA    EKG: Single lead EKG regular.    EVENTS:  None    INTERPRETATION:  Abnormal - day #2 video EEG recording in the drowsy/sleep and/or comatose state(s):  1) Over the course of the recording, left hemispheric sharp waves, maximal frontotemporal  region,  become rare and subsequently are no longer observed.   2) Continuous left hemispheric slowing relative to the right  3) No seizures  4) Continuous generalized predominantly theta slowing of the background is present, with intermittent superimposed faster frequency waveforms.     Comments: Findings suggest a predisposition for seizures to arise from the left hemisphere, with no seizures observed. There is a resolution of this finding, which in the context of anti-seizure medication treatment, suggests their origin was epileptiform.     In addition, there is focal left hemispheric dysfunction relative to the right, a finding which may be seen in the setting of a structural abnormality, postictally, or in other instances that would confer left hemispheric dysfunction. Clinical correlation recommended. There is also evidence of mild to moderate diffuse cerebral dysfunction of a nonspecific etiology, with superimposed sedative/medication effects.       Janie Carter MD  Epileptologist/neurologist

## 2024-01-15 NOTE — ASSESSMENT & PLAN NOTE
-Metabolic-->Concerning for hypoglycemic event at home vs anoxic brain injury vs seizures  -Keep patient awake during the day and avoid daytime naps. Remove all unnecessary lines (central lines, peripheral IVs, feeding tubes, yadav catheters). Avoid polypharmacy, frequent re-orientation, maximize family time at bedside, use glasses and hearing aids if needed, treat pain, encourage ambulation, minimize benzos/anticholinergic agents.   -Fall Precautions  -Aspiration Precautions

## 2024-01-15 NOTE — ASSESSMENT & PLAN NOTE
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 Repeat MRI brain done here unchanged.  -Neurology following.   -Seizure Precautions  -continue Keppra  -repeat cEEG 1/21 negative for seizures

## 2024-01-15 NOTE — ASSESSMENT & PLAN NOTE
-Intubated for low GCS, not protecting airway, and hypoxia  -Intubation date 1/12  -Ventilator dependent respiratory failure  -Modify ventilator to optimize oxygenation, acid-base balance and ventilation  -CXR as indicated: monitor lung volumes and tube/line placement  -HOB > 30  -Titrate FiO2 to keep sats greater than 92%  -Chlorhexidine  -goal CO2 35-40  -Daily awakening and SBT trials unless contraindicated  -ABCDEF bundle  -I am actively adjusting ventilator based on clinical indicators and ABG's

## 2024-01-15 NOTE — CARE PLAN
Problem: Ventilation  Goal: Ability to achieve and maintain unassisted ventilation or tolerate decreased levels of ventilator support  Description: Target End Date:  4 days     Document on Vent flowsheet    1.  Support and monitor invasive and noninvasive mechanical ventilation  2.  Monitor ventilator weaning response  3.  Perform ventilator associated pneumonia prevention interventions  4.  Manage ventilation therapy by monitoring diagnostic test results  Outcome: Progressing     VD 2  7.5@24  ApvCmv 14/420/+8/30%

## 2024-01-15 NOTE — DISCHARGE PLANNING
Case Management Discharge Planning    Admission Date: 1/14/2024  GMLOS: 4.1  ALOS: 1    TRANSFER BACK PATIENT    Referring Facility: [facility]  Reason for Transfer: Higher level of care for seizures    Signed Repatriation/Transfer Back Agreement saved in .    Once this patient has received the requested service or the patient no longer requires tertiary level of care from [facility] and is stable to transfer back to referring facility, we can facilitate a transfer back. Please contact the [facility] at [phone] to coordinate this transfer request.

## 2024-01-15 NOTE — ASSESSMENT & PLAN NOTE
-Hypoglycemia episode prompting EMS activation and admission to outside facility  -Possible sulfonylurea overdose -octreotide every 6 hours per neurology recommendations x 24 hours with no significant improvement. Stopped 1/16  -Hypoglycemia protocol  -Dietary following for tube feeding recommendations  -try and avoid BG > 180 given risk for glucose reperfusion injury  -Insulin drip 140-180 protocol

## 2024-01-15 NOTE — PROGRESS NOTES
4 Eyes Skin Assessment Completed by ARNOLDO Diehl and ARNOLDO Pisano.    Head Blanching and Redness  Ears Redness and Blanching  Nose WDL  Mouth WDL  Neck Redness  Breast/Chest Redness, Blanching, and Rash  Shoulder Blades Redness and Blanching  Spine WDL  (R) Arm/Elbow/Hand Redness, Blanching, and Rash  (L) Arm/Elbow/Hand Redness, Blanching, and Rash  Abdomen Redness and Blanching  Groin Redness  Scrotum/Coccyx/Buttocks redness, nonblanching  (R) Leg WDL  (L) Leg WDL  (R) Heel/Foot/Toe wound   (L) Heel/Foot/Toe WDL          Devices In Places ECG, Tele Box, Blood Pressure Cuff, Pulse Ox, Mcbride, SCD's, and ET Tube      Interventions In Place Sacral Mepilex, TAP System, Pillows, Q2 Turns, Low Air Loss Mattress, Heels Loaded W/Pillows, and Pressure Redistribution Mattress    Possible Skin Injury Yes    Pictures Uploaded Into Epic Yes  Wound Consult Placed Yes  RN Wound Prevention Protocol Ordered Yes

## 2024-01-15 NOTE — DIETARY
"Nutrition Support Assessment:  Day 1 of admit.  Pradip Baez is a 73 y.o. male with admitting DX of Hypoglycemia.      Current problem list:  Seizure-like activity  GIB  Hypoglycemia  Acute encephalopathy  On mechanically assisted ventilation  Duodenitis  Head and neck cancer  S/p CABG x 2  HTN  Multiple vessel CAD  T2DM without complication     Assessment:  Estimated Nutritional Needs based on:   Height: 170.2 cm (5' 7.01\")  Weight: 75.1 kg (165 lb 9.1 oz) - bed scale  Weight to Use in Calculations: 75.1 kg (165 lb 9.1 oz)  Body mass index is 25.93 kg/m²., BMI classification: overweight    Calculation/Equation: REE per MSJ x 1.2 = 1735 kcals  RMR per PSU (VE: 5.9 L/min and Tmax: 37.6 C) = 1636 kcals  Total Calories / day: 1600 - 1800  (Calories / k - 24)  Total Grams Protein / day: 90 - 113  (Grams Protein / k.2 - 1.5)     Evaluation:   Consult received for TF.   Plan for feeding tube placement pending confirmation on KUB scan for enteral access.   Pt is intubated on ventilator support.   Pt transferred to [facility] for EEG monitoring.   Skin: pressure injury sacrum redness, L 2nd toe wound. No edema noted.   Labs: Glucose 256  Meds: lipitor, SSI, octreotide, senna, anti-emetics prn, bowel protocol, LR IV  Last BM: PTA  CHO-controlled formula is appropriate to meet estimated needs.      Malnutrition Risk: Pt appears adequately nourished, appears overweight.      Recommendations/Plan:  Start TF Glucerna 1.2 at 25 ml/hr and advance per protocol to goal rate of 65 ml/hr to provide 1872 kcals, 94 gm protein, and 1256 ml free water per day.   Fluids per MD.   Monitor blood sugars.     RD following.             "

## 2024-01-15 NOTE — ASSESSMENT & PLAN NOTE
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 German

## 2024-01-15 NOTE — ASSESSMENT & PLAN NOTE
Recent EGD with H pylori diagnosis on 1/2/2024 and sent home of PPI, bismuth, tetracycline, and metronidazole for 14 days  Cont daily PPI

## 2024-01-15 NOTE — CONSULTS
"Neurology Initial Consult H&P  Neurohospitalist Service, [facility]    Referring Physician: Juan Jose Suarez M.D.    HPI: Pradip Baez is a 73yoM with type 2 diabetes mellitus (on Lantus 70U qHS, Humalog TID, Ozempic, Glimeperide), severe CAD (s/p CABGx2), H. Pylori duodenitis, head/neck squamous cell carcinoma,  for whom neurology has been consulted for minimally responsive state of consciousness associated with severe hypoglycemia. Last known normal was ~6PM on Thurs, 1/11/2024.     History obtained from wife and two adult sons at bedside.     Per wife: He had completed week 1 of 3-week treatment course with Dr. Tinsley for head/neck squamous cell carcinoma, then opted to take brief break from treatment to return to work. He had spent the day at dental clinic on 1/11/2024. That evening, he said he felt tired around ~6PM and took a nap on their couch. He was not responsive when wife tried to awaken him for dinner at ~6:30 PM.     Wife discussed with her daughter, who advised letting him sleep because \"he was probably just tired from treatment.\" She called 911 however at morning because he was still not at baseline. Per EMS, bG was ~22 mg/dL. No improvement with 25mg dextrose. He was taken to [facility].     Hospital course, [facility]: He was emergently intubated for airway protection. CT head was unremarkable. MRI shows diffuse injury concerning for hypoglycemia-associated anoxic brain injury. He  was taken off sedation without much improvement subsequently in mentation, then transferred to [facility] for 24-hr EEG.     24-hr VEEG shows lateralized periodic discharges, suggestive of structural lesion vs late stage of status epilepticus.     1/15/42782: Localizing to noxious stimuli on exam. FSGCs show periodic hypoglycemia intermixed with hyperglycemia (to mid-250s). Insulin panel sent. Octreotide started per primary given possibility for sulfonylurea overdose.    Review of systems: In addition to " what is detailed in the HPI above, all other systems reviewed and are negative.    Past Medical History:    has a past medical history of CAD (coronary artery disease), Diabetes, Diabetes (HCC), F/u of acute inferolateral myocardial infarction (HCC) (2/7/2012), Hyperlipidemia, Hypertension, and Myocardial infarct (HCC) (1/19/2012).    FHx:  family history includes Cancer in his mother; Diabetes in his father and mother; Heart Disease in his father and sister.    SHx:   reports that he has never smoked. He has never used smokeless tobacco. He reports current alcohol use. He reports that he does not use drugs.    Allergies:  No Known Allergies    Medications:    Current Facility-Administered Medications:     LR (Bolus) infusion 500 mL, 500 mL, Intravenous, Once, Mari Arora, Stopped at 01/15/24 0250    magnesium sulfate IVPB premix 2 g, 2 g, Intravenous, Once, Juan Jose Suarez M.D.    Respiratory Therapy Consult, , Nebulization, Continuous RT, Victorina Christianson M.D.    senna-docusate (Pericolace Or Senokot S) 8.6-50 MG per tablet 2 Tablet, 2 Tablet, Enteral Tube, BID, 2 Tablet at 01/15/24 0542 **AND** polyethylene glycol/lytes (Miralax) Packet 1 Packet, 1 Packet, Enteral Tube, QDAY PRN **AND** magnesium hydroxide (Milk Of Magnesia) suspension 30 mL, 30 mL, Enteral Tube, QDAY PRN **AND** bisacodyl (Dulcolax) suppository 10 mg, 10 mg, Rectal, QDAY PRN, Victorina Christianson M.D.    MD Alert...ICU Electrolyte Replacement per Pharmacy, , Other, PHARMACY TO DOSE, Victorina Christianson M.D.    lidocaine (Xylocaine) 1 % injection 2 mL, 2 mL, Tracheal Tube, Q30 MIN PRN, Victorina Christianson M.D.    ipratropium-albuterol (DUONEB) nebulizer solution, 3 mL, Nebulization, Q2HRS PRN (RT), Victorina Christianson M.D.    insulin regular (HumuLIN R,NovoLIN R) injection, 3-14 Units, Subcutaneous, Q6HRS, 4 Units at 01/15/24 0708 **AND** POC blood glucose manual result, , , Q6H **AND** NOTIFY MD and PharmD, , , Once **AND** Administer 20 grams of  glucose (approximately 8 ounces of fruit juice) every 15 minutes PRN FSBG less than 70 mg/dL, , , PRN **AND** dextrose 50% (D50W) injection 25 g, 25 g, Intravenous, Q15 MIN PRN, Victorina Christianson M.D.    [Held by provider] metoprolol tartrate (Lopressor) tablet 25 mg, 25 mg, Enteral Tube, BID, Victorina Christianson M.D.    lactated ringers infusion, , Intravenous, Continuous, Victorina Christianson M.D., Last Rate: 75 mL/hr at 01/15/24 0315, New Bag at 01/15/24 0315    [Held by provider] enoxaparin (Lovenox) inj 40 mg, 40 mg, Subcutaneous, DAILY AT 1800, Victorina Christianson M.D.    ondansetron (Zofran) syringe/vial injection 4 mg, 4 mg, Intravenous, Q4HRS PRN, Victorina Christianson M.D.    Pharmacy Consult: Enteral tube insertion - review meds/change route/product selection, 1 Each, Other, PHARMACY TO DOSE, Victorina Christianson M.D.    acetaminophen (Tylenol) tablet 650 mg, 650 mg, Enteral Tube, Q6HRS PRN, Victorina Christianson M.D.    ondansetron (Zofran ODT) dispertab 4 mg, 4 mg, Enteral Tube, Q4HRS PRN, Victorina Christianson M.D.    atorvastatin (Lipitor) tablet 80 mg, 80 mg, Enteral Tube, Q EVENING, Victorina Christianson M.D., 80 mg at 01/14/24 2229    aspirin (Asa) chewable tab 81 mg, 81 mg, Enteral Tube, DAILY, Victorina Christianson M.D., 81 mg at 01/15/24 0542    tamsulosin (Flomax) capsule 0.4 mg, 0.4 mg, Oral, AFTER BREAKFAST, Victorina Christianson M.D.    pantoprazole (Protonix) injection 40 mg, 40 mg, Intravenous, BID, Mari GARZON Latona, 40 mg at 01/15/24 0542    tetracycline (Sumycin) capsule 500 mg, 500 mg, Enteral Tube, Q6HRS, Mari GARZON Latona, 500 mg at 01/15/24 0542    metroNIDAZOLE (Flagyl) tablet 500 mg, 500 mg, Enteral Tube, Q8HRS, Mari L. Latona, 500 mg at 01/15/24 0542    bismuth subsalicylate (Pepto-Bismol) suspension 524 mg, 30 mL, Oral, Q6HRS, Mari L. Latona, 524 mg at 01/15/24 0542    Physical Examination:     General: Patient is awake and in no acute distress  CV: regular rate   Extremities:  clear, dry, intact, without  "peripheral edema    NEUROLOGICAL EXAM:     /61   Pulse 86   Temp 36.5 °C (97.7 °F) (Bladder)   Resp 13   Ht 1.702 m (5' 7.01\")   Wt 75.1 kg (165 lb 9.1 oz)   SpO2 98%   BMI 25.93 kg/m²     Mental status: No response to voice;pain. Eyes closed.   Speech and language: Does not follow commands.   Cranial nerve exam: Brainstem reflexes intact.   Sensory exam: Withdraws to pain in all extremities and to tactile sensation when plantar surface of feet are touched. (On retrospect, I suspect this is reflexive and not purposeful.)    Objective Data:    Labs:  Lab Results   Component Value Date/Time    PROTHROMBTM 16.1 (H) 01/14/2024 06:30 PM    INR 1.28 (H) 01/14/2024 06:30 PM      Lab Results   Component Value Date/Time    WBC 7.1 01/15/2024 03:38 AM    RBC 3.44 (L) 01/15/2024 03:38 AM    HEMOGLOBIN 10.4 (L) 01/15/2024 03:38 AM    HEMATOCRIT 31.0 (L) 01/15/2024 03:38 AM    MCV 90.1 01/15/2024 03:38 AM    MCH 30.2 01/15/2024 03:38 AM    MCHC 33.5 01/15/2024 03:38 AM    MPV 8.4 (L) 01/15/2024 03:38 AM    NEUTSPOLYS 71.30 01/15/2024 03:38 AM    LYMPHOCYTES 11.40 (L) 01/15/2024 03:38 AM    MONOCYTES 15.20 (H) 01/15/2024 03:38 AM    EOSINOPHILS 0.40 01/15/2024 03:38 AM    BASOPHILS 0.30 01/15/2024 03:38 AM      Lab Results   Component Value Date/Time    SODIUM 139 01/15/2024 03:38 AM    POTASSIUM 4.1 01/15/2024 03:38 AM    CHLORIDE 103 01/15/2024 03:38 AM    CO2 24 01/15/2024 03:38 AM    GLUCOSE 256 (H) 01/15/2024 03:38 AM    BUN 12 01/15/2024 03:38 AM    CREATININE 0.98 01/15/2024 03:38 AM    BUNCREATRAT 11 12/15/2023 11:36 AM    BUNCREATRAT 16 01/31/2023 04:27 AM      Lab Results   Component Value Date/Time    CHOLSTRLTOT 147 01/31/2023 04:27 AM    LDL Comment 07/16/2018 08:37 AM    HDL 30 (L) 01/31/2023 04:27 AM    TRIGLYCERIDE 210 (H) 01/31/2023 04:27 AM       Lab Results   Component Value Date/Time    ALKPHOSPHAT 48 01/15/2024 03:38 AM    ASTSGOT 17 01/15/2024 03:38 AM    ALTSGPT 18 01/15/2024 03:38 AM    " TBILIRUBIN 0.3 01/15/2024 03:38 AM        Imaging/Testing:    I interpreted and/or reviewed the patient's neuroimaging    DX-CHEST-PORTABLE (1 VIEW)   Final Result         1.  No acute cardiopulmonary disease.      DX-CHEST-PORTABLE (1 VIEW)   Final Result         1.  No acute cardiopulmonary disease.      DX-CHEST-PORTABLE (1 VIEW)   Final Result      OUTSIDE IMAGES-CT HEAD   Final Result      OUTSIDE IMAGES-DX CHEST   Final Result      OUTSIDE IMAGES-DX CHEST   Final Result      MR-FOREIGN FILM MRI   Final Result        Scheduled Medications   Medication Dose Frequency    LR  500 mL Once    magnesium sulfate  2 g Once    senna-docusate  2 Tablet BID    MD Alert...Adult ICU Electrolyte Replacement per Pharmacy   PHARMACY TO DOSE    insulin regular  3-14 Units Q6HRS    [Held by provider] metoprolol tartrate  25 mg BID    [Held by provider] enoxaparin (LOVENOX) injection  40 mg DAILY AT 1800    Pharmacy  1 Each PHARMACY TO DOSE    atorvastatin  80 mg Q EVENING    aspirin  81 mg DAILY    tamsulosin  0.4 mg AFTER BREAKFAST    pantoprazole  40 mg BID    tetracycline  500 mg Q6HRS    metroNIDAZOLE  500 mg Q8HRS    bismuth subsalicylate  30 mL Q6HRS      Impression and Recommendations: Pradip Baez is a 73 y.o. presenting for whom neurology has been consulted for minimally conscious state in setting of severe, likely prolonged, hypoglycemia.     # unresponsiveness: Likely due to prolonged state of hypoglycemia (~12 hours per discussion with wife this morning). Secondary NCSE possible but unlikely. Cause of hypoglycemia unknown given concomitant use of insulin and sulfonylurea at home. Last dose of Lantus possibly on Thurs, 1/11/2024, given bG 22 mg/dL per EMS.     Plan:   - Keppra started for empiric treatment of NCSE.    - Neuro checks q1-2 hrs advised (to extent possible per staff)  - Cont seizure precautions, vent management per primary  - Cont 24-hr VEEG, so far shows lateralized periodic discharges  - Insulin  panel ordered to assess for sulfonylurea overdose.   - Agree with primary re: starting Octreotide for above differential  - Avoid bG >180 (given risk for glucose reperfusion injury)    Camilo Schroeder,   PGY2, Internal Medicine    I saw and examined Mr. Baez with Dr. Schroeder, and I agree with the assessment and plan.    Carlos Rey MD  Neurohospitalist

## 2024-01-15 NOTE — PROGRESS NOTES
Pharmacy Medication Reconciliation    ~Med rec updated and complete per patient home pharmacy   ~Pt home pharmacy: [facility]      ~Patient home pharmacy reports that patient received a 14 day course of Flagyl that on 01/05/2024          ~Anticoagulants (rivaroxaban, apixaban, edoxaban, dabigatran, warfarin, enoxaparin) taken in the last 14 days? No

## 2024-01-15 NOTE — EEG PROGRESS NOTE
BRIEF VIDEO EEG UPDATE NOTE    This is a 73-year-old man who is being evaluated for seizures.    The first 5 hours of the study were reviewed.     The background was diffusely slow with prominent delta frequencies over the left hemisphere, as well as sharp triphasic waveforms on the left.     As the study progressed, there were runs of left periodic discharges, occurring at up to 1/second. This finding might be seen in context of structural lesion, CNS infection, and or late stage of status epilepticus, among other considerations. Clinical correlation is recommended.     No electrographic seizures were captured.    Full report to follow in AM.    Garrick Schmitz MD  Neurology Attending, Epilepsy Program  [facility]

## 2024-01-15 NOTE — PROCEDURES
VIDEO ELECTROENCEPHALOGRAM REPORT  Continuous inpatient monitoring    REFERRING PROVIDER: Juan Jose Suarez M.d.  DOS: 1/15/2023  ROOM: R116/00   TOTAL RECORDING TIME: 11 hours and 41 minutes of total recording time    INDICATION:  Pradip Baez 73 y.o. male presenting with altered mental status    RELEVANT MEDICATIONS/TREATMENTS:   Scheduled Medications   Medication Dose Frequency    LR  500 mL Once    senna-docusate  2 Tablet BID    MD Alert...Adult ICU Electrolyte Replacement per Pharmacy   PHARMACY TO DOSE    insulin regular  3-14 Units Q6HRS    metoprolol tartrate  25 mg BID    [Held by provider] enoxaparin (LOVENOX) injection  40 mg DAILY AT 1800    Pharmacy  1 Each PHARMACY TO DOSE    atorvastatin  80 mg Q EVENING    aspirin  81 mg DAILY    tamsulosin  0.4 mg AFTER BREAKFAST    pantoprazole  40 mg BID    tetracycline  500 mg Q6HRS    metroNIDAZOLE  500 mg Q8HRS    bismuth subsalicylate  30 mL Q6HRS         TECHNIQUE:   CVEEG was set up by a Neurodiagnostic technologist who performed education to the patient and staff. A minimum of 23 electrodes and 23 channel recording was setup and performed by Neurodiagnostic technologist, in accordance with the international 10-20 system. Impedence, electrode integrity, and technical impressions were documented a minimum of every 2-24 hour period by a Neurodiagnostic Technologist and reviewed by Interpreting Physician. The study was reviewed in bipolar and referential montages. The recording examined the patient in the awake and drowsy/sleep and/or comatose state(s).     DESCRIPTION OF THE RECORD:  Continuous generalized predominantly theta slowing of the background is present, with intermittent superimposed faster frequency waveforms.     Sleep was captured and characterized by diffuse background delta/theta activity with a loss of myogenic artifact.  N2 sleep transients in the form of sleep spindles and vertex waves were present in the leads over the central  regions.     ICTAL AND INTERICTAL FINDINGS:   1) Frequent left hemispheric sharp waves, at times organizing into lateralized periodic discharges without evolution to seizures, maximal frontotemporal region.   2) Continuous left hemispheric slowing relative to the right  3) No seizures    ACTIVATION PROCEDURES:   NA    EKG: Single lead EKG regular.    EVENTS:  None    INTERPRETATION:  Abnormal - day #1 video EEG recording in the drowsy/sleep and/or comatose state(s):  1) Frequent left hemispheric sharp waves, at times organizing into lateralized periodic discharges without evolution to seizures, maximal frontotemporal region  2) Continuous left hemispheric slowing relative to the right  3) No seizures  4) Continuous generalized predominantly theta slowing of the background is present, with intermittent superimposed faster frequency waveforms.     Comments: Findings suggest a predisposition for seizures to arise from the left hemisphere, with no seizures observed. In addition, there is focal left hemispheric dysfunction relative to the right, a finding which may be seen in the setting of a structural abnormality, postictally, or in other instances that would confer left hemispheric dysfunction. Clinical correlation recommended. There is also evidence of mild to moderate diffuse cerebral dysfunction of a nonspecific etiology, with superimposed sedative/medication effects.       Janie Carter MD  Epileptologist/neurologist  1/15/2024

## 2024-01-15 NOTE — DISCHARGE PLANNING
Case Management Discharge Planning    Chart reviewed and case discussed in ICU rounds:   Transfer back agreement in place with [facility].   Patient transferred to [facility] for EEG.  cEEG in progress.   +Vent (day 2).  Neurology following.     CM to f/u for assessment and DCP discussion.   Current DC plan is for patient to transfer back to [facility] on Neurology workup/EEG is completed.

## 2024-01-15 NOTE — PROGRESS NOTES
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 Pylori who transferred here with seizures requiring cEEG. Per wife, patient began exhibiting AMS on 1/11. On 1/12 patient was not improving and wife called EMS. Upon arrival of EMS, patients glucose was 22 and he was given 25 gm Dextrose with no improvement in mentation. He was brought to [facility] ED and was emergently intubated for hypoxia and airway protection. 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 Neurology was consulted and recommended 24 hour EEG which is not available at [facility].    Interval Problem Update  Reviewed last 24 hour events:  No seizures on EEG overnight.  SR 80-90's  SBP's soft overnight. 500 bolus overnight.  Vent day #2: APVC 14/420/8/40%  No sedation. RASS -4.  Neuro: flaccid BUE. Withdraws BLE. Reflexes intact. Does not follow commands. Does not open eyes. PERRLA.   NPO. Start TF's today.  Mcbride 325 overnight.  PICC from previous facility  I/O: 1675/365  PPI, Lovenox on hold.   Mobility 1 - not eligible to advance due to cEEG.    Review of Systems  Review of Systems   Unable to perform ROS: Intubated        Vital Signs for last 24 hours   Temp:  [36.5 °C (97.7 °F)] 36.5 °C (97.7 °F)  Pulse:  [] 80  Resp:  [13-39] 15  BP: ()/(46-93) 135/78  SpO2:  [96 %-100 %] 98 %    Hemodynamic parameters for last 24 hours       Respiratory Information for the last 24 hours  Vent Mode: APVCMV  Rate (breaths/min): 14  Vt Target (mL): 420  PEEP/CPAP: 8  MAP: 10  Control VTE (exp VT): 416    Physical Exam   Physical Exam  Vitals and nursing note reviewed.   Constitutional:      "  General: He is not in acute distress.     Appearance: Normal appearance. He is ill-appearing.      Interventions: He is intubated.   HENT:      Head: Normocephalic.      Nose: Nose normal.      Mouth/Throat:      Lips: Pink.      Mouth: Mucous membranes are moist.   Eyes:      Pupils: Pupils are equal, round, and reactive to light.   Cardiovascular:      Rate and Rhythm: Normal rate and regular rhythm.      Pulses: Normal pulses.      Heart sounds: Normal heart sounds.   Pulmonary:      Effort: Pulmonary effort is normal. He is intubated.      Breath sounds: No wheezing or rhonchi.   Abdominal:      General: Bowel sounds are normal.      Palpations: Abdomen is soft.   Musculoskeletal:      Right lower leg: No edema.      Left lower leg: No edema.   Skin:     General: Skin is warm and dry.   Neurological:      GCS: GCS eye subscore is 1. GCS verbal subscore is 1. GCS motor subscore is 4.      Comments: 6T   Psychiatric:      Comments: Intubated         Medications  Current Facility-Administered Medications   Medication Dose Route Frequency Provider Last Rate Last Admin    LR (Bolus) infusion 500 mL  500 mL Intravenous Once Mari VERGARAEileen Arora   Stopped at 01/15/24 0250    octreotide (SandoSTATIN) injection 50 mcg  50 mcg Intravenous Q6HRS ALEXANDRE VargasPEileenREileenN.   50 mcg at 01/15/24 1106    Pharmacy Consult: Enteral tube insertion - review meds/change route/product selection  1 Each Other PHARMACY TO DOSE ALEXANDRE VargasP.RNOAH.        bismuth subsalicylate (Pepto-Bismol) suspension 524 mg  30 mL Enteral Tube Q6HRS ALEXANDRE VargasP.R.N.        levETIRAcetam (Keppra) injection 1,000 mg  1,000 mg Intravenous Q12HRS Camilo Schroeder D.O.        Respiratory Therapy Consult   Nebulization Continuous RT Victorina Christianson M.D.        senna-docusate (Pericolace Or Senokot S) 8.6-50 MG per tablet 2 Tablet  2 Tablet Enteral Tube BID Victorina Christianson M.D.   2 Tablet at 01/15/24 0542    And    polyethylene glycol/lytes (Miralax)  Packet 1 Packet  1 Packet Enteral Tube QDAY PRN Victorina Christianson M.D.        And    magnesium hydroxide (Milk Of Magnesia) suspension 30 mL  30 mL Enteral Tube QDAY PRN Victorina Christianson M.D.        And    bisacodyl (Dulcolax) suppository 10 mg  10 mg Rectal QDAY PRN Victorina Christianson M.D. MD Alert...ICU Electrolyte Replacement per Pharmacy   Other PHARMACY TO DOSE Victorina Christianson M.D.        lidocaine (Xylocaine) 1 % injection 2 mL  2 mL Tracheal Tube Q30 MIN PRN Victorina Christianson M.D.        ipratropium-albuterol (DUONEB) nebulizer solution  3 mL Nebulization Q2HRS PRN (RT) Victorina Christianson M.D.        insulin regular (HumuLIN R,NovoLIN R) injection  3-14 Units Subcutaneous Q6HRS Victorina Christianson M.D.   3 Units at 01/15/24 1114    And    dextrose 50% (D50W) injection 25 g  25 g Intravenous Q15 MIN PRN Victorina Christianson M.D.        [Held by provider] metoprolol tartrate (Lopressor) tablet 25 mg  25 mg Enteral Tube BID Victorina Christianson M.D.        lactated ringers infusion   Intravenous Continuous Victorina Christianson M.D. 75 mL/hr at 01/15/24 0315 New Bag at 01/15/24 0315    [Held by provider] enoxaparin (Lovenox) inj 40 mg  40 mg Subcutaneous DAILY AT 1800 Victorina Christianson M.D.        ondansetron (Zofran) syringe/vial injection 4 mg  4 mg Intravenous Q4HRS PRN Victorina Christianson M.D.        acetaminophen (Tylenol) tablet 650 mg  650 mg Enteral Tube Q6HRS PRN Victorina Christianson M.D.        ondansetron (Zofran ODT) dispertab 4 mg  4 mg Enteral Tube Q4HRS PRN Victorina Christianson M.D.        atorvastatin (Lipitor) tablet 80 mg  80 mg Enteral Tube Q EVENING Victorina Christianson M.D.   80 mg at 01/14/24 2229    aspirin (Asa) chewable tab 81 mg  81 mg Enteral Tube DAILY Victorina Christianson M.D.   81 mg at 01/15/24 0542    tamsulosin (Flomax) capsule 0.4 mg  0.4 mg Oral AFTER BREAKFAST Victorina Christianson M.D.        pantoprazole (Protonix) injection 40 mg  40 mg Intravenous BID Mari Arora   40 mg at 01/15/24 0555     tetracycline (Sumycin) capsule 500 mg  500 mg Enteral Tube Q6HRS Mari L. Latona   500 mg at 01/15/24 1120    metroNIDAZOLE (Flagyl) tablet 500 mg  500 mg Enteral Tube Q8HRS Mari L. Latona   500 mg at 01/15/24 1355       Fluids    Intake/Output Summary (Last 24 hours) at 1/15/2024 1516  Last data filed at 1/15/2024 1300  Gross per 24 hour   Intake 2174.27 ml   Output 765 ml   Net 1409.27 ml       Laboratory  Recent Labs     01/14/24  1924 01/15/24  0238   ISTATAPH 7.489 7.495   ISTATAPCO2 32.4 30.8   ISTATAPO2 152* 84   ISTATATCO2 26 25   HVNSCLE7GEN 100* 97   ISTATARTHCO3 24.7 23.8   ISTATARTBE 2 1   ISTATTEMP 100.2 F 97.0 F   ISTATFIO2 50 30   ISTATSPEC Arterial Arterial   ISTATAPHTC 7.476 7.509*   PTBCEXVZ2EC 158* 80         Recent Labs     01/14/24  1830 01/15/24  0338   SODIUM 138 139   POTASSIUM 3.5* 4.1   CHLORIDE 101 103   CO2 25 24   BUN 12 12   CREATININE 1.01 0.98   MAGNESIUM  --  1.6   PHOSPHORUS  --  2.8   CALCIUM 8.3* 8.4*     Recent Labs     01/14/24  1830 01/15/24  0338 01/15/24  1056   ALTSGPT 19 18  --    ASTSGOT 13 17  --    ALKPHOSPHAT 53 48  --    TBILIRUBIN 0.2 0.3  --    PREALBUMIN  --  8.6* 8.8*   GLUCOSE 303* 256*  --      Recent Labs     01/14/24  1830 01/15/24  0338   WBC 4.2* 7.1   NEUTSPOLYS 54.20 71.30   LYMPHOCYTES 23.70 11.40*   MONOCYTES 18.00* 15.20*   EOSINOPHILS 1.70 0.40   BASOPHILS 0.50 0.30   ASTSGOT 13 17   ALTSGPT 19 18   ALKPHOSPHAT 53 48   TBILIRUBIN 0.2 0.3     Recent Labs     01/14/24  1830 01/15/24  0338   RBC 3.70* 3.44*   HEMOGLOBIN 11.3* 10.4*   HEMATOCRIT 33.4* 31.0*   PLATELETCT 384 374   PROTHROMBTM 16.1*  --    INR 1.28*  --        Imaging  X-Ray:  I have personally reviewed the images and compared with prior images.  MRI:   Reviewed    Assessment/Plan  * Seizure-like activity (HCC)- (present on admission)  Assessment & Plan  -Patient transferred here for cEEG services not available at outside facility  -Brain MRI at outside facility showing likely  "neuroglycopenia injury  -Neurology following.  Suggest hypoglycemia due to possible sulfonylurea overdose - started Octreotide  -Seizure Precautions    GIB (gastrointestinal bleeding)  Assessment & Plan  -EGD 1/2/2024 showed H. pylori, patient was sent home on PPI, bismuth, tetracycline, and metronidazole for 14 days  -suspected, mild  -Hgb stable  -gastric occult (+)  -PPI  -consider GI consult if worsens    On mechanically assisted ventilation (HCC)- (present on admission)  Assessment & Plan  -Intubated for low GCS, not protecting airway, and hypoxia  -Intubation date 1/12  -Ventilator dependent respiratory failure  -Modify ventilator to optimize oxygenation, acid-base balance and ventilation  -CXR as indicated: monitor lung volumes and tube/line placement  -HOB > 30  -Titrate FiO2 to keep sats greater than 92%  -Chlorhexidine  -goal CO2 35-40  -Daily awakening and SBT trials unless contraindicated  -ABCDEF bundle  -I am actively adjusting ventilator based on clinical indicators and ABG's      Acute encephalopathy- (present on admission)  Assessment & Plan  Metabolic-->Concerning for hypoglycemic event at home vs anoxic brain injury vs seizures  MRI brain at [facility]: \"high signal periphery grey-white junction superior left frontal, parietal, and occipital lobes likely hypoglycemic encephalopathy\"  Limit sedatives  cEEG for 24 hours  Cont to correct all underlying metabolic disturbances    Hypoglycemia- (present on admission)  Assessment & Plan  -see \"type 2 diabetes mellitus\"      Head and neck cancer (HCC)- (present on admission)  Assessment & Plan  -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 German    S/P CABG x 2- (present on admission)  Assessment & Plan  -History of 2015  -Continue statin and aspirin    Essential hypertension- (present on admission)  Assessment & Plan  -SBP goals < 160  -PRN's " available    Multiple vessel coronary artery disease- (present on admission)  Assessment & Plan  -S/p Stent placement followed by CABG 2015    Type 2 diabetes mellitus without complication, without long-term current use of insulin (HCC)- (present on admission)  Assessment & Plan  -Hypoglycemia episode prompting EMS activation and admission to outside facility  -Possible sulfonylurea overdose -octreotide every 6 hours per neurology recommendations  -Monitor closely for hypoglycemia episodes.  Hypoglycemia protocols  -Accu-Cheks every 6 hours and as needed  -Dietary consult for tube feeding recommendations         VTE:  Contraindicated  Ulcer: PPI  Lines: Mcbride Catheter  Ongoing indication addressed and PICC Line    I have performed a physical exam and reviewed and updated ROS and Plan today (1/15/2024). In review of yesterday's note (1/14/2024), there are no changes except as documented above.     Discussed patient condition and risk of morbidity and/or mortality with RN, RT, Pharmacy, , neurology, and my attending Dr. Suarez  The patient remains critically ill.  Critical care time = 61 minutes in directly providing and coordinating critical care and extensive data review.  No time overlap and excludes procedures.    Please note that this dictation was created using voice recognition software. I have made every reasonable attempt to correct obvious errors, but there may be errors of grammar and possibly content that I did not discover before finalizing the note.    YOLANDA Vargas.

## 2024-01-16 NOTE — DIETARY
Nutrition Services Brief Update:    Problem: Nutritional:  Goal: Nutrition support tolerated and meeting greater than 85% of estimated needs  Outcome: MET    Pt is receiving TF Glucerna 1.2 with goal rate 65 ml/hr.     RD following.

## 2024-01-16 NOTE — PROGRESS NOTES
Neurology Progress Note  Neurohospitalist Service, [facility]    Referring Physician: Juan Jose Suarez M.D.    No chief complaint on file.      HPI: Refer to initial documented Neurology H&P, as detailed in the patient's chart.    Interval History:   No response to pain/voice.    Review of systems: In addition to what is detailed in the HPI and/or updated in the interval history, all other systems reviewed and are negative.    Past Medical History:    has a past medical history of CAD (coronary artery disease), Diabetes, Diabetes (HCC), F/u of acute inferolateral myocardial infarction (HCC) (2/7/2012), Hyperlipidemia, Hypertension, and Myocardial infarct (HCC) (1/19/2012).    FHx:  family history includes Cancer in his mother; Diabetes in his father and mother; Heart Disease in his father and sister.    SHx:   reports that he has never smoked. He has never used smokeless tobacco. He reports current alcohol use. He reports that he does not use drugs.    Medications:    Current Facility-Administered Medications:     insulin regular (HumuLIN R,NovoLIN R) injection, 0-14 Units, Intravenous, Once, Tammy Morton A.P.R.N.    insulin regular (Humulin R) 100 Units in  mL Infusion, 0-29 Units/hr, Intravenous, Continuous, Tammy Morton A.P.R.N., Last Rate: 3 mL/hr at 01/16/24 1345, 3 Units/hr at 01/16/24 1345    dextrose 50% (D50W) injection 12.5-25 g, 12.5-25 g, Intravenous, PRN, Tammy Morton A.P.R.N.    Pharmacy Consult: Enteral tube insertion - review meds/change route/product selection, 1 Each, Other, PHARMACY TO DOSE, DELON Vargas.P.R.N.    bismuth subsalicylate (Pepto-Bismol) suspension 524 mg, 30 mL, Enteral Tube, Q6HRS, DELON Vargas.P.R.N., 524 mg at 01/16/24 1212    [COMPLETED] levETIRAcetam (Keppra) injection 2,000 mg, 2,000 mg, Intravenous, Once, 2,000 mg at 01/15/24 1356 **AND** levETIRAcetam (Keppra) injection 1,000 mg, 1,000 mg, Intravenous, Q12HRS, Camilo Schroeder,  D.O., 1,000 mg at 01/16/24 0524    Respiratory Therapy Consult, , Nebulization, Continuous RT, Victorina Christianson M.D.    senna-docusate (Pericolace Or Senokot S) 8.6-50 MG per tablet 2 Tablet, 2 Tablet, Enteral Tube, BID, 2 Tablet at 01/16/24 0523 **AND** polyethylene glycol/lytes (Miralax) Packet 1 Packet, 1 Packet, Enteral Tube, QDAY PRN **AND** magnesium hydroxide (Milk Of Magnesia) suspension 30 mL, 30 mL, Enteral Tube, QDAY PRN **AND** bisacodyl (Dulcolax) suppository 10 mg, 10 mg, Rectal, QDAY PRN, Victorina Christianson M.D.    MD Alert...ICU Electrolyte Replacement per Pharmacy, , Other, PHARMACY TO DOSE, Victorina Christianson M.D.    lidocaine (Xylocaine) 1 % injection 2 mL, 2 mL, Tracheal Tube, Q30 MIN PRN, Victorina Christianson M.D.    ipratropium-albuterol (DUONEB) nebulizer solution, 3 mL, Nebulization, Q2HRS PRN (RT), Victorina Christianson M.D.    metoprolol tartrate (Lopressor) tablet 25 mg, 25 mg, Enteral Tube, BID, Victorina Christianson M.D.    enoxaparin (Lovenox) inj 40 mg, 40 mg, Subcutaneous, DAILY AT 1800, Victorina Christianson M.D.    ondansetron (Zofran) syringe/vial injection 4 mg, 4 mg, Intravenous, Q4HRS PRN, Victorina Christianson M.D.    acetaminophen (Tylenol) tablet 650 mg, 650 mg, Enteral Tube, Q6HRS PRN, Victorina Christianson M.D.    ondansetron (Zofran ODT) dispertab 4 mg, 4 mg, Enteral Tube, Q4HRS PRN, Victorina Christianson M.D.    atorvastatin (Lipitor) tablet 80 mg, 80 mg, Enteral Tube, Q EVENING, Victorina Christianson M.D., 80 mg at 01/15/24 1730    aspirin (Asa) chewable tab 81 mg, 81 mg, Enteral Tube, DAILY, Victorina Christianson M.D., 81 mg at 01/16/24 0524    pantoprazole (Protonix) injection 40 mg, 40 mg, Intravenous, BID, Mari L. Latona, 40 mg at 01/16/24 0524    tetracycline (Sumycin) capsule 500 mg, 500 mg, Enteral Tube, Q6HRS, Mari L. Latona, 500 mg at 01/16/24 1212    metroNIDAZOLE (Flagyl) tablet 500 mg, 500 mg, Enteral Tube, Q8HRS, Mari L. Latona, 500 mg at 01/16/24 1349    Physical Examination:     Vitals:     01/16/24 1040 01/16/24 1100 01/16/24 1200 01/16/24 1300   BP:  (!) 146/85 (!) 151/75 (!) 154/75   Pulse: 81 81 80 80   Resp: 14 (!) 24 (!) 25 (!) 27   Temp:       TempSrc:   Bladder    SpO2: 98%  99% 98%   Weight:       Height:           General: eyes closed; lying in NAD    NEUROLOGICAL EXAM:     MS: no response to voice/pain.   CN: PEERL, slight side to side movements - no gaze preference; unable to eval VF, no obvious facial asymmetry; unable to test uvular lift and tongue protrusion, SCM and trap; Hearing intact grossly.  MOT: Nl bulk and tone.  No movement of B UE to pain; triple flexion to plantar stimulation bilaterally - more briskly on the right than left.   SENS: response to pain as above    Objective Data:    Labs:  Lab Results   Component Value Date/Time    PROTHROMBTM 16.1 (H) 01/14/2024 06:30 PM    INR 1.28 (H) 01/14/2024 06:30 PM      Lab Results   Component Value Date/Time    WBC 5.2 01/16/2024 04:30 AM    RBC 3.48 (L) 01/16/2024 04:30 AM    HEMOGLOBIN 10.6 (L) 01/16/2024 04:30 AM    HEMATOCRIT 32.5 (L) 01/16/2024 04:30 AM    MCV 93.4 01/16/2024 04:30 AM    MCH 30.5 01/16/2024 04:30 AM    MCHC 32.6 01/16/2024 04:30 AM    MPV 8.3 (L) 01/16/2024 04:30 AM    NEUTSPOLYS 59.00 01/16/2024 04:30 AM    LYMPHOCYTES 20.90 (L) 01/16/2024 04:30 AM    MONOCYTES 15.50 (H) 01/16/2024 04:30 AM    EOSINOPHILS 1.90 01/16/2024 04:30 AM    BASOPHILS 0.60 01/16/2024 04:30 AM      Lab Results   Component Value Date/Time    SODIUM 136 01/16/2024 04:30 AM    POTASSIUM 4.3 01/16/2024 04:30 AM    CHLORIDE 101 01/16/2024 04:30 AM    CO2 25 01/16/2024 04:30 AM    GLUCOSE 283 (H) 01/16/2024 04:30 AM    BUN 15 01/16/2024 04:30 AM    CREATININE 1.13 01/16/2024 04:30 AM    BUNCREATRAT 11 12/15/2023 11:36 AM    BUNCREATRAT 16 01/31/2023 04:27 AM      Lab Results   Component Value Date/Time    CHOLSTRLTOT 147 01/31/2023 04:27 AM    LDL Comment 07/16/2018 08:37 AM    HDL 30 (L) 01/31/2023 04:27 AM    TRIGLYCERIDE 210 (H) 01/31/2023  04:27 AM       Lab Results   Component Value Date/Time    ALKPHOSPHAT 48 01/15/2024 03:38 AM    ASTSGOT 17 01/15/2024 03:38 AM    ALTSGPT 18 01/15/2024 03:38 AM    TBILIRUBIN 0.3 01/15/2024 03:38 AM        Imaging/Testing:    Coma after prolonged hypoglycemia - he has ongoing unresponsiveness. L sided sharps resolved after start keppra. No evidence of seizures. Stop cEEG. Given he is not localizing pain after 4 days, prognosis is extremely guarded; however, it cannot be definitively determined at this time.     Plan:     -continue keppra 1000 mg bid  -stop cEEG  -follow exam.   -no objection to transfer back to [facility] from a neuro standpoint.    Carlos Rey MD  Board Certified Neurology, ABPN

## 2024-01-16 NOTE — CARE PLAN
The patient is Watcher - Medium risk of patient condition declining or worsening    Shift Goals  Clinical Goals: stable EEG, Neuro exams  Patient Goals: terri  Family Goals: updates    Progress made toward(s) clinical / shift goals:    Problem: Fall Risk  Goal: Patient will remain free from falls  Outcome: Progressing     Problem: Hemodynamics  Goal: Patient's hemodynamics, fluid balance and neurologic status will be stable or improve  Outcome: Progressing     Problem: Nutrition  Goal: Patient's nutritional and fluid intake will be adequate or improve  Outcome: Progressing  Goal: Enteral nutrition will be maintained or improve  Outcome: Progressing       Patient is not progressing towards the following goals:

## 2024-01-16 NOTE — DISCHARGE PLANNING
Medical Social Work    Pt discussed in ICU rounds. Pt transferred to [facility] for continuous EEG and was taken off today. Pt is now cleared to transfer back to [facility] and APRN initiated transfer back w/ RTOC.     LMSW notified that pt's spouse is refusing to have the pt transferred back to [facility]. LMSW escalated situation to [facility].

## 2024-01-16 NOTE — HOSPITAL COURSE
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 Pylori who transferred here with seizures requiring cEEG. Per wife, patient began exhibiting AMS on 1/11. On 1/12 patient was not improving and wife called EMS. Upon arrival of EMS, patients glucose was 22 and he was given 25 gm Dextrose with no improvement in mentation. He was brought to [facility] ED and was emergently intubated for hypoxia and airway protection. 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 Neurology was consulted and recommended 24 hour EEG which is not available at [facility].     Per epileptologist interpreting the EEG 1/16: \"Findings suggest a predisposition for seizures to arise from the left hemisphere, with no seizures observed. There is a resolution of this finding, which in the context of anti-seizure medication treatment, suggests their origin was epileptiform.      In addition, there is focal left hemispheric dysfunction relative to the right, a finding which may be seen in the setting of a structural abnormality, postictally, or in other instances that would confer left hemispheric dysfunction. Clinical correlation recommended. There is also evidence of mild to moderate diffuse cerebral dysfunction of a nonspecific etiology, with superimposed sedative/medication effects\".     1/16 - stopped cEEG. Vent day  #4. No purposeful movements, triple flexion response BLE only. (+) cough/gag/corneals. DNR, I OK.  1/17 -vent day #5.  No significant neurological changes  1/18 - Vent day #6. No significant neurological changes  1/19 - Vent day #7. Palliative Care consult. Plan for comfort care Monday 1/20 - Vent day #8. Family now wanting to wait 14 days (1/26) from injury to transition to " comfort care.   1/21 - Vent day #9. Repeat cEEG negative for seizures.   1/22 - Vent day #10. Repeat brain MRI without significant changes.  1/23 - Vent day #11.   1/24 - Vent day #12

## 2024-01-16 NOTE — CARE PLAN
Problem: Ventilation  Goal: Ability to achieve and maintain unassisted ventilation or tolerate decreased levels of ventilator support  Description: Target End Date:  4 days     Document on Vent flowsheet    1.  Support and monitor invasive and noninvasive mechanical ventilation  2.  Monitor ventilator weaning response  3.  Perform ventilator associated pneumonia prevention interventions  4.  Manage ventilation therapy by monitoring diagnostic test results  Outcome: Not Met       Vent Day 3  7.5@24  ApvCmv 14/420/+8/30%

## 2024-01-16 NOTE — CARE PLAN
Problem: Safety - Medical Restraint  Goal: Remains free of injury from restraints (Restraint for Interference with Medical Device)  Outcome: Met  Goal: Free from restraint(s) (Restraint for Interference with Medical Device)  Outcome: Met     Problem: Skin Integrity  Goal: Skin integrity is maintained or improved  Outcome: Progressing     Problem: Hemodynamics  Goal: Patient's hemodynamics, fluid balance and neurologic status will be stable or improve  Outcome: Progressing     Problem: Mechanical Ventilation  Goal: Safe management of artificial airway and ventilation  Outcome: Progressing  Goal: Successful weaning off mechanical ventilator, spontaneously maintains adequate gas exchange  Outcome: Progressing     Problem: Nutrition  Goal: Enteral nutrition will be maintained or improve  Outcome: Progressing  Goal: Enteral nutrition will be maintained or improve  Outcome: Progressing   The patient is Watcher - Medium risk of patient condition declining or worsening    Shift Goals  Clinical Goals: stable EEG, VSS  Patient Goals: terri  Family Goals: updates    Progress made toward(s) clinical / shift goals:  cEEG still on going, loaded with keppra VSS, family given updates at beside by neurology    Patient is not progressing towards the following goals:

## 2024-01-16 NOTE — PROGRESS NOTES
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 Pylori who transferred here with seizures requiring cEEG. Per wife, patient began exhibiting AMS on 1/11. On 1/12 patient was not improving and wife called EMS. Upon arrival of EMS, patients glucose was 22 and he was given 25 gm Dextrose with no improvement in mentation. He was brought to [facility] ED and was emergently intubated for hypoxia and airway protection. 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 Neurology was consulted and recommended 24 hour EEG which is not available at [facility].     Per epileptologist interpreting the EEG 1/16: \"Findings suggest a predisposition for seizures to arise from the left hemisphere, with no seizures observed. There is a resolution of this finding, which in the context of anti-seizure medication treatment, suggests their origin was epileptiform.      In addition, there is focal left hemispheric dysfunction relative to the right, a finding which may be seen in the setting of a structural abnormality, postictally, or in other instances that would confer left hemispheric dysfunction. Clinical correlation recommended. There is also evidence of mild to moderate diffuse cerebral dysfunction of a nonspecific etiology, with superimposed sedative/medication effects\".     Interval Problem Update  Reviewed last 24 hour events:  No seizures on cEEG. Discontinuing today  No overnight events.  's - Insulin drip protocol initiated  SR 70-90's  's. No drips, no PRN's  Afebrile  Small BM overnight  No sedation, no PRN's for pain  Neuro: Unresponsive, does not open " eyes, does not move extremities.  BLE triple flexion.  Vent day #4: APVC 14/420/8/30%  AB.46/37/106  SAT/SBT: yes/does not follow for parameters  I/O: 2200/1775  PICC, PIV, yadav  PPI, No ABX. Lovenox  Mobility 1 - not eligible     Updated wife and son at bedside and daughter over the phone.     Review of Systems  Review of Systems   Unable to perform ROS: Intubated        Vital Signs for last 24 hours   Pulse:  [79-94] 85  Resp:  [8-43] 18  BP: (124-161)/(66-99) 155/79  SpO2:  [96 %-99 %] 98 %    Hemodynamic parameters for last 24 hours       Respiratory Information for the last 24 hours  Vent Mode: APVCMV  Rate (breaths/min): 14  Vt Target (mL): 420  PEEP/CPAP: 8  MAP: 10  Control VTE (exp VT): 411    Physical Exam   Physical Exam  Vitals and nursing note reviewed.   Constitutional:       General: He is not in acute distress.     Appearance: Normal appearance. He is ill-appearing.      Interventions: He is intubated.   HENT:      Head: Normocephalic.      Nose: Nose normal.      Mouth/Throat:      Lips: Pink.      Mouth: Mucous membranes are moist.   Eyes:      Pupils: Pupils are equal, round, and reactive to light.   Cardiovascular:      Rate and Rhythm: Normal rate and regular rhythm.      Pulses: Normal pulses.      Heart sounds: Normal heart sounds.   Pulmonary:      Effort: Pulmonary effort is normal. He is intubated.      Breath sounds: No wheezing or rhonchi.   Abdominal:      General: Bowel sounds are normal.      Palpations: Abdomen is soft.   Musculoskeletal:      Right lower leg: No edema.      Left lower leg: No edema.   Skin:     General: Skin is warm and dry.   Neurological:      GCS: GCS eye subscore is 1. GCS verbal subscore is 1. GCS motor subscore is 1.      Comments: 3T  Triple flexion BLE  No spontaneous movements  BUE flaccid   Psychiatric:      Comments: Intubated         Medications  Current Facility-Administered Medications   Medication Dose Route Frequency Provider Last Rate Last Admin     insulin regular (HumuLIN R,NovoLIN R) injection  0-14 Units Intravenous Once Tammy Morton, A.P.R.N.        insulin regular (Humulin R) 100 Units in  mL Infusion  0-29 Units/hr Intravenous Continuous Tammy Morton, A.P.R.N. 4 mL/hr at 01/16/24 1458 4 Units/hr at 01/16/24 1458    dextrose 50% (D50W) injection 12.5-25 g  12.5-25 g Intravenous PRN Tammy Morton, A.P.R.N.        labetalol (Normodyne/Trandate) injection 10-20 mg  10-20 mg Intravenous Q4HRS PRN Tammy Morton, A.P.R.N.        hydrALAZINE (Apresoline) injection 10-20 mg  10-20 mg Intravenous Q4HRS PRN Tammy Morton, A.P.R.N.        Pharmacy Consult: Enteral tube insertion - review meds/change route/product selection  1 Each Other PHARMACY TO DOSE Tammy Morton, A.P.R.N.        bismuth subsalicylate (Pepto-Bismol) suspension 524 mg  30 mL Enteral Tube Q6HRS Tammy Morton, A.P.R.N.   524 mg at 01/16/24 1212    levETIRAcetam (Keppra) injection 1,000 mg  1,000 mg Intravenous Q12HRS Camilo Schroeder D.O.   1,000 mg at 01/16/24 0524    Respiratory Therapy Consult   Nebulization Continuous RT Victorina Christianson M.D.        senna-docusate (Pericolace Or Senokot S) 8.6-50 MG per tablet 2 Tablet  2 Tablet Enteral Tube BID Victorina Christianson M.D.   2 Tablet at 01/16/24 0523    And    polyethylene glycol/lytes (Miralax) Packet 1 Packet  1 Packet Enteral Tube QDAY PRN Victorina Christianson M.D.        And    magnesium hydroxide (Milk Of Magnesia) suspension 30 mL  30 mL Enteral Tube QDAY PRN Victorina Christianson M.D.        And    bisacodyl (Dulcolax) suppository 10 mg  10 mg Rectal QDAY PRN Victorina Christianson M.D.        MD Alert...ICU Electrolyte Replacement per Pharmacy   Other PHARMACY TO DOSE Victorina Christianson M.D.        lidocaine (Xylocaine) 1 % injection 2 mL  2 mL Tracheal Tube Q30 MIN PRN Victorina Christianson M.D.        ipratropium-albuterol (DUONEB) nebulizer solution  3 mL Nebulization Q2HRS PRN (RT) Victorina Christianson M.D.        metoprolol tartrate  (Lopressor) tablet 25 mg  25 mg Enteral Tube BID Victorina Christianson M.D.        enoxaparin (Lovenox) inj 40 mg  40 mg Subcutaneous DAILY AT 1800 Victorina Christianson M.D.        ondansetron (Zofran) syringe/vial injection 4 mg  4 mg Intravenous Q4HRS PRN Victorina Christianson M.D.        acetaminophen (Tylenol) tablet 650 mg  650 mg Enteral Tube Q6HRS PRN Victorina Christianson M.D.        ondansetron (Zofran ODT) dispertab 4 mg  4 mg Enteral Tube Q4HRS PRN Victorina Christianson M.D.        atorvastatin (Lipitor) tablet 80 mg  80 mg Enteral Tube Q EVENING Victorina Christianson M.D.   80 mg at 01/15/24 1730    aspirin (Asa) chewable tab 81 mg  81 mg Enteral Tube DAILY Victorina Christianson M.D.   81 mg at 01/16/24 0524    pantoprazole (Protonix) injection 40 mg  40 mg Intravenous BID Mari GARZON Latona   40 mg at 01/16/24 0524    tetracycline (Sumycin) capsule 500 mg  500 mg Enteral Tube Q6HRS Mari GARZON Latona   500 mg at 01/16/24 1212    metroNIDAZOLE (Flagyl) tablet 500 mg  500 mg Enteral Tube Q8HRS Mari LEileen Latona   500 mg at 01/16/24 1349       Fluids    Intake/Output Summary (Last 24 hours) at 1/16/2024 1549  Last data filed at 1/16/2024 1459  Gross per 24 hour   Intake 2846.25 ml   Output 1375 ml   Net 1471.25 ml       Laboratory  Recent Labs     01/14/24  1924 01/15/24  0238 01/16/24  0402   ISTATAPH 7.489 7.495 7.463   ISTATAPCO2 32.4 30.8 37.8*   ISTATAPO2 152* 84 106*   ISTATATCO2 26 25 28   UVRAMTG8CXY 100* 97 98   ISTATARTHCO3 24.7 23.8 27.1*   ISTATARTBE 2 1 3   ISTATTEMP 100.2 F 97.0 F 98.1 F   ISTATFIO2 50 30 30   ISTATSPEC Arterial Arterial Arterial   ISTATAPHTC 7.476 7.509* 7.467   ADHEEMXK2HS 158* 80 104*         Recent Labs     01/14/24  1830 01/15/24  0338 01/16/24  0430   SODIUM 138 139 136   POTASSIUM 3.5* 4.1 4.3   CHLORIDE 101 103 101   CO2 25 24 25   BUN 12 12 15   CREATININE 1.01 0.98 1.13   MAGNESIUM  --  1.6 2.0   PHOSPHORUS  --  2.8 3.5   CALCIUM 8.3* 8.4* 8.5     Recent Labs     01/14/24  1830 01/15/24  0338  01/15/24  1056 01/16/24  0430   ALTSGPT 19 18  --   --    ASTSGOT 13 17  --   --    ALKPHOSPHAT 53 48  --   --    TBILIRUBIN 0.2 0.3  --   --    PREALBUMIN  --  8.6* 8.8* 9.9*   GLUCOSE 303* 256*  --  283*     Recent Labs     01/14/24  1830 01/15/24  0338 01/16/24  0430   WBC 4.2* 7.1 5.2   NEUTSPOLYS 54.20 71.30 59.00   LYMPHOCYTES 23.70 11.40* 20.90*   MONOCYTES 18.00* 15.20* 15.50*   EOSINOPHILS 1.70 0.40 1.90   BASOPHILS 0.50 0.30 0.60   ASTSGOT 13 17  --    ALTSGPT 19 18  --    ALKPHOSPHAT 53 48  --    TBILIRUBIN 0.2 0.3  --      Recent Labs     01/14/24  1830 01/15/24  0338 01/16/24  0430   RBC 3.70* 3.44* 3.48*   HEMOGLOBIN 11.3* 10.4* 10.6*   HEMATOCRIT 33.4* 31.0* 32.5*   PLATELETCT 384 374 413   PROTHROMBTM 16.1*  --   --    INR 1.28*  --   --        Imaging  X-Ray:  I have personally reviewed the images and compared with prior images.    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  Suggest hypoglycemia due to possible sulfonylurea overdose.  -Seizure Precautions  -continue Keppra    GIB (gastrointestinal bleeding)- (present on admission)  Assessment & Plan  -EGD 1/2/2024 showed H. pylori, patient was sent home on PPI, bismuth, tetracycline, and metronidazole for 14 days  -suspected, mild  -Hgb stable  -gastric occult (+)  -PPI  -consider GI consult if worsens    On mechanically assisted ventilation (HCC)- (present on admission)  Assessment & Plan  -Intubated for low GCS, not protecting airway, and hypoxia  -Intubation date 1/12  -Ventilator dependent respiratory failure  -Modify ventilator to optimize oxygenation, acid-base balance and ventilation  -CXR as indicated: monitor lung volumes and tube/line placement  -HOB > 30  -Titrate FiO2 to keep sats greater than 92%  -Chlorhexidine  -goal  "CO2 35-40  -Daily awakening and SBT trials unless contraindicated  -ABCDEF bundle  -I am actively adjusting ventilator based on clinical indicators and ABG's      Acute encephalopathy- (present on admission)  Assessment & Plan  -Metabolic-->Concerning for hypoglycemic event at home vs anoxic brain injury vs seizures  -Keep patient awake during the day and avoid daytime naps. Remove all unnecessary lines (central lines, peripheral IVs, feeding tubes, yadav catheters). Avoid polypharmacy, frequent re-orientation, maximize family time at bedside, use glasses and hearing aids if needed, treat pain, encourage ambulation, minimize benzos/anticholinergic agents.   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 German    S/P CABG x 2- (present on admission)  Assessment & Plan  -History of 2015  -Continue statin and aspirin    Essential hypertension- (present on admission)  Assessment & Plan  -SBP goals < 160  -PRN's available    Multiple vessel coronary artery disease- (present on admission)  Assessment & Plan  -S/p Stent placement followed by CABG 2015    Type 2 diabetes mellitus without complication, without long-term current use of insulin (HCC)- (present on admission)  Assessment & Plan  -Hypoglycemia episode prompting EMS activation and admission to outside facility  -Possible sulfonylurea overdose -octreotide every 6 hours per neurology recommendations x 24 hours with no significant improvement. Stopped today  -Monitor closely for hypoglycemia episodes.  Hypoglycemia protocol  -Accu-Cheks every 6 hours and as needed  -Dietary consult for tube feeding recommendations  -try and avoid BG > 180 given risk " for glucose reperfusion injury  -Chemstick 169-273.  Starting long-acting today         VTE:  Lovenox  Ulcer: PPI  Lines: Mcbride Catheter  Ongoing indication addressed and PICC    I have performed a physical exam and reviewed and updated ROS and Plan today (1/16/2024). In review of yesterday's note (1/15/2024), there are no changes except as documented above.     Discussed patient condition and risk of morbidity and/or mortality with Family, RN, RT, Pharmacy, , neurology, and my attending Dr. Suarez.  The patient remains critically ill.  Critical care time = 110 minutes in directly providing and coordinating critical care and extensive data review.  No time overlap and excludes procedures.    Please note that this dictation was created using voice recognition software. I have made every reasonable attempt to correct obvious errors, but there may be errors of grammar and possibly content that I did not discover before finalizing the note.    YOLANDA Vargas.

## 2024-01-17 NOTE — CARE PLAN
Problem: Knowledge Deficit - Standard  Goal: Patient and family/care givers will demonstrate understanding of plan of care, disease process/condition, diagnostic tests and medications  Outcome: Progressing     Problem: Skin Integrity  Goal: Skin integrity is maintained or improved  Outcome: Progressing     Problem: Hemodynamics  Goal: Patient's hemodynamics, fluid balance and neurologic status will be stable or improve  Outcome: Progressing     Problem: Nutrition  Goal: Patient's nutritional and fluid intake will be adequate or improve  Outcome: Met   The patient is Watcher - Medium risk of patient condition declining or worsening    Shift Goals  Clinical Goals: EEG d/c  Patient Goals: terri  Family Goals: updates    Progress made toward(s) clinical / shift goals:  EEG D/C'd, family given updates by PHYLLIS monge and neurologist at bedside    Patient is not progressing towards the following goals:

## 2024-01-17 NOTE — WOUND TEAM
[facility]  Inpatient Services  Initial Wound and Skin Care Evaluation    Admission Date: 1/14/2024     Last order of IP CONSULT TO WOUND CARE was found on 1/14/2024 from Hospital Encounter on 1/14/2024     HPI, PMH, SH: Reviewed    Past Surgical History:   Procedure Laterality Date    MULTIPLE CORONARY ARTERY BYPASS ENDO VEIN HARVEST  10/6/2015    Procedure: MULTIPLE CORONARY ARTERY BYPASS ENDO VEIN HARVEST X2;  Surgeon: Malcolm Lynn M.D.;  Location: SURGERY [facility];  Service:     RECOVERY  10/2/2015    Procedure: CATH LAB [facility] WITH POSSIBLE WIEDENBECK;  Surgeon: [facility];  Location: SURGERY PRE-POST PROC [facility];  Service:     OTHER CARDIAC SURGERY  1/19/2012    stent    STENT PLACEMENT       Social History     Tobacco Use    Smoking status: Never    Smokeless tobacco: Never   Substance Use Topics    Alcohol use: Yes     Comment: occ     No chief complaint on file.    Diagnosis: Hypoglycemia [E16.2]    Unit where seen by Wound Team: R116/00     WOUND CONSULT RELATED TO:  sacrum, L 2nd toe    WOUND TEAM PLAN OF CARE - Frequency of Follow-up:   Nursing to follow dressing orders written for wound care. Contact wound team if area fails to progress, deteriorates or with any questions/concerns if something comes up before next scheduled follow up (See below as to whether wound is following and frequency of wound follow up)   Not following, consult as needed  - sacrum, L 2nd toe    WOUND HISTORY:   Pt transferred from [facility] . Wounds POA to sacrum and L 2nd toe.       WOUND ASSESSMENT/LDA  Wound 01/14/24 Pressure Injury Sacrum POA DTI (Active)   Date First Assessed/Time First Assessed: 01/14/24 1750   Present on Original Admission: Yes  Primary Wound Type: Pressure Injury  Location: Sacrum  Wound Description (Comments): POA DTI  L IT                                     Assessments 1/16/2024 12:00 PM   Site Assessment Red;Light Purple   Periwound Assessment  Red   Margins Attached edges   Closure Open to air   Drainage Amount None   Treatments Cleansed;Nonselective debridement;Offloading   Wound Cleansing Normal Saline Irrigation   Periwound Protectant Not Applicable   Dressing Status Intact   Dressing Changed Observed   Dressing Cleansing/Solutions Not Applicable   Dressing Options Offloading Dressing - Sacral   Dressing Change/Treatment Frequency Every 72 hrs, and As Needed   NEXT Dressing Change/Treatment Date 01/17/24   NEXT Weekly Photo (Inpatient Only) 01/21/24   Wound Team Following Not following   Pressure Injury Stage Deep Tissue   Wound Length (cm) 1.5 cm   Wound Width (cm) 2 cm   Wound Surface Area (cm^2) 3 cm^2   Shape irregular       Wound 01/14/24 Toe, 2nd Anterior Left (Active)   Date First Assessed/Time First Assessed: 01/14/24 1750   Present on Original Admission: Yes  Location: Toe, 2nd  Wound Orientation: Anterior  Laterality: Left                                  Assessments 1/16/2024 12:00 PM   Site Assessment Red   Periwound Assessment Intact   Margins Defined edges;Attached edges   Closure Open to air   Drainage Amount None   Treatments Cleansed;Nonselective debridement   Wound Cleansing Normal Saline Irrigation   Periwound Protectant Not Applicable   Dressing Status Open to Air   NEXT Weekly Photo (Inpatient Only) 01/21/24   Wound Team Following Not following   Wound Length (cm) 0.3 cm   Wound Width (cm) 0.3 cm   Wound Surface Area (cm^2) 0.09 cm^2   Shape circular        Vascular:    ALYSE:   No results found.    Lab Values:    Lab Results   Component Value Date/Time    WBC 5.2 01/16/2024 04:30 AM    RBC 3.48 (L) 01/16/2024 04:30 AM    HEMOGLOBIN 10.6 (L) 01/16/2024 04:30 AM    HEMATOCRIT 32.5 (L) 01/16/2024 04:30 AM    CREACTPROT 10.71 (H) 01/16/2024 04:30 AM    HBA1C 8.1 (A) 10/19/2023 04:06 PM    HBA1C 9.4 (H) 10/05/2015 03:01 PM         Culture Results show:  No results found for this or any previous visit (from the past 720 hour(s)).    Pain  Level/Medicated:  None, Tolerated without pain medication       INTERVENTIONS BY WOUND TEAM:  Chart and images reviewed. Discussed with bedside RN. All areas of concern (based on picture review, LDA review and discussion with bedside RN) have been thoroughly assessed. Documentation of areas based on significant findings. This RN in to assess patient. Performed standard wound care which includes appropriate positioning, dressing removal and non-selective debridement. Pictures and measurements obtained weekly if/when required.    Wound:  L IT  Preparation for Dressing removal: Removed without difficulty  Cleansed/Non-selectively Debrided with:  Normal Saline and Gauze  Avril wound: Cleansed with Normal Saline and Gauze, Prepped with N/A  Primary Dressing:  sacral offloading drsg     Wound:  L 2nd toe  Preparation for Dressing removal: Open to air  Cleansed/Non-selectively Debrided with:  Normal Saline and Gauze  Avril wound: Cleansed with Normal Saline and Gauze, Prepped with N/A  Primary Dressing:  JAGJIT    Advanced Wound Care Discharge Planning  Number of Clinicians necessary to complete wound care: 2, one to turn  Is patient requiring IV pain medications for dressing changes:  No   Length of time for dressing change 30 min. (This does not include chart review, pre-medication time, set up, clean up or time spent charting.)    Interdisciplinary consultation: Patient, Bedside RN (Aliya)    EVALUATION / RATIONALE FOR TREATMENT:     Date:  01/16/24  Wound Status:  Initial evaluation    Pt has POA DTI to L IT and sacrococcygeal is red and slowly blanching. Offloading drsg helps protect against the extrinsic forces of pressure, shear and friction and manages microclimate of wound bed.   L 2nd toe dorsal with scab. No s/s of infection visible to toe. Pt is diabetic so left scab dry and intact.          Goals: Steady decrease in wound area and depth weekly.    NURSING PLAN OF CARE ORDERS:  RN Prevention Protocol    NUTRITION  RECOMMENDATIONS   Wound Team Recommendations:  N/A    DIET ORDERS (From admission to next 24h)       Start     Ordered    01/15/24 1322  Diet: Diet Tube Feed; Formula: Glucerna; Glucerna: Glucerna RTH; Goal Rate (mL/Hour): 65; Duration: 24 HR  ALL MEALS        Comments: Start at 25mL/hr and advance per protocol to goal rate   Question Answer Comment   Diet Diet Tube Feed    Formula: Glucerna    Glucerna: Glucerna RTH    Goal Rate (mL/Hour) 65    Route Enteral Tube    Duration 24 HR        01/15/24 1321    01/14/24 1809  Diet NPO Restrict to: Strict (okay to receive meds through the NG/OG tube)  ALL MEALS        Question Answer Comment   Type: Now    Diet NPO Restrict to: Strict okay to receive meds through the NG/OG tube       01/14/24 1809                    PREVENTATIVE INTERVENTIONS:    Q shift [facility] - performed per nursing policy  Q shift pressure point assessments - performed per nursing policy    Surface/Positioning  ICU Low Airloss - Currently in Place  Reposition q 2 hours - Currently in Place  TAPs Turning system - Currently in Place    Offloading/Redistribution  Sacral offloading dressing (Silicone dressing) - Currently in Place      Respiratory  Anchorfast - Currently in Place    Containment/Moisture Prevention    Mcbride Catheter - Currently in Place    Anticipated discharge plans:  Back to [facility]          Vac Discharge Needs:  Vac Discharge plan is purely a recommendation from wound team and not a requirement for discharge unless otherwise stated by physician.  Not Applicable Pt not on a wound vac

## 2024-01-17 NOTE — DOCUMENTATION QUERY
[facility]                                                                       Query Response Note      PATIENT:               EARLE ALBERT  ACCT #:                  [id number]  MRN:                     [id number]  :                      1950  ADMIT DATE:       2024 5:47 PM  DISCH DATE:          RESPONDING  PROVIDER #:        [id number]           QUERY TEXT:    Documentation in the medical record indicates that this patient is being treated for ulcer of the following site(s):    Can the diagnosis of ulcer be further clarified as to type/etiology of the wound, location, and if present on admission (POA) based on the clinical indicators and treatment?      The patient's Clinical Indicators include:  Wound care consult has documented pressure injury DTI to the sacrum Present On Admission.  Note to be red/light purple with attached edges without drainage noted.  Also noted wound to the left toe Present On Admission   Treatment:  cleansed with normal saline irrigation, nonselective debridement; offloading to be treated q 72 hours as needed.  Risk:  DM type 2 with coma due to prolonged hypoglycemia, wound risk of progression to higher stage.    Thank you,  MAGGIE Moser RN   Clinical Documentation Integrity   [facility]   [email or web address]  connect via [email or web address]  [phone]  Options provided:   -- Pressure Ulcer/DTI of the sacrum present on admission   -- Other explanation, (please specify other explanation)      Query created by: Natalia Shaikh on 2024 10:58 AM    RESPONSE TEXT:    Pressure Ulcer/DTI of the sacrum present on admission          Electronically signed by:  GARY GALAVIZ MD 2024 11:34 AM

## 2024-01-17 NOTE — PROGRESS NOTES
Neurology Follow-up  Neurohospitalist Service, [facility] Neurosciences    Referring Physician: Juan Jose Suarez M.D.    No chief complaint on file.      Interval history:     Review of systems: In addition to what is detailed in the HPI above, all other systems reviewed and are negative.    Past Medical History:    has a past medical history of CAD (coronary artery disease), Diabetes, Diabetes (HCC), F/u of acute inferolateral myocardial infarction (HCC) (2/7/2012), Hyperlipidemia, Hypertension, and Myocardial infarct (HCC) (1/19/2012).    FHx:  family history includes Cancer in his mother; Diabetes in his father and mother; Heart Disease in his father and sister.    SHx:   reports that he has never smoked. He has never used smokeless tobacco. He reports current alcohol use. He reports that he does not use drugs.    Allergies:  No Known Allergies    Medications:    Current Facility-Administered Medications:     levETIRAcetam (Keppra) tablet 1,000 mg, 1,000 mg, Enteral Tube, BID, Juan Jose Suarez M.D.    insulin regular (HumuLIN R,NovoLIN R) injection, 0-14 Units, Intravenous, Once, Tammy Morton A.P.R.N.    insulin regular (Humulin R) 100 Units in  mL Infusion, 0-29 Units/hr, Intravenous, Continuous, Tammy Morton A.P.R.N., Held at 01/17/24 1108    dextrose 50% (D50W) injection 12.5-25 g, 12.5-25 g, Intravenous, PRN, Tammy Morton A.P.R.N.    labetalol (Normodyne/Trandate) injection 10-20 mg, 10-20 mg, Intravenous, Q4HRS PRN, Tammy Morton A.P.R.N., 10 mg at 01/16/24 1612    hydrALAZINE (Apresoline) injection 10-20 mg, 10-20 mg, Intravenous, Q4HRS PRN, Tammy Morton, A.P.R.N.    Pharmacy Consult: Enteral tube insertion - review meds/change route/product selection, 1 Each, Other, PHARMACY TO DOSE, DELON Vargas.P.R.N.    bismuth subsalicylate (Pepto-Bismol) suspension 524 mg, 30 mL, Enteral Tube, Q6HRS, ALEXANDRE VargasP.REileenN., 524 mg at 01/17/24 0857    Respiratory Therapy Consult,  , Nebulization, Continuous RT, Victorina Christianson M.D.    [Held by provider] senna-docusate (Pericolace Or Senokot S) 8.6-50 MG per tablet 2 Tablet, 2 Tablet, Enteral Tube, BID, 2 Tablet at 01/16/24 1800 **AND** [Held by provider] polyethylene glycol/lytes (Miralax) Packet 1 Packet, 1 Packet, Enteral Tube, QDAY PRN **AND** [Held by provider] magnesium hydroxide (Milk Of Magnesia) suspension 30 mL, 30 mL, Enteral Tube, QDAY PRN **AND** [Held by provider] bisacodyl (Dulcolax) suppository 10 mg, 10 mg, Rectal, QDAY PRN, Victorina Christianson M.D.    MD Alert...ICU Electrolyte Replacement per Pharmacy, , Other, PHARMACY TO DOSE, Victorina Christianson M.D.    lidocaine (Xylocaine) 1 % injection 2 mL, 2 mL, Tracheal Tube, Q30 MIN PRN, Victorina Christianson M.D.    ipratropium-albuterol (DUONEB) nebulizer solution, 3 mL, Nebulization, Q2HRS PRN (RT), Victorina Chrisitanson M.D.    metoprolol tartrate (Lopressor) tablet 25 mg, 25 mg, Enteral Tube, BID, Victorina Christianson M.D., 25 mg at 01/17/24 0508    enoxaparin (Lovenox) inj 40 mg, 40 mg, Subcutaneous, DAILY AT 1800, Victorina Christianson M.D., 40 mg at 01/16/24 1717    ondansetron (Zofran) syringe/vial injection 4 mg, 4 mg, Intravenous, Q4HRS PRN, Victorina Christianson M.D.    acetaminophen (Tylenol) tablet 650 mg, 650 mg, Enteral Tube, Q6HRS PRN, Victorina Christianson M.D.    ondansetron (Zofran ODT) dispertab 4 mg, 4 mg, Enteral Tube, Q4HRS PRN, Victorina Christianson M.D.    atorvastatin (Lipitor) tablet 80 mg, 80 mg, Enteral Tube, Q EVENING, Victorina Christianson M.D., 80 mg at 01/16/24 1718    aspirin (Asa) chewable tab 81 mg, 81 mg, Enteral Tube, DAILY, Victorina Christianson M.D., 81 mg at 01/17/24 0508    pantoprazole (Protonix) injection 40 mg, 40 mg, Intravenous, BID, Mari GARZON Latona, 40 mg at 01/17/24 0515    tetracycline (Sumycin) capsule 500 mg, 500 mg, Enteral Tube, Q6HRS, Mari GARZON Latona, 500 mg at 01/17/24 1209    metroNIDAZOLE (Flagyl) tablet 500 mg, 500 mg, Enteral Tube, Q8HRS, Mari Arora,  "500 mg at 01/17/24 0507    Physical Examination:     General: Patient is awake and in no acute distress  Eye: Examination of optic disks not indicated at this time given acuity of consult  Neck: There is normal range of motion  CV: regular rate   Extremities:  clear, dry, intact, without peripheral edema    NEUROLOGICAL EXAM:     BP (!) 147/80   Pulse 89   Temp 37.2 °C (99 °F) (Bladder)   Resp (!) 29   Ht 1.702 m (5' 7.01\")   Wt 75.1 kg (165 lb 9.1 oz)   SpO2 99%   BMI 25.93 kg/m²       Mental status: Sedated, intubated.   Speech and language:no response to voice/pain. Unable to produce speech  Cranial Nerves: PEERL, unable to eval VF, no obvious facial asymmetry; unable to test uvular lift and tongue protrusion, SCM and trap; Hearing intact grossly.  Motor exam: Normal bulk and tone. No movement of Bilateral UE to pain; triple flexion to plantar stimulation bilaterally - more briskly on the right than left.   Sensory exam: some response to pain in lower extremity      Objective Data:    Labs:  Lab Results   Component Value Date/Time    PROTHROMBTM 16.1 (H) 01/14/2024 06:30 PM    INR 1.28 (H) 01/14/2024 06:30 PM      Lab Results   Component Value Date/Time    WBC 5.2 01/17/2024 04:20 AM    RBC 3.46 (L) 01/17/2024 04:20 AM    HEMOGLOBIN 10.4 (L) 01/17/2024 04:20 AM    HEMATOCRIT 31.2 (L) 01/17/2024 04:20 AM    MCV 90.2 01/17/2024 04:20 AM    MCH 30.1 01/17/2024 04:20 AM    MCHC 33.3 01/17/2024 04:20 AM    MPV 8.1 (L) 01/17/2024 04:20 AM    NEUTSPOLYS 59.20 01/17/2024 04:20 AM    LYMPHOCYTES 20.20 (L) 01/17/2024 04:20 AM    MONOCYTES 14.60 (H) 01/17/2024 04:20 AM    EOSINOPHILS 2.10 01/17/2024 04:20 AM    BASOPHILS 0.80 01/17/2024 04:20 AM      Lab Results   Component Value Date/Time    SODIUM 137 01/17/2024 04:20 AM    POTASSIUM 3.8 01/17/2024 04:20 AM    CHLORIDE 100 01/17/2024 04:20 AM    CO2 28 01/17/2024 04:20 AM    GLUCOSE 205 (H) 01/17/2024 04:20 AM    BUN 17 01/17/2024 04:20 AM    CREATININE 1.00 " "01/17/2024 04:20 AM    BUNCREATRAT 11 12/15/2023 11:36 AM    BUNCREATRAT 16 01/31/2023 04:27 AM      Lab Results   Component Value Date/Time    CHOLSTRLTOT 147 01/31/2023 04:27 AM    LDL Comment 07/16/2018 08:37 AM    HDL 30 (L) 01/31/2023 04:27 AM    TRIGLYCERIDE 210 (H) 01/31/2023 04:27 AM       Lab Results   Component Value Date/Time    ALKPHOSPHAT 48 01/15/2024 03:38 AM    ASTSGOT 17 01/15/2024 03:38 AM    ALTSGPT 18 01/15/2024 03:38 AM    TBILIRUBIN 0.3 01/15/2024 03:38 AM        Imaging/Testing:    I interpreted and/or reviewed the patient's neuroimaging    DX-CHEST-PORTABLE (1 VIEW)   Final Result         1.  No acute cardiopulmonary disease.      DX-CHEST-PORTABLE (1 VIEW)   Final Result         1.  No acute cardiopulmonary disease.      DX-CHEST-PORTABLE (1 VIEW)   Final Result         1.  No acute cardiopulmonary disease.      DX-CHEST-PORTABLE (1 VIEW)   Final Result      OUTSIDE IMAGES-CT HEAD   Final Result      OUTSIDE IMAGES-DX CHEST   Final Result      OUTSIDE IMAGES-DX CHEST   Final Result      MR-FOREIGN FILM MRI   Final Result          Impression and Recommendations:    # Unresponsiveness  #Coma after prolonged hypoglycemia  Prior to presentation at our facility, the patient presented to an outside facility after being found to have a glucose of 22.  MRI at outside facility reported to be concerning for \"likely hypoglycemic encephalopathy\".Patient's current mentation is likely medication induced (patient is on 70 units of insulin at home and glimepiride 4 mg twice daily).  On day 3 patient was started on octreotide for possible sulfonylurea overdose; octreotide was stopped on 1/16/2024, there is no significant improvement or change in patient's mentation.  Patient continues to have poor localization to pain after day 5 of hospitalization; prognosis remains guarded.  - Continue neurochecks  - Avoid blood glucose level > 180, given the risk of glucose reperfusion injury  - Repeat brain MRI in 1 to 2 " days to assess/evaluate previously identified brain lesions  - Keppra 1000 mg twice daily  - Seizure precautions      Yogesh Carmen MD  Neurohospitalist, Acute Care Services          Please note that this dictation was created using voice recognition software.  I have made every reasonable attempt to correct obvious errors, but I expect that there are errors of grammar and possibly content that I did not discover before finalizing the note.

## 2024-01-17 NOTE — CARE PLAN
Problem: Ventilation  Goal: Ability to achieve and maintain unassisted ventilation or tolerate decreased levels of ventilator support  Description: Target End Date:  4 days     Document on Vent flowsheet    1.  Support and monitor invasive and noninvasive mechanical ventilation  2.  Monitor ventilator weaning response  3.  Perform ventilator associated pneumonia prevention interventions  4.  Manage ventilation therapy by monitoring diagnostic test results  Outcome: Not Met     Ventilator Daily Summary    Vent Day #4  Airway: 7.5 @ 24    Ventilator settings: APV/CMV -- 14 / 420 / 8+ / 30%   Weaning trials: None  Respiratory Procedures: None     Plan: Continue current ventilator settings and wean mechanical ventilation as tolerated per physician orders.

## 2024-01-17 NOTE — CARE PLAN
The patient is Watcher - Medium risk of patient condition declining or worsening    Shift Goals  Clinical Goals: Stable blood sugar, rest  Patient Goals: ANDREW  Family Goals: updates    Progress made toward(s) clinical / shift goals:        Problem: Pain - Standard  Goal: Alleviation of pain or a reduction in pain to the patient’s comfort goal  Outcome: Progressing    Problem: Skin Integrity  Goal: Skin integrity is maintained or improved  Outcome: Progressing     Problem: Bowel Elimination  Goal: Establish and maintain regular bowel function  Outcome: Progressing

## 2024-01-17 NOTE — DOCUMENTATION QUERY
[facility]                                                                       Query Response Note      PATIENT:               EARLE ALBERT  ACCT #:                  [id number]  MRN:                     [id number]  :                      1950  ADMIT DATE:       2024 5:47 PM  DISCH DATE:          RESPONDING  PROVIDER #:        [id number]           QUERY TEXT:    Based on your medical judgement can you please confirm the noted clinical finding?          The patient's Clinical Indicators include:  Wound care consult has documented pressure injury DTI to the sacrum Present On Admission.  Note to be red/light purple with attached edges without drainage noted.  Also noted wound to the left toe Present On Admission   Treatment:  cleansed with normal saline irrigation, nonselective debridement; offloading to be treated q 72 hours as needed.  Risk:  DM type 2 with coma due to prolonged hypoglycemia, wound risk of progression to higher stage.    Thank you,  MAGGIE Moser RN   Clinical Documentation Integrity   [facility]   [email or web address]  [email or web address]  [phone]  Options provided:   -- Pressure Injury Sacrum Stage 3  present on admission   -- Other explanation, (please specify other explanation)      Query created by: Natalia Shaikh on 2024 9:39 AM    RESPONSE TEXT:    Pressure Injury Sacrum Stage 3 present on admission          Electronically signed by:  GARY GALAVIZ MD 2024 10:22 AM

## 2024-01-17 NOTE — PROGRESS NOTES
"Critical Care Progress Note    Date of admission  1/14/2024    Chief Complaint  Altered mental status and seizure-like activity.    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 Pylori who transferred here with seizures requiring cEEG. Per wife, patient began exhibiting AMS on 1/11. On 1/12 patient was not improving and wife called EMS. Upon arrival of EMS, patients glucose was 22 and he was given 25 gm Dextrose with no improvement in mentation. He was brought to [facility] ED and was emergently intubated for hypoxia and airway protection. 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 Neurology was consulted and recommended 24 hour EEG which is not available at [facility].     Per epileptologist interpreting the EEG 1/16: \"Findings suggest a predisposition for seizures to arise from the left hemisphere, with no seizures observed. There is a resolution of this finding, which in the context of anti-seizure medication treatment, suggests their origin was epileptiform.      In addition, there is focal left hemispheric dysfunction relative to the right, a finding which may be seen in the setting of a structural abnormality, postictally, or in other instances that would confer left hemispheric dysfunction. Clinical correlation recommended. There is also evidence of mild to moderate diffuse cerebral dysfunction of a nonspecific etiology, with superimposed sedative/medication effects\".     1/16 - stopped cEEG. Vent day  #4. No purposeful movements, triple flexion response BLE only. (+) cough/gag/corneals. DNR, I OK.    Interval Problem Update  Reviewed last 24 hour events:  No overnight events.  CS's 160-200's. Actively titrating insulin drip.  Tmax " 100  SR 80's  -140's  Neuro: (+) cough, (+) gag, (+) corneal. Triple flexion BLE. Does not spontaneously move. Does not follow commands. PERRLA 4mm.  Vent day #5: APV/CMV 14/420/8/30%  SAT/SBT: yes/yes. Stopped for apnea episodes. Patient does trigger breaths.  Rectal Tube for diarrhea - hold stool softeners.  NPO with TF's @ goal.   I/O: 2400/2700  Mobility 1 - not eligible to advance     Review of Systems  Review of Systems   Unable to perform ROS: Intubated        Vital Signs for last 24 hours   Temp:  [36.6 °C (97.9 °F)-37.8 °C (100 °F)] 37.2 °C (99 °F)  Pulse:  [78-91] 89  Resp:  [14-29] 29  BP: (131-175)/(65-85) 147/80  SpO2:  [97 %-99 %] 99 %    Hemodynamic parameters for last 24 hours       Respiratory Information for the last 24 hours  Vent Mode: APVCMV  Rate (breaths/min): 14  Vt Target (mL): 420  PEEP/CPAP: 8  MAP: 13  Control VTE (exp VT): 462    Physical Exam   Physical Exam  Vitals and nursing note reviewed.   Constitutional:       General: He is not in acute distress.     Appearance: Normal appearance. He is ill-appearing.      Interventions: He is intubated.   HENT:      Head: Normocephalic.      Nose: Nose normal.      Mouth/Throat:      Lips: Pink.      Mouth: Mucous membranes are moist.   Eyes:      Pupils: Pupils are equal, round, and reactive to light.   Cardiovascular:      Rate and Rhythm: Normal rate and regular rhythm.      Pulses: Normal pulses.      Heart sounds: Normal heart sounds.   Pulmonary:      Effort: Pulmonary effort is normal. He is intubated.      Breath sounds: No wheezing or rhonchi.   Abdominal:      General: Bowel sounds are normal.      Palpations: Abdomen is soft.   Musculoskeletal:      Right lower leg: No edema.      Left lower leg: No edema.   Skin:     General: Skin is warm and dry.   Neurological:      GCS: GCS eye subscore is 1. GCS verbal subscore is 1. GCS motor subscore is 1.      Comments: 3T  Triple flexion BLE  No spontaneous movements  BUE flaccid    Psychiatric:      Comments: Intubated         Medications  Current Facility-Administered Medications   Medication Dose Route Frequency Provider Last Rate Last Admin    levETIRAcetam (Keppra) tablet 1,000 mg  1,000 mg Enteral Tube BID Juan Jose Suarez M.D.        insulin regular (HumuLIN R,NovoLIN R) injection  3-14 Units Subcutaneous Q6HRS Tammy Morton, A.P.R.N.        And    dextrose 50% (D50W) injection 25 g  25 g Intravenous Q15 MIN PRN Tammy Morton, A.P.R.N.        labetalol (Normodyne/Trandate) injection 10-20 mg  10-20 mg Intravenous Q4HRS PRN Tammy Morton, A.P.R.N.   10 mg at 01/16/24 1612    hydrALAZINE (Apresoline) injection 10-20 mg  10-20 mg Intravenous Q4HRS PRN Tammy Morton, A.P.R.N.        Pharmacy Consult: Enteral tube insertion - review meds/change route/product selection  1 Each Other PHARMACY TO DOSE Tammy Morton, A.P.R.N.        bismuth subsalicylate (Pepto-Bismol) suspension 524 mg  30 mL Enteral Tube Q6HRS Tammy Morton, A.P.R.N.   524 mg at 01/17/24 1209    Respiratory Therapy Consult   Nebulization Continuous RT Victorina Christianson M.D.        [Held by provider] senna-docusate (Pericolace Or Senokot S) 8.6-50 MG per tablet 2 Tablet  2 Tablet Enteral Tube BID Victorina Christianson M.D.   2 Tablet at 01/16/24 1800    And    [Held by provider] polyethylene glycol/lytes (Miralax) Packet 1 Packet  1 Packet Enteral Tube QDAY PRN Victorina Christianson M.D.        And    [Held by provider] magnesium hydroxide (Milk Of Magnesia) suspension 30 mL  30 mL Enteral Tube QDAY PRN Victorina Christianson M.D.        And    [Held by provider] bisacodyl (Dulcolax) suppository 10 mg  10 mg Rectal QDAY PRN Victorina Christianson M.D. MD Alert...ICU Electrolyte Replacement per Pharmacy   Other PHARMACY TO DOSE Victorina Christianson M.D.        lidocaine (Xylocaine) 1 % injection 2 mL  2 mL Tracheal Tube Q30 MIN PRN Victorina Christianson M.D.        ipratropium-albuterol (DUONEB) nebulizer solution  3 mL Nebulization  Q2HRS PRN (RT) Victorina Christianson M.D.        metoprolol tartrate (Lopressor) tablet 25 mg  25 mg Enteral Tube BID Victorina Christianson M.D.   25 mg at 01/17/24 0508    enoxaparin (Lovenox) inj 40 mg  40 mg Subcutaneous DAILY AT 1800 Victorina Christianson M.D.   40 mg at 01/16/24 1717    ondansetron (Zofran) syringe/vial injection 4 mg  4 mg Intravenous Q4HRS PRN Victorina Christianson M.D.        acetaminophen (Tylenol) tablet 650 mg  650 mg Enteral Tube Q6HRS PRN Victorina Christianson M.D.        ondansetron (Zofran ODT) dispertab 4 mg  4 mg Enteral Tube Q4HRS PRN Victorina Christianson M.D.        atorvastatin (Lipitor) tablet 80 mg  80 mg Enteral Tube Q EVENING Victorina Christianson M.D.   80 mg at 01/16/24 1718    aspirin (Asa) chewable tab 81 mg  81 mg Enteral Tube DAILY Victorina Christianson M.D.   81 mg at 01/17/24 0508    pantoprazole (Protonix) injection 40 mg  40 mg Intravenous BID Mari GARZON Latona   40 mg at 01/17/24 0515    tetracycline (Sumycin) capsule 500 mg  500 mg Enteral Tube Q6HRS Mari GARZON Latona   500 mg at 01/17/24 1209    metroNIDAZOLE (Flagyl) tablet 500 mg  500 mg Enteral Tube Q8HRS Mari GARZON Latona   500 mg at 01/17/24 0507       Fluids    Intake/Output Summary (Last 24 hours) at 1/17/2024 1303  Last data filed at 1/17/2024 1000  Gross per 24 hour   Intake 1821.91 ml   Output 2975 ml   Net -1153.09 ml       Laboratory  Recent Labs     01/14/24  1924 01/15/24  0238 01/16/24  0402   ISTATAPH 7.489 7.495 7.463   ISTATAPCO2 32.4 30.8 37.8*   ISTATAPO2 152* 84 106*   ISTATATCO2 26 25 28   ZSZCFXW4KTU 100* 97 98   ISTATARTHCO3 24.7 23.8 27.1*   ISTATARTBE 2 1 3   ISTATTEMP 100.2 F 97.0 F 98.1 F   ISTATFIO2 50 30 30   ISTATSPEC Arterial Arterial Arterial   ISTATAPHTC 7.476 7.509* 7.467   VQSFNEXM9ES 158* 80 104*         Recent Labs     01/15/24  0338 01/16/24  0430 01/17/24  0420   SODIUM 139 136 137   POTASSIUM 4.1 4.3 3.8   CHLORIDE 103 101 100   CO2 24 25 28   BUN 12 15 17   CREATININE 0.98 1.13 1.00   MAGNESIUM 1.6 2.0 2.0    PHOSPHORUS 2.8 3.5 3.7   CALCIUM 8.4* 8.5 8.8     Recent Labs     01/14/24  1830 01/15/24  0338 01/15/24  1056 01/16/24 0430 01/17/24 0420   ALTSGPT 19 18  --   --   --    ASTSGOT 13 17  --   --   --    ALKPHOSPHAT 53 48  --   --   --    TBILIRUBIN 0.2 0.3  --   --   --    PREALBUMIN  --  8.6* 8.8* 9.9*  --    GLUCOSE 303* 256*  --  283* 205*     Recent Labs     01/14/24  1830 01/15/24  0338 01/16/24  0430 01/17/24  0420   WBC 4.2* 7.1 5.2 5.2   NEUTSPOLYS 54.20 71.30 59.00 59.20   LYMPHOCYTES 23.70 11.40* 20.90* 20.20*   MONOCYTES 18.00* 15.20* 15.50* 14.60*   EOSINOPHILS 1.70 0.40 1.90 2.10   BASOPHILS 0.50 0.30 0.60 0.80   ASTSGOT 13 17  --   --    ALTSGPT 19 18  --   --    ALKPHOSPHAT 53 48  --   --    TBILIRUBIN 0.2 0.3  --   --      Recent Labs     01/14/24  1830 01/15/24  0338 01/16/24  0430 01/17/24  0420   RBC 3.70* 3.44* 3.48* 3.46*   HEMOGLOBIN 11.3* 10.4* 10.6* 10.4*   HEMATOCRIT 33.4* 31.0* 32.5* 31.2*   PLATELETCT 384 374 413 399   PROTHROMBTM 16.1*  --   --   --    INR 1.28*  --   --   --        Imaging  No new imaging today.    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  Suggest hypoglycemia due to possible sulfonylurea overdose.  -Seizure Precautions  -continue Keppra    GIB (gastrointestinal bleeding)- (present on admission)  Assessment & Plan  -EGD 1/2/2024 showed H. pylori, patient was sent home on PPI, bismuth, tetracycline, and metronidazole for 14 days  -suspected, mild  -Hgb stable  -gastric occult (+)  -PPI  -consider GI consult if worsens    On mechanically assisted ventilation (HCC)- (present on admission)  Assessment & Plan  -Intubated for low GCS, not protecting airway, and hypoxia  -Intubation date 1/12  -Ventilator dependent respiratory failure  -Modify ventilator to  "optimize oxygenation, acid-base balance and ventilation  -CXR as indicated: monitor lung volumes and tube/line placement  -HOB > 30  -Titrate FiO2 to keep sats greater than 92%  -Chlorhexidine  -goal CO2 35-40  -Daily awakening and SBT trials unless contraindicated  -ABCDEF bundle  -I am actively adjusting ventilator based on clinical indicators and ABG's      Acute encephalopathy- (present on admission)  Assessment & Plan  -Metabolic-->Concerning for hypoglycemic event at home vs anoxic brain injury vs seizures  -Keep patient awake during the day and avoid daytime naps. Remove all unnecessary lines (central lines, peripheral IVs, feeding tubes, yadav catheters). Avoid polypharmacy, frequent re-orientation, maximize family time at bedside, use glasses and hearing aids if needed, treat pain, encourage ambulation, minimize benzos/anticholinergic agents.   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 German    S/P CABG x 2- (present on admission)  Assessment & Plan  -History of 2015  -Continue statin and aspirin    Essential hypertension- (present on admission)  Assessment & Plan  -SBP goals < 160  -PRN's available    Multiple vessel coronary artery disease- (present on admission)  Assessment & Plan  -S/p Stent placement followed by CABG 2015    Type 2 diabetes mellitus without complication, without long-term current use of insulin (HCC)- (present on admission)  Assessment & Plan  -Hypoglycemia episode prompting EMS activation and admission to outside facility  -Possible sulfonylurea overdose -octreotide every 6 hours per neurology recommendations x 24 hours with no significant improvement. Stopped " 1/16  -Hypoglycemia protocol  -Accu-Cheks every 6 hours and as needed  -Dietary consult for tube feeding recommendations  -try and avoid BG > 180 given risk for glucose reperfusion injury  -Insulin drip 140-180 protocol         VTE:  Lovenox  Ulcer: PPI  Lines: Mcbride Catheter  Ongoing indication addressed and PICC    I have performed a physical exam and reviewed and updated ROS and Plan today (1/17/2024). In review of yesterday's note (1/16/2024), there are no changes except as documented above.     Discussed patient condition and risk of morbidity and/or mortality with RN, RT, Pharmacy, , neurology, and my attending Dr. Suarez.  The patient remains critically ill.  Critical care time = 50 minutes in directly providing and coordinating critical care and extensive data review.  No time overlap and excludes procedures.    Please note that this dictation was created using voice recognition software. I have made every reasonable attempt to correct obvious errors, but there may be errors of grammar and possibly content that I did not discover before finalizing the note.    YOLANDA Vargas.

## 2024-01-18 NOTE — PROGRESS NOTES
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 Pylori who transferred here with seizures requiring cEEG. Per wife, patient began exhibiting AMS on 1/11. On 1/12 patient was not improving and wife called EMS. Upon arrival of EMS, patients glucose was 22 and he was given 25 gm Dextrose with no improvement in mentation. He was brought to [facility] ED and was emergently intubated for hypoxia and airway protection. 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 Neurology was consulted and recommended 24 hour EEG which is not available at [facility].     Per epileptologist interpreting the EEG 1/16: \"Findings suggest a predisposition for seizures to arise from the left hemisphere, with no seizures observed. There is a resolution of this finding, which in the context of anti-seizure medication treatment, suggests their origin was epileptiform.      In addition, there is focal left hemispheric dysfunction relative to the right, a finding which may be seen in the setting of a structural abnormality, postictally, or in other instances that would confer left hemispheric dysfunction. Clinical correlation recommended. There is also evidence of mild to moderate diffuse cerebral dysfunction of a nonspecific etiology, with superimposed sedative/medication effects\".     1/16 - stopped cEEG. Vent day  #4. No purposeful movements, triple flexion response BLE only. (+) cough/gag/corneals. DNR, I OK.  1/17 -vent day #5.  No significant neurological changes    Interval Problem Update  Reviewed last 24 hour events:  's overnight.  Restarting insulin " drip.  Afebrile  SR 80s to 90s  SBP 110s to 140s  Neuro: No spontaneous movements.  Does not follow commands.  Triple flexion BLE.  No movement BUE.  Vent day #5: APVC 14/420/8/30%  CPOT 0. No pain meds  RASS -4, no sedation  TF @ goal. BMS  Mcbride, PIV, LUE PICC  Lovenox, PPI.  Mobility 1 -not eligible to advance    Updated wife and 2 sons at bedside. Plan for now is to still wait until day #10 (Monday 1/22) for decision to transition to comfort care.     Review of Systems  Review of Systems   Unable to perform ROS: Intubated        Vital Signs for last 24 hours   Pulse:  [] 98  Resp:  [11-29] 17  BP: (111-154)/(60-84) 111/68  SpO2:  [97 %-99 %] 98 %    Hemodynamic parameters for last 24 hours       Respiratory Information for the last 24 hours  Vent Mode: APVCMV  Rate (breaths/min): 14  Vt Target (mL): 420  PEEP/CPAP: 8  MAP: 13  Control VTE (exp VT): 403    Physical Exam   Physical Exam  Vitals and nursing note reviewed.   Constitutional:       General: He is not in acute distress.     Appearance: Normal appearance. He is ill-appearing.      Interventions: He is intubated.   HENT:      Head: Normocephalic.      Nose: Nose normal.      Mouth/Throat:      Lips: Pink.      Mouth: Mucous membranes are moist.   Eyes:      Pupils: Pupils are equal, round, and reactive to light.   Cardiovascular:      Rate and Rhythm: Normal rate and regular rhythm.      Pulses: Normal pulses.      Heart sounds: Normal heart sounds.   Pulmonary:      Effort: Pulmonary effort is normal. He is intubated.      Breath sounds: No wheezing or rhonchi.   Abdominal:      General: Bowel sounds are normal.      Palpations: Abdomen is soft.   Musculoskeletal:      Right lower leg: No edema.      Left lower leg: No edema.   Skin:     General: Skin is warm and dry.   Neurological:      GCS: GCS eye subscore is 1. GCS verbal subscore is 1. GCS motor subscore is 1.      Comments: 3T  Triple flexion BLE  No spontaneous movements  BUE flaccid    Psychiatric:      Comments: Intubated         Medications  Current Facility-Administered Medications   Medication Dose Route Frequency Provider Last Rate Last Admin    insulin regular (Humulin R) 100 Units in  mL Infusion  0-29 Units/hr Intravenous Continuous Tammy Morton A.P.R.N.        dextrose 50% (D50W) injection 12.5-25 g  12.5-25 g Intravenous PRN Tammy Morton A.P.R.N.        levETIRAcetam (Keppra) tablet 1,000 mg  1,000 mg Enteral Tube BID Juan Jose Suarez M.D.   1,000 mg at 01/18/24 0509    scopolamine (Transderm-Scop) patch 1 Patch  1 Patch Transdermal Q72HRS Tammy Morton A.P.R.N.   1 Patch at 01/17/24 1340    labetalol (Normodyne/Trandate) injection 10-20 mg  10-20 mg Intravenous Q4HRS PRN Tammy Morton A.P.R.N.   10 mg at 01/16/24 1612    hydrALAZINE (Apresoline) injection 10-20 mg  10-20 mg Intravenous Q4HRS PRN Tammy Morton, A.P.R.N.        Pharmacy Consult: Enteral tube insertion - review meds/change route/product selection  1 Each Other PHARMACY TO DOSE Tammy Morton A.P.R.N.        bismuth subsalicylate (Pepto-Bismol) suspension 524 mg  30 mL Enteral Tube Q6HRS Tammy Morton A.P.R.N.   524 mg at 01/18/24 0509    Respiratory Therapy Consult   Nebulization Continuous RT Victorina Christianson M.D.        [Held by provider] senna-docusate (Pericolace Or Senokot S) 8.6-50 MG per tablet 2 Tablet  2 Tablet Enteral Tube BID Victorina Christianson M.D.   2 Tablet at 01/16/24 1800    And    [Held by provider] polyethylene glycol/lytes (Miralax) Packet 1 Packet  1 Packet Enteral Tube QDAY PRN Victorina Christianson M.D.        And    [Held by provider] magnesium hydroxide (Milk Of Magnesia) suspension 30 mL  30 mL Enteral Tube QDAY PRN Victorina Christianson M.D.        And    [Held by provider] bisacodyl (Dulcolax) suppository 10 mg  10 mg Rectal QDAY PRN Victorina Christianson M.D.        MD Alert...ICU Electrolyte Replacement per Pharmacy   Other PHARMACY TO DOSE Victorina Christianson M.D.        lidocaine  (Xylocaine) 1 % injection 2 mL  2 mL Tracheal Tube Q30 MIN PRN Victorina Christianson M.D.        ipratropium-albuterol (DUONEB) nebulizer solution  3 mL Nebulization Q2HRS PRN (RT) Victorina Christianson M.D.        metoprolol tartrate (Lopressor) tablet 25 mg  25 mg Enteral Tube BID Victorina Christianson M.D.   25 mg at 01/18/24 0509    enoxaparin (Lovenox) inj 40 mg  40 mg Subcutaneous DAILY AT 1800 Victorina Christianson M.D.   40 mg at 01/17/24 1716    ondansetron (Zofran) syringe/vial injection 4 mg  4 mg Intravenous Q4HRS PRN Victorina Christianson M.D.        acetaminophen (Tylenol) tablet 650 mg  650 mg Enteral Tube Q6HRS PRN Victorina Christianson M.D.        ondansetron (Zofran ODT) dispertab 4 mg  4 mg Enteral Tube Q4HRS PRN Victorina Christianson M.D.        atorvastatin (Lipitor) tablet 80 mg  80 mg Enteral Tube Q EVENING Victorina Christianson M.D.   80 mg at 01/17/24 1717    aspirin (Asa) chewable tab 81 mg  81 mg Enteral Tube DAILY Victorina Christianson M.D.   81 mg at 01/18/24 0509    pantoprazole (Protonix) injection 40 mg  40 mg Intravenous BID Mari GARZON Latona   40 mg at 01/18/24 0509    tetracycline (Sumycin) capsule 500 mg  500 mg Enteral Tube Q6HRS Mari GARZON Latona   500 mg at 01/18/24 0510    metroNIDAZOLE (Flagyl) tablet 500 mg  500 mg Enteral Tube Q8HRS Mari L. Latona   500 mg at 01/18/24 0509       Fluids    Intake/Output Summary (Last 24 hours) at 1/18/2024 1057  Last data filed at 1/18/2024 1019  Gross per 24 hour   Intake 1318.15 ml   Output 2570 ml   Net -1251.85 ml       Laboratory  Recent Labs     01/16/24  0402   ISTATAPH 7.463   ISTATAPCO2 37.8*   ISTATAPO2 106*   ISTATATCO2 28   MCHBQTQ2ZPB 98   ISTATARTHCO3 27.1*   ISTATARTBE 3   ISTATTEMP 98.1 F   ISTATFIO2 30   ISTATSPEC Arterial   ISTATAPHTC 7.467   UZQTMEAG1MJ 104*         Recent Labs     01/16/24  0430 01/17/24  0420 01/18/24  0400   SODIUM 136 137 135   POTASSIUM 4.3 3.8 4.8   CHLORIDE 101 100 100   CO2 25 28 25   BUN 15 17 20   CREATININE 1.13 1.00 1.10   MAGNESIUM  2.0 2.0  --    PHOSPHORUS 3.5 3.7  --    CALCIUM 8.5 8.8 8.9     Recent Labs     01/16/24 0430 01/17/24 0420 01/18/24  0400   PREALBUMIN 9.9*  --   --    GLUCOSE 283* 205* 379*     Recent Labs     01/16/24 0430 01/17/24 0420 01/18/24  0400   WBC 5.2 5.2 9.5   NEUTSPOLYS 59.00 59.20 71.70   LYMPHOCYTES 20.90* 20.20* 11.10*   MONOCYTES 15.50* 14.60* 12.70   EOSINOPHILS 1.90 2.10 1.00   BASOPHILS 0.60 0.80 0.70     Recent Labs     01/16/24 0430 01/17/24 0420 01/18/24  0400   RBC 3.48* 3.46* 3.71*   HEMOGLOBIN 10.6* 10.4* 11.1*   HEMATOCRIT 32.5* 31.2* 33.7*   PLATELETCT 413 399 449*       Imaging  No new imaging today.    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  Suggest hypoglycemia due to possible sulfonylurea overdose.  -Seizure Precautions  -continue Keppra    GIB (gastrointestinal bleeding)- (present on admission)  Assessment & Plan  -EGD 1/2/2024 showed H. pylori, patient was sent home on PPI, bismuth, tetracycline, and metronidazole for 14 days  -suspected, mild  -Hgb stable  -gastric occult (+)  -PPI  -consider GI consult if worsens    On mechanically assisted ventilation (HCC)- (present on admission)  Assessment & Plan  -Intubated for low GCS, not protecting airway, and hypoxia  -Intubation date 1/12  -Ventilator dependent respiratory failure  -Modify ventilator to optimize oxygenation, acid-base balance and ventilation  -CXR as indicated: monitor lung volumes and tube/line placement  -HOB > 30  -Titrate FiO2 to keep sats greater than 92%  -Chlorhexidine  -goal CO2 35-40  -Daily awakening and SBT trials unless contraindicated  -ABCDEF bundle  -I am actively adjusting ventilator based on clinical indicators and ABG's      Acute encephalopathy- (present on admission)  Assessment &  "Plan  -Metabolic-->Concerning for hypoglycemic event at home vs anoxic brain injury vs seizures  -Keep patient awake during the day and avoid daytime naps. Remove all unnecessary lines (central lines, peripheral IVs, feeding tubes, yadav catheters). Avoid polypharmacy, frequent re-orientation, maximize family time at bedside, use glasses and hearing aids if needed, treat pain, encourage ambulation, minimize benzos/anticholinergic agents.   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 German    S/P CABG x 2- (present on admission)  Assessment & Plan  -History of 2015  -Continue statin and aspirin    Essential hypertension- (present on admission)  Assessment & Plan  -SBP goals < 160  -PRN's available    Multiple vessel coronary artery disease- (present on admission)  Assessment & Plan  -S/p Stent placement followed by CABG 2015    Type 2 diabetes mellitus without complication, without long-term current use of insulin (HCC)- (present on admission)  Assessment & Plan  -Hypoglycemia episode prompting EMS activation and admission to outside facility  -Possible sulfonylurea overdose -octreotide every 6 hours per neurology recommendations x 24 hours with no significant improvement. Stopped 1/16  -Hypoglycemia protocol  -Accu-Cheks every 6 hours and as needed  -Dietary consult for tube feeding recommendations  -try and avoid BG > 180 given risk for glucose reperfusion injury  -Insulin drip 140-180 protocol         VTE:  Lovenox  Ulcer: PPI  Lines: Yadav Catheter  Ongoing indication addressed and PICC    I have performed a physical exam and reviewed and updated ROS and Plan today (1/18/2024). In review of " yesterday's note (1/17/2024), there are no changes except as documented above.     Discussed patient condition and risk of morbidity and/or mortality with Family, RN, RT, Pharmacy, Dietary, , neurology, and my attending Dr. Suarez.  The patient remains critically ill.  Critical care time = 60 minutes in directly providing and coordinating critical care and extensive data review.  No time overlap and excludes procedures.    Please note that this dictation was created using voice recognition software. I have made every reasonable attempt to correct obvious errors, but there may be errors of grammar and possibly content that I did not discover before finalizing the note.    YOLANDA Vargas.

## 2024-01-18 NOTE — CARE PLAN
Problem: Ventilation  Goal: Ability to achieve and maintain unassisted ventilation or tolerate decreased levels of ventilator support  Description: Target End Date:  4 days     Document on Vent flowsheet    1.  Support and monitor invasive and noninvasive mechanical ventilation  2.  Monitor ventilator weaning response  3.  Perform ventilator associated pneumonia prevention interventions  4.  Manage ventilation therapy by monitoring diagnostic test results  Outcome: Progressing     Ventilator Daily Summary    Vent Day #5    Airway:  ETT 7.5 @ 24    Ventilator settings: 14/420/8/30     Weaning trials: none    Respiratory Procedures: none      Plan: Continue current ventilator settings and wean mechanical ventilation as tolerated per physician orders.

## 2024-01-18 NOTE — CARE PLAN
The patient is Watcher - Medium risk of patient condition declining or worsening    Shift Goals  Clinical Goals: Q 1 blood sugars, neuro checks  Patient Goals: terri  Family Goals: terri    Progress made toward(s) clinical / shift goals:      Patient is not progressing towards the following goals:      Problem: Hemodynamics  Goal: Patient's hemodynamics, fluid balance and neurologic status will be stable or improve  Outcome: Progressing     Problem: Pain - Standard  Goal: Alleviation of pain or a reduction in pain to the patient’s comfort goal  Outcome: Progressing

## 2024-01-18 NOTE — CARE PLAN
The patient is Watcher - Medium risk of patient condition declining or worsening    Shift Goals  Clinical Goals: neuro checks  Patient Goals: terri  Family Goals: updates    Progress made toward(s) clinical / shift goals:      Problem: Nutrition  Goal: Enteral nutrition will be maintained or improve  Outcome: Progressing     Problem: Bowel Elimination  Goal: Establish and maintain regular bowel function  Outcome: Progressing     Problem: Pain - Standard  Goal: Alleviation of pain or a reduction in pain to the patient’s comfort goal  Outcome: Progressing       Patient is not progressing towards the following goals: N/A

## 2024-01-18 NOTE — FLOWSHEET NOTE
01/18/24 0634   Spontaneous Breathing Trial (SBT)   Safety screen spontaneous breathing trial (SBT) Failed SAT - Do NOT begin SBT  (held per RN)

## 2024-01-18 NOTE — PROGRESS NOTES
Neurology Follow-up  Neurohospitalist Service, [facility]    Referring Physician: Juan Jose Suarez M.D.    No chief complaint on file.      Interval history:     Review of systems: In addition to what is detailed in the HPI above, all other systems reviewed and are negative.    Past Medical History:    has a past medical history of CAD (coronary artery disease), Diabetes, Diabetes (HCC), F/u of acute inferolateral myocardial infarction (HCC) (2/7/2012), Hyperlipidemia, Hypertension, and Myocardial infarct (HCC) (1/19/2012).    FHx:  family history includes Cancer in his mother; Diabetes in his father and mother; Heart Disease in his father and sister.    SHx:   reports that he has never smoked. He has never used smokeless tobacco. He reports current alcohol use. He reports that he does not use drugs.    Allergies:  No Known Allergies    Medications:    Current Facility-Administered Medications:     insulin GLARGINE (Lantus,Semglee) injection, 15 Units, Subcutaneous, BID, Tammy Morton A.P.R.N., 15 Units at 01/18/24 0744    levETIRAcetam (Keppra) tablet 1,000 mg, 1,000 mg, Enteral Tube, BID, Juan Jose Suarez M.D., 1,000 mg at 01/18/24 0509    insulin regular (HumuLIN R,NovoLIN R) injection, 3-14 Units, Subcutaneous, Q6HRS, 10 Units at 01/18/24 0504 **AND** POC blood glucose manual result, , , Q6H **AND** NOTIFY MD and PharmD, , , Once **AND** Administer 20 grams of glucose (approximately 8 ounces of fruit juice) every 15 minutes PRN FSBG less than 70 mg/dL, , , PRN **AND** dextrose 50% (D50W) injection 25 g, 25 g, Intravenous, Q15 MIN PRN, Tammy Morton A.P.R.N.    scopolamine (Transderm-Scop) patch 1 Patch, 1 Patch, Transdermal, Q72HRS, DELON Vargas.P.R.N., 1 Patch at 01/17/24 1340    labetalol (Normodyne/Trandate) injection 10-20 mg, 10-20 mg, Intravenous, Q4HRS PRN, GAEL VargasREileenNEileen, 10 mg at 01/16/24 1612    hydrALAZINE (Apresoline) injection 10-20 mg, 10-20 mg,  Intravenous, Q4HRS PRN, JANE Vargas    Pharmacy Consult: Enteral tube insertion - review meds/change route/product selection, 1 Each, Other, PHARMACY TO DOSE, YOLANDA Vargas.    bismuth subsalicylate (Pepto-Bismol) suspension 524 mg, 30 mL, Enteral Tube, Q6HRS, JANE Vargas, 524 mg at 01/18/24 0509    Respiratory Therapy Consult, , Nebulization, Continuous RT, Victorina Christianson M.D.    [Held by provider] senna-docusate (Pericolace Or Senokot S) 8.6-50 MG per tablet 2 Tablet, 2 Tablet, Enteral Tube, BID, 2 Tablet at 01/16/24 1800 **AND** [Held by provider] polyethylene glycol/lytes (Miralax) Packet 1 Packet, 1 Packet, Enteral Tube, QDAY PRN **AND** [Held by provider] magnesium hydroxide (Milk Of Magnesia) suspension 30 mL, 30 mL, Enteral Tube, QDAY PRN **AND** [Held by provider] bisacodyl (Dulcolax) suppository 10 mg, 10 mg, Rectal, QDAY PRN, Victorina Christianson M.D.    MD Alert...ICU Electrolyte Replacement per Pharmacy, , Other, PHARMACY TO DOSE, Victorina Christianson M.D.    lidocaine (Xylocaine) 1 % injection 2 mL, 2 mL, Tracheal Tube, Q30 MIN PRN, Victorina Christianson M.D.    ipratropium-albuterol (DUONEB) nebulizer solution, 3 mL, Nebulization, Q2HRS PRN (RT), Victorina Christianson M.D.    metoprolol tartrate (Lopressor) tablet 25 mg, 25 mg, Enteral Tube, BID, Victorina Christianson M.D., 25 mg at 01/18/24 0509    enoxaparin (Lovenox) inj 40 mg, 40 mg, Subcutaneous, DAILY AT 1800, Victorina Christianson M.D., 40 mg at 01/17/24 1716    ondansetron (Zofran) syringe/vial injection 4 mg, 4 mg, Intravenous, Q4HRS PRN, Victorina Christianson M.D.    acetaminophen (Tylenol) tablet 650 mg, 650 mg, Enteral Tube, Q6HRS PRN, Victorina Christianson M.D.    ondansetron (Zofran ODT) dispertab 4 mg, 4 mg, Enteral Tube, Q4HRS PRN, Victorina Christianson M.D.    atorvastatin (Lipitor) tablet 80 mg, 80 mg, Enteral Tube, Q EVENING, Victorina Christianson M.D., 80 mg at 01/17/24 5347    aspirin (Asa) chewable tab 81 mg, 81 mg, Enteral  "Tube, DAILY, Victorina Christianson M.D., 81 mg at 01/18/24 0509    pantoprazole (Protonix) injection 40 mg, 40 mg, Intravenous, BID, Mari L. Latona, 40 mg at 01/18/24 0509    tetracycline (Sumycin) capsule 500 mg, 500 mg, Enteral Tube, Q6HRS, Mari L. Latona, 500 mg at 01/18/24 0510    metroNIDAZOLE (Flagyl) tablet 500 mg, 500 mg, Enteral Tube, Q8HRS, Mari L. Latona, 500 mg at 01/18/24 0509    Physical Examination:     General: Patient is awake and in no acute distress  Eye: Examination of optic disks not indicated at this time given acuity of consult  Neck: There is normal range of motion  CV: regular rate   Extremities:  clear, dry, intact, without peripheral edema    NEUROLOGICAL EXAM:     /70   Pulse 87   Temp 37.2 °C (99 °F) (Bladder)   Resp 18   Ht 1.702 m (5' 7.01\")   Wt 75.1 kg (165 lb 9.1 oz)   SpO2 97%   BMI 25.93 kg/m²       Mental status: Sedated, intubated.   Speech and language:no response to voice/pain. Unable to produce speech  Cranial Nerves: PEERL, no blink to threat, corneal reflex intact bilaterally, unable to eval VF, no obvious facial asymmetry; unable to test uvular lift and tongue protrusion, SCM and trap; Hearing intact grossly.  Motor exam: Normal bulk and tone. No movement of Bilateral UE to pain; triple flexion to plantar stimulation bilaterally - more briskly on the right than left.   Sensory exam: some response to pain in lower extremity  Deep tendon reflexes:  reflexes in the lower and upper extremities  Coordination: Deferred secondary to sedation and ventilation  Gait: Deferred due to patient preference    Objective Data:    Labs:  Lab Results   Component Value Date/Time    PROTHROMBTM 16.1 (H) 01/14/2024 06:30 PM    INR 1.28 (H) 01/14/2024 06:30 PM      Lab Results   Component Value Date/Time    WBC 9.5 01/18/2024 04:00 AM    RBC 3.71 (L) 01/18/2024 04:00 AM    HEMOGLOBIN 11.1 (L) 01/18/2024 04:00 AM    HEMATOCRIT 33.7 (L) 01/18/2024 04:00 AM    MCV 90.8 01/18/2024 04:00 " "AM    MCH 29.9 01/18/2024 04:00 AM    MCHC 32.9 01/18/2024 04:00 AM    MPV 8.4 (L) 01/18/2024 04:00 AM    NEUTSPOLYS 71.70 01/18/2024 04:00 AM    LYMPHOCYTES 11.10 (L) 01/18/2024 04:00 AM    MONOCYTES 12.70 01/18/2024 04:00 AM    EOSINOPHILS 1.00 01/18/2024 04:00 AM    BASOPHILS 0.70 01/18/2024 04:00 AM      Lab Results   Component Value Date/Time    SODIUM 135 01/18/2024 04:00 AM    POTASSIUM 4.8 01/18/2024 04:00 AM    CHLORIDE 100 01/18/2024 04:00 AM    CO2 25 01/18/2024 04:00 AM    GLUCOSE 379 (H) 01/18/2024 04:00 AM    BUN 20 01/18/2024 04:00 AM    CREATININE 1.10 01/18/2024 04:00 AM    BUNCREATRAT 11 12/15/2023 11:36 AM    BUNCREATRAT 16 01/31/2023 04:27 AM      Lab Results   Component Value Date/Time    CHOLSTRLTOT 147 01/31/2023 04:27 AM    LDL Comment 07/16/2018 08:37 AM    HDL 30 (L) 01/31/2023 04:27 AM    TRIGLYCERIDE 210 (H) 01/31/2023 04:27 AM       Lab Results   Component Value Date/Time    ALKPHOSPHAT 48 01/15/2024 03:38 AM    ASTSGOT 17 01/15/2024 03:38 AM    ALTSGPT 18 01/15/2024 03:38 AM    TBILIRUBIN 0.3 01/15/2024 03:38 AM        Imaging/Testing:    I interpreted and/or reviewed the patient's neuroimaging    DX-CHEST-PORTABLE (1 VIEW)   Final Result         1.  No acute cardiopulmonary disease.      DX-CHEST-PORTABLE (1 VIEW)   Final Result         1.  No acute cardiopulmonary disease.      DX-CHEST-PORTABLE (1 VIEW)   Final Result         1.  No acute cardiopulmonary disease.      DX-CHEST-PORTABLE (1 VIEW)   Final Result      OUTSIDE IMAGES-CT HEAD   Final Result      OUTSIDE IMAGES-DX CHEST   Final Result      OUTSIDE IMAGES-DX CHEST   Final Result      MR-FOREIGN FILM MRI   Final Result          Impression and Recommendations:    # Unresponsiveness  #Coma after prolonged hypoglycemia  Prior to presentation at our facility, the patient presented to an outside facility after being found to have a glucose of 22.  MRI at outside facility reported to be concerning for \"likely hypoglycemic " "encephalopathy\".Patient's current mentation is likely medication induced (patient is on 70 units of insulin at home and glimepiride 4 mg twice daily).  On day 3 patient was started on octreotide for possible sulfonylurea overdose; octreotide was stopped on 1/16/2024, there is no significant improvement or change in patient's mentation.  Patient continues to have poor localization to pain after day 6 of hospitalization. Prognosis remains guarded, 14 days of treatment is needed before full prognosis can be determined.  - Continue neurochecks  - Avoid blood glucose level > 180, given the risk of glucose reperfusion injury  - Repeat brain MRI on day 7 to assess/evaluate previously identified brain hyper-intensities on outside facility MRI  - Keppra 1000 mg twice daily  - Seizure precautions      Yogesh Carmen MD  Neurohospitalist, Acute Care Services          Please note that this dictation was created using voice recognition software.  I have made every reasonable attempt to correct obvious errors, but I expect that there are errors of grammar and possibly content that I did not discover before finalizing the note.   "

## 2024-01-19 NOTE — PROGRESS NOTES
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 Pylori who transferred here with seizures requiring cEEG. Per wife, patient began exhibiting AMS on 1/11. On 1/12 patient was not improving and wife called EMS. Upon arrival of EMS, patients glucose was 22 and he was given 25 gm Dextrose with no improvement in mentation. He was brought to [facility] ED and was emergently intubated for hypoxia and airway protection. 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 Neurology was consulted and recommended 24 hour EEG which is not available at [facility].     Per epileptologist interpreting the EEG 1/16: \"Findings suggest a predisposition for seizures to arise from the left hemisphere, with no seizures observed. There is a resolution of this finding, which in the context of anti-seizure medication treatment, suggests their origin was epileptiform.      In addition, there is focal left hemispheric dysfunction relative to the right, a finding which may be seen in the setting of a structural abnormality, postictally, or in other instances that would confer left hemispheric dysfunction. Clinical correlation recommended. There is also evidence of mild to moderate diffuse cerebral dysfunction of a nonspecific etiology, with superimposed sedative/medication effects\".     1/16 - stopped cEEG. Vent day  #4. No purposeful movements, triple flexion response BLE only. (+) cough/gag/corneals. DNR, I OK.  1/17 -vent day #5.  No significant neurological changes  1/18 - Vent day #6. No significant neurological changes    Interval Problem Update  Reviewed last 24 " hour events:  No overnight events.  T max 100.8  Insulin drip @ 7.  Chemstick 140s to 170s  SR/ST 90s to 100s  SBP 100s to 110s.  No drips  Vent day #7: AP VC 14/420/8/30%  Tube feedings at goal.  BMS  No sedation.  RASS -4  I/O: 2000/1230  PPI, Lovenox, ABX complete today for H. Pylori  Palliative Consult  Mobility 1 -not eligible to advance due to mental status    Review of Systems  Review of Systems   Unable to perform ROS: Intubated        Vital Signs for last 24 hours   Temp:  [37.7 °C (99.8 °F)] 37.7 °C (99.8 °F)  Pulse:  [] 95  Resp:  [13-32] 17  BP: ()/(52-74) 112/60  SpO2:  [97 %-99 %] 98 %    Hemodynamic parameters for last 24 hours       Respiratory Information for the last 24 hours  Vent Mode: APVCMV  Rate (breaths/min): 14  Vt Target (mL): 420  PEEP/CPAP: 8  MAP: 12  Control VTE (exp VT): 468    Physical Exam   Physical Exam  Vitals and nursing note reviewed.   Constitutional:       General: He is not in acute distress.     Appearance: Normal appearance. He is ill-appearing.      Interventions: He is intubated.   HENT:      Head: Normocephalic.      Nose: Nose normal.      Mouth/Throat:      Lips: Pink.      Mouth: Mucous membranes are moist.   Eyes:      Pupils: Pupils are equal, round, and reactive to light.   Cardiovascular:      Rate and Rhythm: Normal rate and regular rhythm.      Pulses: Normal pulses.      Heart sounds: Normal heart sounds.   Pulmonary:      Effort: Pulmonary effort is normal. He is intubated.      Breath sounds: No wheezing or rhonchi.   Abdominal:      General: Bowel sounds are normal.      Palpations: Abdomen is soft.   Musculoskeletal:      Right lower leg: No edema.      Left lower leg: No edema.   Skin:     General: Skin is warm and dry.   Neurological:      GCS: GCS eye subscore is 1. GCS verbal subscore is 1. GCS motor subscore is 1.      Comments: 3T  Triple flexion BLE  No spontaneous movements  BUE flaccid   Psychiatric:      Comments: Intubated          Medications  Current Facility-Administered Medications   Medication Dose Route Frequency Provider Last Rate Last Admin    insulin regular (Humulin R) 100 Units in  mL Infusion  0-29 Units/hr Intravenous Continuous Tammy Morton A.P.R.N. 6.5 mL/hr at 01/19/24 1428 6.5 Units/hr at 01/19/24 1428    dextrose 50% (D50W) injection 12.5-25 g  12.5-25 g Intravenous PRN Tammy Morton, A.P.R.N.        levETIRAcetam (Keppra) tablet 1,000 mg  1,000 mg Enteral Tube BID Juan Jose Suarez M.D.   1,000 mg at 01/19/24 0500    scopolamine (Transderm-Scop) patch 1 Patch  1 Patch Transdermal Q72HRS Tammy Morton, A.P.R.N.   1 Patch at 01/17/24 1340    labetalol (Normodyne/Trandate) injection 10-20 mg  10-20 mg Intravenous Q4HRS PRN Tammy Morton A.P.R.N.   10 mg at 01/16/24 1612    hydrALAZINE (Apresoline) injection 10-20 mg  10-20 mg Intravenous Q4HRS PRN Tammy Morton, A.P.R.N.        Pharmacy Consult: Enteral tube insertion - review meds/change route/product selection  1 Each Other PHARMACY TO DOSE Tammy Morton, A.P.R.N.        bismuth subsalicylate (Pepto-Bismol) suspension 524 mg  30 mL Enteral Tube Q6HRS Tammy Morton A.P.R.N.   524 mg at 01/19/24 1154    Respiratory Therapy Consult   Nebulization Continuous RT Victorina Christianson M.D.        [Held by provider] senna-docusate (Pericolace Or Senokot S) 8.6-50 MG per tablet 2 Tablet  2 Tablet Enteral Tube BID Victorina Christianson M.D.   2 Tablet at 01/16/24 1800    And    [Held by provider] polyethylene glycol/lytes (Miralax) Packet 1 Packet  1 Packet Enteral Tube QDAY PRN Victorina Christianson M.D.        And    [Held by provider] magnesium hydroxide (Milk Of Magnesia) suspension 30 mL  30 mL Enteral Tube QDAY PRN Victorina Christianson M.D.        And    [Held by provider] bisacodyl (Dulcolax) suppository 10 mg  10 mg Rectal QDAY PRN Victorina Christianson M.D.        MD Alert...ICU Electrolyte Replacement per Pharmacy   Other PHARMACY TO DOSE Victorina Christianson M.D.         lidocaine (Xylocaine) 1 % injection 2 mL  2 mL Tracheal Tube Q30 MIN PRN Victorina Christianson M.D.        ipratropium-albuterol (DUONEB) nebulizer solution  3 mL Nebulization Q2HRS PRN (RT) Victorina Christianson M.D.        metoprolol tartrate (Lopressor) tablet 25 mg  25 mg Enteral Tube BID Victorina Christianson M.D.   25 mg at 01/19/24 0500    enoxaparin (Lovenox) inj 40 mg  40 mg Subcutaneous DAILY AT 1800 Victorina Christianson M.D.   40 mg at 01/18/24 1723    ondansetron (Zofran) syringe/vial injection 4 mg  4 mg Intravenous Q4HRS PRN Victorina Christianson M.D.        acetaminophen (Tylenol) tablet 650 mg  650 mg Enteral Tube Q6HRS PRN Victorina Christianson M.D.   650 mg at 01/19/24 1154    ondansetron (Zofran ODT) dispertab 4 mg  4 mg Enteral Tube Q4HRS PRN Victorina Christianson M.D.        atorvastatin (Lipitor) tablet 80 mg  80 mg Enteral Tube Q EVENING Victorina Christianson M.D.   80 mg at 01/18/24 1723    aspirin (Asa) chewable tab 81 mg  81 mg Enteral Tube DAILY Victorina Christianson M.D.   81 mg at 01/19/24 0500    pantoprazole (Protonix) injection 40 mg  40 mg Intravenous BID Mari GARZON Latona   40 mg at 01/19/24 0504    tetracycline (Sumycin) capsule 500 mg  500 mg Enteral Tube Q6HRS Mari GARZON Latona   500 mg at 01/19/24 1154       Fluids    Intake/Output Summary (Last 24 hours) at 1/19/2024 1451  Last data filed at 1/19/2024 1400  Gross per 24 hour   Intake 2099.55 ml   Output 1120 ml   Net 979.55 ml       Laboratory          Recent Labs     01/17/24  0420 01/18/24  0400 01/19/24  0301   SODIUM 137 135 137   POTASSIUM 3.8 4.8 4.3   CHLORIDE 100 100 101   CO2 28 25 27   BUN 17 20 25*   CREATININE 1.00 1.10 1.01   MAGNESIUM 2.0  --   --    PHOSPHORUS 3.7  --   --    CALCIUM 8.8 8.9 8.9     Recent Labs     01/17/24  0420 01/18/24  0400 01/19/24  0301   GLUCOSE 205* 379* 157*     Recent Labs     01/17/24  0420 01/18/24  0400 01/19/24  0301   WBC 5.2 9.5 13.6*   NEUTSPOLYS 59.20 71.70 73.20*   LYMPHOCYTES 20.20* 11.10* 10.20*   MONOCYTES 14.60*  12.70 11.30   EOSINOPHILS 2.10 1.00 0.80   BASOPHILS 0.80 0.70 0.70     Recent Labs     01/17/24  0420 01/18/24  0400 01/19/24  0301   RBC 3.46* 3.71* 3.70*   HEMOGLOBIN 10.4* 11.1* 11.2*   HEMATOCRIT 31.2* 33.7* 33.3*   PLATELETCT 399 449* 429       Imaging  No new imaging today.    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  Suggest hypoglycemia due to possible sulfonylurea overdose.  -Seizure Precautions  -continue Keppra    GIB (gastrointestinal bleeding)- (present on admission)  Assessment & Plan  -EGD 1/2/2024 showed H. pylori, patient was sent home on PPI, bismuth, tetracycline, and metronidazole for 14 days  -suspected, mild  -Hgb stable  -gastric occult (+)  -PPI  -consider GI consult if worsens    On mechanically assisted ventilation (HCC)- (present on admission)  Assessment & Plan  -Intubated for low GCS, not protecting airway, and hypoxia  -Intubation date 1/12  -Ventilator dependent respiratory failure  -Modify ventilator to optimize oxygenation, acid-base balance and ventilation  -CXR as indicated: monitor lung volumes and tube/line placement  -HOB > 30  -Titrate FiO2 to keep sats greater than 92%  -Chlorhexidine  -goal CO2 35-40  -Daily awakening and SBT trials unless contraindicated  -ABCDEF bundle  -I am actively adjusting ventilator based on clinical indicators and ABG's      Acute encephalopathy- (present on admission)  Assessment & Plan  -Metabolic-->Concerning for hypoglycemic event at home vs anoxic brain injury vs seizures  -Keep patient awake during the day and avoid daytime naps. Remove all unnecessary lines (central lines, peripheral IVs, feeding tubes, yadav catheters). Avoid polypharmacy, frequent re-orientation, maximize family time at bedside, use glasses and hearing aids  "if needed, treat pain, encourage ambulation, minimize benzos/anticholinergic agents.   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 German    S/P CABG x 2- (present on admission)  Assessment & Plan  -History of 2015  -Continue statin and aspirin    Essential hypertension- (present on admission)  Assessment & Plan  -SBP goals < 160  -PRN's available    Multiple vessel coronary artery disease- (present on admission)  Assessment & Plan  -S/p Stent placement followed by CABG 2015    Type 2 diabetes mellitus without complication, without long-term current use of insulin (HCC)- (present on admission)  Assessment & Plan  -Hypoglycemia episode prompting EMS activation and admission to outside facility  -Possible sulfonylurea overdose -octreotide every 6 hours per neurology recommendations x 24 hours with no significant improvement. Stopped 1/16  -Hypoglycemia protocol  -Accu-Cheks every 6 hours and as needed  -Dietary consult for tube feeding recommendations  -try and avoid BG > 180 given risk for glucose reperfusion injury  -Insulin drip 140-180 protocol         VTE:  Lovenox  Ulcer: H2 Antagonist  Lines: Mcbride Catheter  Ongoing indication addressed and PICC    I have performed a physical exam and reviewed and updated ROS and Plan today (1/19/2024). In review of yesterday's note (1/18/2024), there are no changes except as documented above.     Discussed patient condition and risk of morbidity and/or mortality with RN, RT, Pharmacy, , neurology, and my attending Dr. Suarez  The patient remains critically ill.  Critical care time = 40 minutes in directly providing and coordinating critical care " and extensive data review.  No time overlap and excludes procedures.    Please note that this dictation was created using voice recognition software. I have made every reasonable attempt to correct obvious errors, but there may be errors of grammar and possibly content that I did not discover before finalizing the note.    YOLANDA Vargas.

## 2024-01-19 NOTE — CARE PLAN
The patient is Watcher - Medium risk of patient condition declining or worsening    Shift Goals  Clinical Goals: stable blood glucose  Patient Goals: terri  Family Goals: terri    Progress made toward(s) clinical / shift goals:      Problem: Fall Risk  Goal: Patient will remain free from falls  Outcome: Progressing     Problem: Hemodynamics  Goal: Patient's hemodynamics, fluid balance and neurologic status will be stable or improve  Outcome: Progressing     Problem: Nutrition  Goal: Enteral nutrition will be maintained or improve  Outcome: Progressing     Problem: Bowel Elimination  Goal: Establish and maintain regular bowel function  Outcome: Progressing     Problem: Pain - Standard  Goal: Alleviation of pain or a reduction in pain to the patient’s comfort goal  Outcome: Progressing       Patient is not progressing towards the following goals:n/a

## 2024-01-19 NOTE — CARE PLAN
The patient is Watcher - Medium risk of patient condition declining or worsening    Shift Goals  Clinical Goals: q1hr blood glucose checks  Patient Goals: terri  Family Goals: terri    Progress made toward(s) clinical / shift goals:  Insulin gtt maintained per protocol. Positive cough, gag, and corneal reflexes but 0/5 BUE and withdrawals to pain in BLE. Q2hr turns and tube feeds at goal.  Problem: Knowledge Deficit - Standard  Goal: Patient and family/care givers will demonstrate understanding of plan of care, disease process/condition, diagnostic tests and medications  Outcome: Progressing     Problem: Skin Integrity  Goal: Skin integrity is maintained or improved  Outcome: Progressing     Problem: Fall Risk  Goal: Patient will remain free from falls  Outcome: Progressing     Problem: Psychosocial  Goal: Patient's level of anxiety will decrease  Outcome: Progressing  Goal: Patient's ability to verbalize feelings about condition will improve  Outcome: Progressing  Goal: Patient's ability to re-evaluate and adapt role responsibilities will improve  Outcome: Progressing  Goal: Patient and family will demonstrate ability to cope with life altering diagnosis and/or procedure  Outcome: Progressing  Goal: Spiritual and cultural needs incorporated into hospitalization  Outcome: Progressing     Problem: Nutrition  Goal: Enteral nutrition will be maintained or improve  Outcome: Progressing  Goal: Enteral nutrition will be maintained or improve  Outcome: Progressing     Problem: Venous Thromboembolism (VTE) Prevention  Goal: The patient will remain free from venous thromboembolism (VTE)  Outcome: Progressing     Problem: Urinary - Renal Perfusion  Goal: Ability to achieve and maintain adequate renal perfusion and functioning will improve  Outcome: Progressing     Problem: Mechanical Ventilation  Goal: Safe management of artificial airway and ventilation  Outcome: Not Progressing  Goal: Successful weaning off mechanical  ventilator, spontaneously maintains adequate gas exchange  Outcome: Not Progressing  Goal: Patient will be able to express needs and understand communication  Outcome: Not Progressing       Patient is not progressing towards the following goals:  At this time, pt not able to express needs, communicate or be weaned further off of ventilator.     Problem: Mechanical Ventilation  Goal: Safe management of artificial airway and ventilation  Outcome: Not Progressing  Goal: Successful weaning off mechanical ventilator, spontaneously maintains adequate gas exchange  Outcome: Not Progressing  Goal: Patient will be able to express needs and understand communication  Outcome: Not Progressing

## 2024-01-19 NOTE — PROGRESS NOTES
Patients temp yadav cable adjusted after reading were low and patients temporal temperature was higher, temperatures matching after adjustment.

## 2024-01-19 NOTE — PROGRESS NOTES
Neurology Follow-up  Neurohospitalist Service, [facility]    Referring Physician: Juan Jose Suarez M.D.    No chief complaint on file.      Interval history:   Neurology exam primarily unchanged    Review of systems: In addition to what is detailed in the HPI above, all other systems reviewed and are negative.    Past Medical History:    has a past medical history of CAD (coronary artery disease), Diabetes, Diabetes (HCC), F/u of acute inferolateral myocardial infarction (HCC) (2/7/2012), Hyperlipidemia, Hypertension, and Myocardial infarct (HCC) (1/19/2012).    FHx:  family history includes Cancer in his mother; Diabetes in his father and mother; Heart Disease in his father and sister.    SHx:   reports that he has never smoked. He has never used smokeless tobacco. He reports current alcohol use. He reports that he does not use drugs.    Allergies:  No Known Allergies    Medications:    Current Facility-Administered Medications:     insulin regular (Humulin R) 100 Units in  mL Infusion, 0-29 Units/hr, Intravenous, Continuous, Tammy Morton, A.P.R.N., Last Rate: 5.5 mL/hr at 01/19/24 1106, 5.5 Units/hr at 01/19/24 1106    dextrose 50% (D50W) injection 12.5-25 g, 12.5-25 g, Intravenous, PRN, Tammy Morton, A.P.R.N.    levETIRAcetam (Keppra) tablet 1,000 mg, 1,000 mg, Enteral Tube, BID, Juan Jose Suarez M.D., 1,000 mg at 01/19/24 0500    scopolamine (Transderm-Scop) patch 1 Patch, 1 Patch, Transdermal, Q72HRS, Tammy Morton, A.P.R.N., 1 Patch at 01/17/24 1340    labetalol (Normodyne/Trandate) injection 10-20 mg, 10-20 mg, Intravenous, Q4HRS PRN, Tammy Morton, A.P.R.N., 10 mg at 01/16/24 1612    hydrALAZINE (Apresoline) injection 10-20 mg, 10-20 mg, Intravenous, Q4HRS PRN, TammyJANE Carlson    Pharmacy Consult: Enteral tube insertion - review meds/change route/product selection, 1 Each, Other, PHARMACY TO DOSE, JANE Vargas    bismuth subsalicylate  (Pepto-Bismol) suspension 524 mg, 30 mL, Enteral Tube, Q6HRS, JANE Vargas, 524 mg at 01/19/24 0500    Respiratory Therapy Consult, , Nebulization, Continuous RT, Victorina Christianson M.D.    [Held by provider] senna-docusate (Pericolace Or Senokot S) 8.6-50 MG per tablet 2 Tablet, 2 Tablet, Enteral Tube, BID, 2 Tablet at 01/16/24 1800 **AND** [Held by provider] polyethylene glycol/lytes (Miralax) Packet 1 Packet, 1 Packet, Enteral Tube, QDAY PRN **AND** [Held by provider] magnesium hydroxide (Milk Of Magnesia) suspension 30 mL, 30 mL, Enteral Tube, QDAY PRN **AND** [Held by provider] bisacodyl (Dulcolax) suppository 10 mg, 10 mg, Rectal, QDAY PRN, Victorina Christianson M.D.    MD Alert...ICU Electrolyte Replacement per Pharmacy, , Other, PHARMACY TO DOSE, Victorina Christianson M.D.    lidocaine (Xylocaine) 1 % injection 2 mL, 2 mL, Tracheal Tube, Q30 MIN PRN, Victorina Christianson M.D.    ipratropium-albuterol (DUONEB) nebulizer solution, 3 mL, Nebulization, Q2HRS PRN (RT), Victorina Christianson M.D.    metoprolol tartrate (Lopressor) tablet 25 mg, 25 mg, Enteral Tube, BID, Victorina Christianson M.D., 25 mg at 01/19/24 0500    enoxaparin (Lovenox) inj 40 mg, 40 mg, Subcutaneous, DAILY AT 1800, Victorina Christianson M.D., 40 mg at 01/18/24 1723    ondansetron (Zofran) syringe/vial injection 4 mg, 4 mg, Intravenous, Q4HRS PRN, Victorina Christianson M.D.    acetaminophen (Tylenol) tablet 650 mg, 650 mg, Enteral Tube, Q6HRS PRN, Victorina Christianson M.D., 650 mg at 01/19/24 0333    ondansetron (Zofran ODT) dispertab 4 mg, 4 mg, Enteral Tube, Q4HRS PRN, Victorina Christianson M.D.    atorvastatin (Lipitor) tablet 80 mg, 80 mg, Enteral Tube, Q EVENING, Victorina Christianson M.D., 80 mg at 01/18/24 1723    aspirin (Asa) chewable tab 81 mg, 81 mg, Enteral Tube, DAILY, Victorina Christianson M.D., 81 mg at 01/19/24 0500    pantoprazole (Protonix) injection 40 mg, 40 mg, Intravenous, BID, Mari Arora, 40 mg at 01/19/24 0504    tetracycline (Sumycin)  "capsule 500 mg, 500 mg, Enteral Tube, Q6HRS, Mari L. Latona, 500 mg at 01/19/24 0500    metroNIDAZOLE (Flagyl) tablet 500 mg, 500 mg, Enteral Tube, Q8HRS, Mari L. Latona, 500 mg at 01/19/24 0500    Physical Examination:     General: Patient is awake and in no acute distress  Eye: Examination of optic disks not indicated at this time given acuity of consult  Neck: There is normal range of motion  CV: regular rate   Extremities:  clear, dry, intact, without peripheral edema    NEUROLOGICAL EXAM:     /59   Pulse 97   Temp 37.2 °C (99 °F) (Bladder)   Resp 20   Ht 1.702 m (5' 7.01\")   Wt 70.4 kg (155 lb 3.3 oz)   SpO2 99%   BMI 24.30 kg/m²       Mental status: Sedated, intubated.   Speech and language:no response to voice/pain. Unable to produce speech  Cranial Nerves: PEERL, no blink to threat, corneal reflex intact bilaterally, unable to eval VF, no obvious facial asymmetry; unable to test uvular lift and tongue protrusion, SCM and trap; Hearing intact grossly.  Motor exam: Normal bulk and tone. No movement of Bilateral UE to pain; triple flexion to plantar stimulation bilaterally - more briskly on the right than left.   Sensory exam: some response to pain in lower extremity  Deep tendon reflexes:  reflexes in the lower and upper extremities  Coordination: Deferred secondary to sedation and ventilation  Gait: Deferred due to sedation and intubation    Objective Data:    Labs:  Lab Results   Component Value Date/Time    PROTHROMBTM 16.1 (H) 01/14/2024 06:30 PM    INR 1.28 (H) 01/14/2024 06:30 PM      Lab Results   Component Value Date/Time    WBC 13.6 (H) 01/19/2024 03:01 AM    RBC 3.70 (L) 01/19/2024 03:01 AM    HEMOGLOBIN 11.2 (L) 01/19/2024 03:01 AM    HEMATOCRIT 33.3 (L) 01/19/2024 03:01 AM    MCV 90.0 01/19/2024 03:01 AM    MCH 30.3 01/19/2024 03:01 AM    MCHC 33.6 01/19/2024 03:01 AM    MPV 8.4 (L) 01/19/2024 03:01 AM    NEUTSPOLYS 73.20 (H) 01/19/2024 03:01 AM    LYMPHOCYTES 10.20 (L) 01/19/2024 " "03:01 AM    MONOCYTES 11.30 01/19/2024 03:01 AM    EOSINOPHILS 0.80 01/19/2024 03:01 AM    BASOPHILS 0.70 01/19/2024 03:01 AM      Lab Results   Component Value Date/Time    SODIUM 137 01/19/2024 03:01 AM    POTASSIUM 4.3 01/19/2024 03:01 AM    CHLORIDE 101 01/19/2024 03:01 AM    CO2 27 01/19/2024 03:01 AM    GLUCOSE 157 (H) 01/19/2024 03:01 AM    BUN 25 (H) 01/19/2024 03:01 AM    CREATININE 1.01 01/19/2024 03:01 AM    BUNCREATRAT 11 12/15/2023 11:36 AM    BUNCREATRAT 16 01/31/2023 04:27 AM      Lab Results   Component Value Date/Time    CHOLSTRLTOT 147 01/31/2023 04:27 AM    LDL Comment 07/16/2018 08:37 AM    HDL 30 (L) 01/31/2023 04:27 AM    TRIGLYCERIDE 210 (H) 01/31/2023 04:27 AM       Lab Results   Component Value Date/Time    ALKPHOSPHAT 48 01/15/2024 03:38 AM    ASTSGOT 17 01/15/2024 03:38 AM    ALTSGPT 18 01/15/2024 03:38 AM    TBILIRUBIN 0.3 01/15/2024 03:38 AM        Imaging/Testing:    I interpreted and/or reviewed the patient's neuroimaging    DX-CHEST-PORTABLE (1 VIEW)   Final Result         1.  No acute cardiopulmonary disease.      DX-CHEST-PORTABLE (1 VIEW)   Final Result         1.  No acute cardiopulmonary disease.      DX-CHEST-PORTABLE (1 VIEW)   Final Result         1.  No acute cardiopulmonary disease.      DX-CHEST-PORTABLE (1 VIEW)   Final Result         1.  No acute cardiopulmonary disease.      DX-CHEST-PORTABLE (1 VIEW)   Final Result      OUTSIDE IMAGES-CT HEAD   Final Result      OUTSIDE IMAGES-DX CHEST   Final Result      OUTSIDE IMAGES-DX CHEST   Final Result      MR-FOREIGN FILM MRI   Final Result          Impression and Recommendations:    # Unresponsiveness  #Coma after prolonged hypoglycemia  Prior to presentation at our facility, the patient presented to an outside facility after being found to have a glucose of 22.  MRI at outside facility reported to be concerning for \"likely hypoglycemic encephalopathy\".Patient's current mentation is likely medication induced (patient is on 70 " units of insulin at home and glimepiride 4 mg twice daily).  On day 3 patient was started on octreotide for possible sulfonylurea overdose; octreotide was stopped on 1/16/2024, there is no significant improvement or change in patient's mentation.  Patient continues to have poor localization to pain after day 6 of hospitalization. Prognosis remains guarded, 14 days of treatment is needed before full prognosis can be determined.  - Continue neurochecks  - Avoid blood glucose level > 180, given the risk of glucose reperfusion injury  - Hold repeat Brain MRI pending family meeting and family preference.   - Keppra 1000 mg twice daily  - Seizure precautions      Yogesh Carmen MD  Neurohospitalist, Acute Care Services          Please note that this dictation was created using voice recognition software.  I have made every reasonable attempt to correct obvious errors, but I expect that there are errors of grammar and possibly content that I did not discover before finalizing the note.

## 2024-01-19 NOTE — CONSULTS
Inpatient Palliative Medicine Evaluation     Pradip Baez  73 y.o. male  [id number]  Mayur Dorsey M.D.  Location: [facility]  Referral Source:   Juan Jose Suarez M.d.         Reason for palliative medicine consultation and/or visit: goals of care     Assessment and Plan:     GOAL(S) OF CARE = family expressed that Pradip would not find his current functional state as an acceptable quality of life, they are hopeful for a miracle but want to honor what he would have wanted and proceed with comfort care on 1/22/24    SYMPTOMS ETIOLOGY/CAUSES = coma after prolonged hypoglycemia    PROGNOSIS = poor, plan to proceed with comfort care on 1/22/24    CODE STATUS = DNAR, I ok    ADVANCE CARE PLANNING = Keely (wife) is health care power of  and relayed that Pradip would not want life sustaining treatments in his current state    PALLIATIVE CARE TEAM INTERVENTIONS = family meeting, discussed advance care planning and goals of care, outlined what transition to comfort care would look like          Summary: Pradip Baez is a 73-year-old male with a history of CAD s/p PCI with 2 vessel CABG, T2D (on Lantus 70U QHS, Humalog TID, ozempic and glimeperide), HTN, HLD who was transferred to [facility] on 1/14/24 with minimally responsive state of consciousness with severe hypoglycemia. Palliative care was consulted on 1/18 to discuss goals of care.     Had family meeting today with Pradip's wife, daughter, son and son-in-law. We discussed Pradip's hospital course and current status. We discussed how his current neurologic state was likely due to a prolonged episode of severe hypoglycemia and how there had not been significant improvement in his mentation thus far in his hospital stay. Family related that Pradip would not find his current functional state as an acceptable quality of life. They were hopeful for a miracle, but understood that return of neurologic function was unlikely. They wanted to honor what Pradip would have wanted and  had therefore decided that they would proceed with comfort care on Monday, 1/22/24. They wanted to have their  come and pray with them. They were agreeable to our  visiting as well.     Discussed what comfort care would look like. Family is all in agreement to proceed with comfort care on 1/22. Family knows that critical care team can manage comfort care. Palliative care will plan to check on family on Monday as well.      Advance care planning:  The patient and/or legal decision maker has provided voluntary consent to discuss advance care planning. We discussed code status.     Total time spent in ACP discussion 30 minutes, which is separate from the time spent completing the evaluation and management visit.      I spent a total of 60 minutes reviewing medical records, direct face-to-face time with the patient and/or family, documentation and coordination of care. This is separate from the time spent on advance care planning, which is documented above.

## 2024-01-19 NOTE — CARE PLAN
Problem: Ventilation  Goal: Ability to achieve and maintain unassisted ventilation or tolerate decreased levels of ventilator support  Description: Target End Date:  4 days     Document on Vent flowsheet    1.  Support and monitor invasive and noninvasive mechanical ventilation  2.  Monitor ventilator weaning response  3.  Perform ventilator associated pneumonia prevention interventions  4.  Manage ventilation therapy by monitoring diagnostic test results  Outcome: Progressing     Ventilator Daily Summary    Vent Day #6    Airway: ETT 7.5@24    Ventilator settings: 14/420/8/30    Weaning trials: no    Respiratory Procedures: no     Plan: Continue current ventilator settings and wean mechanical ventilation as tolerated per physician orders.

## 2024-01-19 NOTE — CARE PLAN
Problem: Ventilation  Goal: Ability to achieve and maintain unassisted ventilation or tolerate decreased levels of ventilator support  Description: Target End Date:  4 days     Document on Vent flowsheet    1.  Support and monitor invasive and noninvasive mechanical ventilation  2.  Monitor ventilator weaning response  3.  Perform ventilator associated pneumonia prevention interventions  4.  Manage ventilation therapy by monitoring diagnostic test results  Outcome: Not Met   Vent Day 5  CMV: 14, 420, 8, 30%  No Spont

## 2024-01-19 NOTE — DISCHARGE PLANNING
Case Management Discharge Planning    Admission Date: 1/14/2024  GMLOS: 4.9  ALOS: 5    6-Clicks ADL Score: 6  6-Clicks Mobility Score: 6  PT and/or OT Eval ordered: No  Post-acute Referrals Ordered: No  Post-acute Choice Obtained: No  Has referral(s) been sent to post-acute provider:  No      Anticipated Discharge Disposition: D/T to SNF with Medicare cert in anticipation of skilled care (03)    DME Needed: No    Action(s) Taken: Chart reviewed and case discussed in ICU rounds this morning:  Patient with transfer back agreement with [facility] however, wife not agreeable to transfer back.    +Vent (day 7)/insulin drip/Q4H neurochecks/NGT.   Palliative consulted for GOC discussion.   Neurology following.    CM met at bedside with patient's wife/Keely and daughter/Bridgett to complete assessment and discuss DCP.   Patient & wife live in a two-story [facility], [place] [address]).   Prior to admission patient was independent with ADL, driving, and ambulating w/o assistive devices.  No DME used.  No Hx of ETOH or drug abuse.   Patient is a practicing dentist.    Per wife & daughter, plan is for extubation on Monday 1/22/2024 to allow sufficient time for family members to arrive.    Escalations Completed: None    Medically Clear: No    Next Steps: CM remains available for assistance with DCP    Barriers to Discharge: Medical clearance, pending family members arrival for extubation    Is the patient up for discharge tomorrow:  No      Care Transition Team Assessment    Information Source  Orientation Level: Unable to assess (Intubated)  Information Given By: Relative, Spouse (Wife & daughter)  Informant's Name: Steff Baez  Who is responsible for making decisions for patient? : Spouse  Name(s) of Primary Decision Maker: Keely Baez    Readmission Evaluation  Is this a readmission?: Yes - planned readmission (Transferred from [facility])    Elopement Risk  Legal Hold:  No  Ambulatory or Self Mobile in Wheelchair: No-Not an Elopement Risk  Elopement Risk: Not at Risk for Elopement     Discharge Preparedness  What is your plan after discharge?: Uncertain - pending medical team collaboration  What are your discharge supports?: Spouse, Child  Prior Functional Level: Ambulatory, Drives Self, Independent with Activities of Daily Living    Functional Assesment  Prior Functional Level: Ambulatory, Drives Self, Independent with Activities of Daily Living    Finances  Financial Barriers to Discharge: No  Prescription Coverage: Yes     Advance Directive  Advance Directive?: None (none on file)    Domestic Abuse  Have you ever been the victim of abuse or violence?: Not Sure (terri)  Physical Abuse or Sexual Abuse: Unable to Assess due to Patient Condition  Verbal Abuse or Emotional Abuse: Unable to Assess due to Patient Condition  Possible Abuse/Neglect Reported to:: Not Applicable    Psychological Assessment  History of Substance Abuse: None  History of Psychiatric Problems: No  Non-compliant with Treatment: No  Newly Diagnosed Illness: Yes    Discharge Risks or Barriers  Discharge risks or barriers?: Complex medical needs  Patient risk factors: Cognitive / sensory / physical deficit, Complex medical needs    Anticipated Discharge Information  Discharge Disposition: D/T to SNF with Medicare cert in anticipation of skilled care (03)

## 2024-01-20 NOTE — CARE PLAN
Problem: Ventilation  Goal: Ability to achieve and maintain unassisted ventilation or tolerate decreased levels of ventilator support  Description: Target End Date:  4 days     Document on Vent flowsheet    1.  Support and monitor invasive and noninvasive mechanical ventilation  2.  Monitor ventilator weaning response  3.  Perform ventilator associated pneumonia prevention interventions  4.  Manage ventilation therapy by monitoring diagnostic test results  Outcome: Not Progressing   No SBT p t does not meet criteria at this time

## 2024-01-20 NOTE — PROCEDURES
CONTINUOUS VIDEO ELECTROENCEPHALOGRAM REPORT    REFERRING PROVIDER: Dr. Carmen  DOS: 1/20/2024  ROOM: [facility]/   TOTAL RECORDING TIME: 18 hours and 30 minutes of total recording time    INDICATION:  Pradip Baez 73 y.o. male presenting with possible seizure(s), altered mental status, and body jerking/shaking    CURRENT ANTI-SEIZURE MEDICATIONS AND OTHER RELEVANT TREATMENTS:   lansoprazole, 30 mg, BID  levETIRAcetam, 1,000 mg, BID  scopolamine, 1 Patch, Q72HRS  Pharmacy, 1 Each, PHARMACY TO DOSE  [Held by provider] senna-docusate, 2 Tablet, BID  MD Alert...Adult ICU Electrolyte Replacement per Pharmacy, , PHARMACY TO DOSE  metoprolol tartrate, 25 mg, BID  enoxaparin (LOVENOX) injection, 40 mg, DAILY AT 1800  atorvastatin, 80 mg, Q EVENING  aspirin, 81 mg, DAILY      insulin regular (Humulin R) 100 Units in  mL Infusion, Last Rate: 9.5 Units/hr (01/21/24 0910)        TECHNIQUE:   CVEEG was set up by a Neurodiagnostic technologist who performed education to the patient and staff. A minimum of 23 electrodes and 23 channel recording was setup and performed by Neurodiagnostic technologist, in accordance with the international 10-20 system. Impedence, electrode integrity, and technical impressions were documented a minimum of every 2-24 hour period by a Neurodiagnostic Technologist and reviewed by Interpreting Physician. The study was reviewed in bipolar and referential montages. The recording examined the patient in the awake, drowsy, and sleep state(s).     DESCRIPTION OF THE RECORD:  EEG background: During maximal wakefulness, the background was continuous, intermittently asymmetrical, and poorly defined and/or fragmented 5-6 Hz posterior dominant rhythm, with a mixture of mostly theta and delta activity.  Reactivity and state changes were present.  During drowsiness, a loss of myogenic artifact and theta/delta frequencies were seen.     Sleep was captured and was characterized by diffuse background  delta/theta activity with a loss of myogenic artifact.  N2 sleep transients in the form of sleep spindles and vertex waves were seen in the leads over the central regions.  and Occasional N2 sleep transients in the form of rudimentary and/or ill-defined sleep spindles fragments and vertex waves were seen in the leads over the central regions.     ICTAL AND INTERICTAL FINDINGS:   No focal or generalized epileptiform activity noted.     There was intermittent multifocal slowing, at times exhibiting an alternating asymmetry over the left or right hemisphere. However, No persistent regional slowing was seen during this routine study.      No electroclinical or electrographic seizures were reported or recorded during the study.     ACTIVATION PROCEDURES:   NA    EKG: Sampling of the EKG recording showed sinus rhythm    EVENTS:      No clinical events were captured or reported.      INTERPRETATION:  Abnormal continuous video EEG recording in the awake and drowsy/sleep state(s):  -Moderate background slowing suggestive of diffuse/multifocal cerebral dysfunction and consistent with a non-specific encephalopathy. Clinical correlation recommended.  -No persistent focal or regional slowing and no background asymmetries were seen.   -No definitive epileptiform discharges were seen.  -No definitive seizures.   -Clinical Events: None reported or seen on intermittent video review      Findings discussed with Dr. Kermit Nava MD  Department of Neurology at [facility]  General Neurologist and Epileptologist  Director of [facility]'s Level III Comprehensive Epilepsy Program  Professor of Clinical Neurology, [facility].   Phone: [phone]  Fax: [phone]  E-mail: [email or web address]

## 2024-01-20 NOTE — PROGRESS NOTES
"Critical Care Progress Note    Date of admission  1/14/2024    Chief Complaint  Altered Mental Status with seizure-like activity.    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 Pylori who transferred here with seizures requiring cEEG. Per wife, patient began exhibiting AMS on 1/11. On 1/12 patient was not improving and wife called EMS. Upon arrival of EMS, patients glucose was 22 and he was given 25 gm Dextrose with no improvement in mentation. He was brought to [facility] ED and was emergently intubated for hypoxia and airway protection. 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 Neurology was consulted and recommended 24 hour EEG which is not available at [facility].     Per epileptologist interpreting the EEG 1/16: \"Findings suggest a predisposition for seizures to arise from the left hemisphere, with no seizures observed. There is a resolution of this finding, which in the context of anti-seizure medication treatment, suggests their origin was epileptiform.      In addition, there is focal left hemispheric dysfunction relative to the right, a finding which may be seen in the setting of a structural abnormality, postictally, or in other instances that would confer left hemispheric dysfunction. Clinical correlation recommended. There is also evidence of mild to moderate diffuse cerebral dysfunction of a nonspecific etiology, with superimposed sedative/medication effects\".     1/16 - stopped cEEG. Vent day  #4. No purposeful movements, triple flexion response BLE only. (+) cough/gag/corneals. DNR, I OK.  1/17 -vent day #5.  No significant neurological changes  1/18 - Vent day #6. No significant neurological changes  1/19 - Vent day #7. " Palliative Care consult. Plan for comfort care Monday 1/22.     Interval Problem Update  Reviewed last 24 hour events:  No overnight events.  Chemstick 132-150's.  Actively titrating insulin drip for goal glucose 140-180  Tmax 100.6  SR 80s to 90s  SBP 110s to 130s  Vent day #8: APVC 14/420/8/30%  Neuro: No purposeful movements. Not following. PERRLA 5mm. Triple flexion LLE.  Tube feeding at goal  I/O 1900/1400  Mcbride, PICC  Mobility 1 -not eligible to advance due to unresponsive    Family at bedside. Now wanting repeat MRI and EEG and to wait full 14 days prior to comfort care measures. Discussed with Dr. Carmen from Neurology who also came to speak to family at bedside.     Review of Systems  Review of Systems   Unable to perform ROS: Intubated        Vital Signs for last 24 hours   Pulse:  [] 95  Resp:  [12-23] 22  BP: (112-130)/(60-73) 125/67  SpO2:  [96 %-100 %] 97 %    Hemodynamic parameters for last 24 hours       Respiratory Information for the last 24 hours  Vent Mode: APVCMV  Rate (breaths/min): 14  Vt Target (mL): 420  PEEP/CPAP: 8  MAP: 12  Control VTE (exp VT): 427    Physical Exam   Physical Exam  Vitals and nursing note reviewed.   Constitutional:       General: He is not in acute distress.     Appearance: Normal appearance. He is ill-appearing.      Interventions: He is intubated.   HENT:      Head: Normocephalic.      Nose: Nose normal.      Mouth/Throat:      Lips: Pink.      Mouth: Mucous membranes are moist.   Eyes:      Pupils: Pupils are equal, round, and reactive to light.   Cardiovascular:      Rate and Rhythm: Normal rate and regular rhythm.      Pulses: Normal pulses.      Heart sounds: Normal heart sounds.   Pulmonary:      Effort: Pulmonary effort is normal. He is intubated.      Breath sounds: No wheezing or rhonchi.   Abdominal:      General: Bowel sounds are normal.      Palpations: Abdomen is soft.   Musculoskeletal:      Right lower leg: No edema.      Left lower leg: No edema.    Skin:     General: Skin is warm and dry.   Neurological:      GCS: GCS eye subscore is 1. GCS verbal subscore is 1. GCS motor subscore is 1.      Comments: 3T  Triple flexion BLE  No spontaneous movements  BUE flaccid   Psychiatric:      Comments: Intubated         Medications  Current Facility-Administered Medications   Medication Dose Route Frequency Provider Last Rate Last Admin    lansoprazole (Prevacid) solutab 30 mg  30 mg Enteral Tube BID Tammy Morton A.P.R.N.        insulin regular (Humulin R) 100 Units in  mL Infusion  0-29 Units/hr Intravenous Continuous Tammy Morton A.P.R.N. 6.5 mL/hr at 01/20/24 1208 6.5 Units/hr at 01/20/24 1208    dextrose 50% (D50W) injection 12.5-25 g  12.5-25 g Intravenous PRN Tammy Morton, A.P.R.N.        levETIRAcetam (Keppra) tablet 1,000 mg  1,000 mg Enteral Tube BID Juan Jose Suarez M.D.   1,000 mg at 01/20/24 0515    scopolamine (Transderm-Scop) patch 1 Patch  1 Patch Transdermal Q72HRS Tammy Morton, A.P.R.N.   1 Patch at 01/17/24 1340    labetalol (Normodyne/Trandate) injection 10-20 mg  10-20 mg Intravenous Q4HRS PRN Tammy Morton, A.P.R.N.   10 mg at 01/16/24 1612    hydrALAZINE (Apresoline) injection 10-20 mg  10-20 mg Intravenous Q4HRS PRN Tammy Mortno, A.P.R.N.        Pharmacy Consult: Enteral tube insertion - review meds/change route/product selection  1 Each Other PHARMACY TO DOSE Tammy Morton A.P.R.N.        Respiratory Therapy Consult   Nebulization Continuous RT Victorina Christianson M.D.        [Held by provider] senna-docusate (Pericolace Or Senokot S) 8.6-50 MG per tablet 2 Tablet  2 Tablet Enteral Tube BID Victorina Christianson M.D.   2 Tablet at 01/16/24 1800    And    [Held by provider] polyethylene glycol/lytes (Miralax) Packet 1 Packet  1 Packet Enteral Tube QDAY PRN Victorina Christianson M.D.        And    [Held by provider] magnesium hydroxide (Milk Of Magnesia) suspension 30 mL  30 mL Enteral Tube QDAY PRN Victorina Christianson M.D.        And     [Held by provider] bisacodyl (Dulcolax) suppository 10 mg  10 mg Rectal QDAY PRN Victorina Christianson M.D. MD Alert...ICU Electrolyte Replacement per Pharmacy   Other PHARMACY TO DOSE Victorina Christianson M.D.        lidocaine (Xylocaine) 1 % injection 2 mL  2 mL Tracheal Tube Q30 MIN PRN Victorina Christianson M.D.        ipratropium-albuterol (DUONEB) nebulizer solution  3 mL Nebulization Q2HRS PRN (RT) Victorina Christianson M.D.        metoprolol tartrate (Lopressor) tablet 25 mg  25 mg Enteral Tube BID Victorina Christianson M.D.   25 mg at 01/20/24 0516    enoxaparin (Lovenox) inj 40 mg  40 mg Subcutaneous DAILY AT 1800 Victorina Christianson M.D.   40 mg at 01/19/24 1724    ondansetron (Zofran) syringe/vial injection 4 mg  4 mg Intravenous Q4HRS PRN Victorina Christianson M.D.        acetaminophen (Tylenol) tablet 650 mg  650 mg Enteral Tube Q6HRS PRN Victorina Christianson M.D.   650 mg at 01/19/24 1154    ondansetron (Zofran ODT) dispertab 4 mg  4 mg Enteral Tube Q4HRS PRN Victorina Christianson M.D.        atorvastatin (Lipitor) tablet 80 mg  80 mg Enteral Tube Q EVENING Victorina Christianson M.D.   80 mg at 01/19/24 1724    aspirin (Asa) chewable tab 81 mg  81 mg Enteral Tube DAILY Victoirna Christianson M.D.   81 mg at 01/20/24 0515       Fluids    Intake/Output Summary (Last 24 hours) at 1/20/2024 1327  Last data filed at 1/20/2024 1200  Gross per 24 hour   Intake 1910.54 ml   Output 1520 ml   Net 390.54 ml       Laboratory          Recent Labs     01/18/24  0400 01/19/24  0301 01/20/24  0340   SODIUM 135 137 136   POTASSIUM 4.8 4.3 4.2   CHLORIDE 100 101 99   CO2 25 27 27   BUN 20 25* 25*   CREATININE 1.10 1.01 1.05   CALCIUM 8.9 8.9 8.9     Recent Labs     01/18/24  0400 01/19/24  0301 01/20/24  0340   GLUCOSE 379* 157* 129*     Recent Labs     01/18/24  0400 01/19/24  0301 01/20/24  0340   WBC 9.5 13.6* 13.8*   NEUTSPOLYS 71.70 73.20* 71.80   LYMPHOCYTES 11.10* 10.20* 10.80*   MONOCYTES 12.70 11.30 11.40   EOSINOPHILS 1.00 0.80 0.90    BASOPHILS 0.70 0.70 0.70     Recent Labs     01/18/24  0400 01/19/24  0301 01/20/24  0340   RBC 3.71* 3.70* 3.51*   HEMOGLOBIN 11.1* 11.2* 10.6*   HEMATOCRIT 33.7* 33.3* 31.9*   PLATELETCT 449* 429 384       Imaging  No new imaging today.    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  Suggest hypoglycemia due to possible sulfonylurea overdose.  -Seizure Precautions  -continue Keppra    GIB (gastrointestinal bleeding)- (present on admission)  Assessment & Plan  -EGD 1/2/2024 showed H. pylori, patient was sent home on PPI, bismuth, tetracycline, and metronidazole for 14 days  -suspected, mild  -Hgb stable  -gastric occult (+)  -PPI  -consider GI consult if worsens    On mechanically assisted ventilation (HCC)- (present on admission)  Assessment & Plan  -Intubated for low GCS, not protecting airway, and hypoxia  -Intubation date 1/12  -Ventilator dependent respiratory failure  -Modify ventilator to optimize oxygenation, acid-base balance and ventilation  -CXR as indicated: monitor lung volumes and tube/line placement  -HOB > 30  -Titrate FiO2 to keep sats greater than 92%  -Chlorhexidine  -goal CO2 35-40  -Daily awakening and SBT trials unless contraindicated  -ABCDEF bundle  -I am actively adjusting ventilator based on clinical indicators and ABG's      Acute encephalopathy- (present on admission)  Assessment & Plan  -Metabolic-->Concerning for hypoglycemic event at home vs anoxic brain injury vs seizures  -Keep patient awake during the day and avoid daytime naps. Remove all unnecessary lines (central lines, peripheral IVs, feeding tubes, yadav catheters). Avoid polypharmacy, frequent re-orientation, maximize family time at bedside, use glasses and hearing aids if needed, treat pain, encourage  "ambulation, minimize benzos/anticholinergic agents.   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 German    S/P CABG x 2- (present on admission)  Assessment & Plan  -History of 2015  -Continue statin and aspirin    Essential hypertension- (present on admission)  Assessment & Plan  -SBP goals < 160  -PRN's available    Multiple vessel coronary artery disease- (present on admission)  Assessment & Plan  -S/p Stent placement followed by CABG 2015    Type 2 diabetes mellitus without complication, without long-term current use of insulin (HCC)- (present on admission)  Assessment & Plan  -Hypoglycemia episode prompting EMS activation and admission to outside facility  -Possible sulfonylurea overdose -octreotide every 6 hours per neurology recommendations x 24 hours with no significant improvement. Stopped 1/16  -Hypoglycemia protocol  -Dietary consult for tube feeding recommendations  -try and avoid BG > 180 given risk for glucose reperfusion injury  -Insulin drip 140-180 protocol         VTE:  Lovenox  Ulcer: PPI  Lines: Mcbride Catheter  Ongoing indication addressed    I have performed a physical exam and reviewed and updated ROS and Plan today (1/20/2024). In review of yesterday's note (1/19/2024), there are no changes except as documented above.     Discussed patient condition and risk of morbidity and/or mortality with Family, RN, RT, Pharmacy, neurology, and my attending Dr. Suarez  The patient remains critically ill.  Critical care time = 45 minutes in directly providing and coordinating critical care and extensive data review.  No time overlap and excludes procedures.    Please note that this " dictation was created using voice recognition software. I have made every reasonable attempt to correct obvious errors, but there may be errors of grammar and possibly content that I did not discover before finalizing the note.    YOLANDA Vragas.

## 2024-01-20 NOTE — CARE PLAN
Problem: Ventilation  Goal: Ability to achieve and maintain unassisted ventilation or tolerate decreased levels of ventilator support  Description: Target End Date:  4 days     Document on Vent flowsheet    1.  Support and monitor invasive and noninvasive mechanical ventilation  2.  Monitor ventilator weaning response  3.  Perform ventilator associated pneumonia prevention interventions  4.  Manage ventilation therapy by monitoring diagnostic test results  Outcome: Progressing     Ventilator Daily Summary    Vent Day #7    Airway: 7.5 @24    Ventilator settings: 14/420/8/30     Weaning trials: no    Respiratory Procedures: no     Plan: Continue current ventilator settings and wean mechanical ventilation as tolerated per physician orders.

## 2024-01-20 NOTE — CARE PLAN
The patient is Watcher - Medium risk of patient condition declining or worsening    Shift Goals  Clinical Goals: stable blood glucose  Patient Goals: terri  Family Goals: terri    Progress made toward(s) clinical / shift goals:      Problem: Fall Risk  Goal: Patient will remain free from falls  Outcome: Progressing     Problem: Hemodynamics  Goal: Patient's hemodynamics, fluid balance and neurologic status will be stable or improve  Outcome: Progressing     Problem: Nutrition  Goal: Enteral nutrition will be maintained or improve  Outcome: Progressing     Problem: Bowel Elimination  Goal: Establish and maintain regular bowel function  Outcome: Progressing     Problem: Pain - Standard  Goal: Alleviation of pain or a reduction in pain to the patient’s comfort goal  Outcome: Progressing       Patient is not progressing towards the following goals: N/A

## 2024-01-20 NOTE — PROGRESS NOTES
Patient experiencing frequent movements seemingly in a pattern, APRN to bedside to assess patient. Neurologist Dr. Carmen to bedside to assess patient. New orders placed for EEG.

## 2024-01-20 NOTE — PROGRESS NOTES
Referring Physician: Juan Jose Suarez M.D.    S:  No neurologic changes as per RN. Palliative consultation yesterday with family meeting.    O:    Vitals:    01/20/24 0800   BP: 128/65   Pulse: 88   Resp: 14   Temp:    SpO2: 99%     No sedation. Intubated and Ventilated.    Level of consciousness: Withdrawals in the bilateral lower extremities. Opens eyes to noxious stimuli most notably during testing the plantar responses.  Does not follow commands.    Pupils: Reactive to bright light (OU)  Oculomotor: Conjugate. No gaze deviation. No nystagmus.  Blink to threat: Absent    Face appears symmetric    Motor: No spontaneous movements. No abnormal postures or movements.  Deep tendon reflexes: Not tested.   Plantar responses: Triple flexion    No interval neuroimaging or neurodiagnostic studies.    Impression & Recommendations: Comatose after severe, prolonged hypoglycemia.  Opens eyes reproducibly during testing plantar responses this morning.  Does not follow commands.  No new neurology recommendations at this time.  Continue to follow clinically with serial neurologic examinations. Avoid sedation. Continue outstanding supportive and medical care.  Palliative care is involved.  Neurology will follow along.     I provided a total of 30 minutes of acute neurologic care for this patient, review medical records, diagnostic studies, direct face-to-face time with the patient, documentation and communicating plan of care.      Yogesh Carmen MD  Neurohospitalist, Acute Care Services          Please note that this dictation was created using voice recognition software.  I have made every reasonable attempt to correct obvious errors, but I expect that there are errors of grammar and possibly content that I did not discover before finalizing the note.

## 2024-01-20 NOTE — CARE PLAN
The patient is Watcher - Medium risk of patient condition declining or worsening    Shift Goals  Clinical Goals: Stable blood glucose& Neuro check  Patient Goals: ANDREW  Family Goals: ANDREW    Progress made toward(s) clinical / shift goals:    Problem: Skin Integrity  Goal: Skin integrity is maintained or improved  Outcome: Progressing     Problem: Fall Risk  Goal: Patient will remain free from falls  Outcome: Progressing     Problem: Venous Thromboembolism (VTE) Prevention  Goal: The patient will remain free from venous thromboembolism (VTE)  Outcome: Progressing     Problem: Nutrition  Goal: Enteral nutrition will be maintained or improve  Outcome: Progressing       Patient is not progressing towards the following goals:

## 2024-01-20 NOTE — EEG PROGRESS NOTE
Pt Rehooked for LTM VEEG with CT compatible and MRI conditional leads.    - These leads can go to CT.  - These leads can go to MRI.

## 2024-01-21 NOTE — CARE PLAN
The patient is Watcher - Medium risk of patient condition declining or worsening    Shift Goals  Clinical Goals: Maintain Skin Integrity  Patient Goals: ANDREW  Family Goals: ANDREW    Progress made toward(s) clinical / shift goals:    Problem: Skin Integrity  Goal: Skin integrity is maintained or improved  Outcome: Progressing     Problem: Hemodynamics  Goal: Patient's hemodynamics, fluid balance and neurologic status will be stable or improve  Outcome: Progressing     Problem: Pain - Standard  Goal: Alleviation of pain or a reduction in pain to the patient’s comfort goal  Outcome: Progressing       Patient is not progressing towards the following goals:

## 2024-01-21 NOTE — PROGRESS NOTES
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 Pylori who transferred here with seizures requiring cEEG. Per wife, patient began exhibiting AMS on 1/11. On 1/12 patient was not improving and wife called EMS. Upon arrival of EMS, patients glucose was 22 and he was given 25 gm Dextrose with no improvement in mentation. He was brought to [facility] ED and was emergently intubated for hypoxia and airway protection. 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 Neurology was consulted and recommended 24 hour EEG which is not available at [facility].     Per epileptologist interpreting the EEG 1/16: \"Findings suggest a predisposition for seizures to arise from the left hemisphere, with no seizures observed. There is a resolution of this finding, which in the context of anti-seizure medication treatment, suggests their origin was epileptiform.      In addition, there is focal left hemispheric dysfunction relative to the right, a finding which may be seen in the setting of a structural abnormality, postictally, or in other instances that would confer left hemispheric dysfunction. Clinical correlation recommended. There is also evidence of mild to moderate diffuse cerebral dysfunction of a nonspecific etiology, with superimposed sedative/medication effects\".     1/16 - stopped cEEG. Vent day  #4. No purposeful movements, triple flexion response BLE only. (+) cough/gag/corneals. DNR, I OK.  1/17 -vent day #5.  No significant neurological changes  1/18 - Vent day #6. No significant neurological changes  1/19 - Vent day #7. Palliative Care consult. " Plan for comfort care Monday 1/20 - Vent day #8. Family now wanting to wait 14 days (1/26) from injury to transition to comfort care.     Interval Problem Update  Reviewed last 24 hour events:  No clinical seizures.  No evidence of seizures on cEEG.  Okay to discontinue  Tmax 100.6  SR 90s SBP 120s to 140s  Vent day #9: APVC 14/420/8/30%  SBT: failed  No pain meds, no sedation. RASS -4.  Neuro: triple flexion LLE, no responses RLE/RUE/LUE. ONELIA.  TF @ goal. BMS with dark brown stool.  Actively titrating insulin drip  TED Mcbride  I/O: 1900/1400  Mobility 1 - not eligible to advance due to altered mental status.    Review of Systems  Review of Systems   Unable to perform ROS: Intubated        Vital Signs for last 24 hours   Pulse:  [] 99  Resp:  [] 15  BP: (105-141)/(56-76) 121/73  SpO2:  [97 %-99 %] 98 %    Hemodynamic parameters for last 24 hours       Respiratory Information for the last 24 hours  Vent Mode: APVCMV  Rate (breaths/min): 14  Vt Target (mL): 420  PEEP/CPAP: 8  P Support: 5  MAP: 12  Control VTE (exp VT): 408    Physical Exam   Physical Exam  Vitals and nursing note reviewed.   Constitutional:       General: He is not in acute distress.     Appearance: Normal appearance. He is ill-appearing.      Interventions: He is intubated.   HENT:      Head: Normocephalic.      Nose: Nose normal.      Mouth/Throat:      Lips: Pink.      Mouth: Mucous membranes are moist.   Eyes:      Pupils: Pupils are equal, round, and reactive to light.   Cardiovascular:      Rate and Rhythm: Normal rate and regular rhythm.      Pulses: Normal pulses.      Heart sounds: Normal heart sounds.   Pulmonary:      Effort: Pulmonary effort is normal. He is intubated.      Breath sounds: No wheezing or rhonchi.   Abdominal:      General: Bowel sounds are normal.      Palpations: Abdomen is soft.   Musculoskeletal:      Right lower leg: No edema.      Left lower leg: No edema.   Skin:     General: Skin is warm and dry.    Neurological:      GCS: GCS eye subscore is 1. GCS verbal subscore is 1. GCS motor subscore is 1.      Comments: 3T  Triple flexion BLE  No spontaneous movements  BUE flaccid   Psychiatric:      Comments: Intubated         Medications  Current Facility-Administered Medications   Medication Dose Route Frequency Provider Last Rate Last Admin    lansoprazole (Prevacid) solutab 30 mg  30 mg Enteral Tube BID Tammy Morton, A.P.R.N.   30 mg at 01/21/24 0514    insulin regular (Humulin R) 100 Units in  mL Infusion  0-29 Units/hr Intravenous Continuous Tammy Morton, A.P.R.N. 9 mL/hr at 01/21/24 1213 9 Units/hr at 01/21/24 1213    dextrose 50% (D50W) injection 12.5-25 g  12.5-25 g Intravenous PRN Tammy Morton, A.P.R.N.        levETIRAcetam (Keppra) tablet 1,000 mg  1,000 mg Enteral Tube BID Juan Jose Suarez M.D.   1,000 mg at 01/21/24 0515    scopolamine (Transderm-Scop) patch 1 Patch  1 Patch Transdermal Q72HRS Tammy Morton, A.P.R.N.   1 Patch at 01/20/24 1405    labetalol (Normodyne/Trandate) injection 10-20 mg  10-20 mg Intravenous Q4HRS PRN Tammy Morton, A.P.R.N.   10 mg at 01/16/24 1612    hydrALAZINE (Apresoline) injection 10-20 mg  10-20 mg Intravenous Q4HRS PRN Tammy Morton, A.P.R.N.        Pharmacy Consult: Enteral tube insertion - review meds/change route/product selection  1 Each Other PHARMACY TO DOSE Tammy Morton, A.P.R.N.        Respiratory Therapy Consult   Nebulization Continuous RT Victorina Christianson M.D.        [Held by provider] senna-docusate (Pericolace Or Senokot S) 8.6-50 MG per tablet 2 Tablet  2 Tablet Enteral Tube BID Victorina Christianson M.D.   2 Tablet at 01/16/24 1800    And    [Held by provider] polyethylene glycol/lytes (Miralax) Packet 1 Packet  1 Packet Enteral Tube QDAY PRN Victorina Christianson M.D.        And    [Held by provider] magnesium hydroxide (Milk Of Magnesia) suspension 30 mL  30 mL Enteral Tube QDAY PRN Victorina Christianson M.D.        And    [Held by provider]  bisacodyl (Dulcolax) suppository 10 mg  10 mg Rectal QDAY PRN Victorina Christianson M.D.        MD Alert...ICU Electrolyte Replacement per Pharmacy   Other PHARMACY TO DOSE Victorina Christianson M.D.        lidocaine (Xylocaine) 1 % injection 2 mL  2 mL Tracheal Tube Q30 MIN PRN Victorina Christianson M.D.        ipratropium-albuterol (DUONEB) nebulizer solution  3 mL Nebulization Q2HRS PRN (RT) Victorina Christianson M.D.        metoprolol tartrate (Lopressor) tablet 25 mg  25 mg Enteral Tube BID Victorina Christianson M.D.   25 mg at 01/21/24 0515    enoxaparin (Lovenox) inj 40 mg  40 mg Subcutaneous DAILY AT 1800 Victorina Christianson M.D.   40 mg at 01/20/24 1719    ondansetron (Zofran) syringe/vial injection 4 mg  4 mg Intravenous Q4HRS PRN Victorina Christianson M.D.        acetaminophen (Tylenol) tablet 650 mg  650 mg Enteral Tube Q6HRS PRN Victorina Christianson M.D.   650 mg at 01/21/24 1050    ondansetron (Zofran ODT) dispertab 4 mg  4 mg Enteral Tube Q4HRS PRN Victorina Christianson M.D.        atorvastatin (Lipitor) tablet 80 mg  80 mg Enteral Tube Q EVENING Victorina Christianson M.D.   80 mg at 01/20/24 1719    aspirin (Asa) chewable tab 81 mg  81 mg Enteral Tube DAILY Victorina Christianson M.D.   81 mg at 01/21/24 0514       Fluids    Intake/Output Summary (Last 24 hours) at 1/21/2024 1307  Last data filed at 1/21/2024 0800  Gross per 24 hour   Intake 1585.28 ml   Output 1085 ml   Net 500.28 ml       Laboratory          Recent Labs     01/19/24  0301 01/20/24  0340   SODIUM 137 136   POTASSIUM 4.3 4.2   CHLORIDE 101 99   CO2 27 27   BUN 25* 25*   CREATININE 1.01 1.05   CALCIUM 8.9 8.9     Recent Labs     01/19/24  0301 01/20/24  0340   GLUCOSE 157* 129*     Recent Labs     01/19/24  0301 01/20/24  0340   WBC 13.6* 13.8*   NEUTSPOLYS 73.20* 71.80   LYMPHOCYTES 10.20* 10.80*   MONOCYTES 11.30 11.40   EOSINOPHILS 0.80 0.90   BASOPHILS 0.70 0.70     Recent Labs     01/19/24  0301 01/20/24  0340   RBC 3.70* 3.51*   HEMOGLOBIN 11.2* 10.6*   HEMATOCRIT 33.3*  "31.9*   PLATELETCT 429 384       Imaging  No new imaging today.    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  Suggest hypoglycemia due to possible sulfonylurea overdose.  -Seizure Precautions  -continue Keppra    GIB (gastrointestinal bleeding)- (present on admission)  Assessment & Plan  -EGD 1/2/2024 showed H. pylori, patient was sent home on PPI, bismuth, tetracycline, and metronidazole for 14 days  -suspected, mild  -Hgb stable  -gastric occult (+)  -PPI  -consider GI consult if worsens    On mechanically assisted ventilation (HCC)- (present on admission)  Assessment & Plan  -Intubated for low GCS, not protecting airway, and hypoxia  -Intubation date 1/12  -Ventilator dependent respiratory failure  -Modify ventilator to optimize oxygenation, acid-base balance and ventilation  -CXR as indicated: monitor lung volumes and tube/line placement  -HOB > 30  -Titrate FiO2 to keep sats greater than 92%  -Chlorhexidine  -goal CO2 35-40  -Daily awakening and SBT trials unless contraindicated  -ABCDEF bundle  -I am actively adjusting ventilator based on clinical indicators and ABG's      Acute encephalopathy- (present on admission)  Assessment & Plan  -Metabolic-->Concerning for hypoglycemic event at home vs anoxic brain injury vs seizures  -Keep patient awake during the day and avoid daytime naps. Remove all unnecessary lines (central lines, peripheral IVs, feeding tubes, yadav catheters). Avoid polypharmacy, frequent re-orientation, maximize family time at bedside, use glasses and hearing aids if needed, treat pain, encourage ambulation, minimize benzos/anticholinergic agents.   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 German    S/P CABG x 2- (present on admission)  Assessment & Plan  -History of 2015  -Continue statin and aspirin    Essential hypertension- (present on admission)  Assessment & Plan  -SBP goals < 160  -PRN's available    Multiple vessel coronary artery disease- (present on admission)  Assessment & Plan  -S/p Stent placement followed by CABG 2015    Type 2 diabetes mellitus without complication, without long-term current use of insulin (HCC)- (present on admission)  Assessment & Plan  -Hypoglycemia episode prompting EMS activation and admission to outside facility  -Possible sulfonylurea overdose -octreotide every 6 hours per neurology recommendations x 24 hours with no significant improvement. Stopped 1/16  -Hypoglycemia protocol  -Dietary consult for tube feeding recommendations  -try and avoid BG > 180 given risk for glucose reperfusion injury  -Insulin drip 140-180 protocol         VTE:  Lovenox  Ulcer: H2 Antagonist  Lines: Mcbride Catheter  Ongoing indication addressed and PICC    I have performed a physical exam and reviewed and updated ROS and Plan today (1/21/2024). In review of yesterday's note (1/20/2024), there are no changes except as documented above.     Discussed patient condition and risk of morbidity and/or mortality with RN, RT, Pharmacy, , neurology, and my attending Dr. Suarez  The patient remains critically ill.  Critical care time = 44 minutes in directly providing and coordinating critical care and extensive data review.  No time overlap and excludes procedures.    Please note that this dictation was created using voice recognition software. I have made every reasonable attempt to correct obvious errors, but there may be errors of grammar and " possibly content that I did not discover before finalizing the note.    YOLANDA Vargas.

## 2024-01-21 NOTE — PROGRESS NOTES
Referring Physician: Juan Jose Suarez M.D.    S:  Family meeting yesterday. Was opening eyes to noxious stimuli ; at time seemingly spontaneous yesterday. Not opening eyes this morning during examination. Withdrawals. No recognized seizures.     O:    Vitals:    01/21/24 0700   BP: 121/73   Pulse: 87   Resp: (!) 28   Temp:    SpO2: 98%     No sedation. Intubated and Ventilated.    Level of consciousness: Withdrawals in the bilateral lower extremities. Does not open eyes during examination.    Pupils: Reactive to bright light (OU)  Oculomotor: Conjugate. No gaze deviation. No nystagmus.  Blink to threat: Absent    Face appears symmetric    Motor: No spontaneous movements. No abnormal postures or movements.  Deep tendon reflexes: Not tested.   Plantar responses: Triple flexion    No interval neuroimaging.    cEEG: No epileptiform features on brief bedside review.     Impression & Recommendations: Comatose after severe, prolonged hypoglycemia.  Withdrawals in the bilateral lower extremities. Not opening eyes this morning during examination or spontaneously.   -Follow-up EEG report. May discontinue if no seizures reported.  -Repeat MRI brain is pending.   -Continue to avoid sedation.  -Continue outstanding supportive and medical care.    -Palliative care is involved.      Neurology will follow along.     I provided a total of 45 minutes of acute neurologic care for this patient, review medical records, diagnostic studies, direct face-to-face time with the patient, documentation and communicating plan of care.      Yogesh Carmen MD  Neurohospitalist, Acute Care Services          Please note that this dictation was created using voice recognition software.  I have made every reasonable attempt to correct obvious errors, but I expect that there are errors of grammar and possibly content that I did not discover before finalizing the note.

## 2024-01-22 NOTE — DIETARY
Nutrition support weekly update:  Day 8 of admit.  Pradip Baez is a 73 y.o. male with admitting DX of Seizure sand hypoglycemia.      Tube feeding initiated on 1/15. Current TF via NG tube is Glucerna 1.2 with goal rate of 65 ml/hr to provide 1872 kcals, 94 gm protein, and 1256 ml free water per day. Current free water order is 30 ml Q 4 hours.     Assessment:  Weight 1/19: 70.4 kg via bed scale. Weight is variable between 70.4 kg - 75.1 kg, 77.1 kg weight from today has no weight scale attached. Pt is +2.3L fluids per I/O, which has been increasing.     Calculation/Equation: REE per MSJ x 1.2 = 1679 kcals  RMR per PSU (VE: 7.0 L/min and Tmax: 38.2 C) = 1788 kcals  Calories/day: 1650 - 1850 (23 - 26 kcals/kg)  Grams Protein/day: 84 - 106 (1.2 - 1.5 gm/kg)    Evaluation:   Pt is receiving TF at goal rate and tolerating per RN.   Pt is intubated on ventilator support day #10.   Noted plan for comfort care 14 days from 1/26.   Skin: DTI to L sacrum, 2nd anterior L toe wound. Dependent edema noted in flowsheets, no specifics noted.   Labs: Glucose 129, BUN 25  Meds: lipitor, prevacid, keppra, anti-emetics prn, insulin IV drip at 5.5 units/hr  Last BM: 1/22  CHO-controlled formula remains appropriate to meet estimated needs.     Malnutrition risk: No new risk identified.     Recommendations/Plan:  Continue TF Glucerna 1.2 with goal rate of 65 ml/hr.   Fluids per MD.    Diet upgrades per SLP/MD.     RD following.

## 2024-01-22 NOTE — PROGRESS NOTES
Referring Physician: Juan Jose Suarez M.D.    S:  Randomly open eyes. Does not follow commands. No recognized purposeful movements.     O:    Vitals:    01/22/24 0700   BP: 123/71   Pulse: 84   Resp: (!) 21   Temp:    SpO2:      No sedation. Intubated and Ventilated.    Level of consciousness: Withdrawals in the bilateral lower extremities. Does not open eyes during examination.    Pupils: Reactive to bright light (OU)  Oculomotor: Conjugate. No gaze deviation. No nystagmus.  Blink to threat: Absent    Face appears symmetric    Motor: No spontaneous movements. No abnormal postures or movements.  Deep tendon reflexes: Not tested.   Plantar responses: Triple flexion    MRI brain: Subtle DWI changes most prominently left posteriorly less conspicuous when compared to prior. Small left occipital subdural hematoma.     cEEG INTERPRETATION:  Abnormal continuous video EEG recording in the awake and drowsy/sleep state(s):  -Moderate background slowing suggestive of diffuse/multifocal cerebral dysfunction and consistent with a non-specific encephalopathy. Clinical correlation recommended.  -No persistent focal or regional slowing and no background asymmetries were seen.   -No definitive epileptiform discharges were seen.  -No definitive seizures.   -Clinical Events: None reported or seen on intermittent video review    Impression & Recommendations: Comatose after severe, prolonged hypoglycemia.  Overall no significant neurologic improvement.  Withdrawals in the bilateral lower extremities. Not opening eyes again this morning during examination or spontaneously. There were no seizure on EEG. The background was diffusely slow. State changes and sleep architecture were noted. Interval MRI brain with small left occipital sub-dural hematoma. Subtle DWI changes posteriorly as described on my review.  -Continue to avoid sedation.  -Continue outstanding supportive and medical care.    -Palliative care is involved.      Neurology  will follow along.     I provided a total of 50 minutes of acute neurologic care for this patient, review medical records, diagnostic studies, direct face-to-face time with the patient, documentation and communicating plan of care.      Ygoesh Carmen MD  Neurohospitalist, Acute Care Services          Please note that this dictation was created using voice recognition software.  I have made every reasonable attempt to correct obvious errors, but I expect that there are errors of grammar and possibly content that I did not discover before finalizing the note.

## 2024-01-22 NOTE — PROGRESS NOTES
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 Pylori who transferred here with seizures requiring cEEG. Per wife, patient began exhibiting AMS on 1/11. On 1/12 patient was not improving and wife called EMS. Upon arrival of EMS, patients glucose was 22 and he was given 25 gm Dextrose with no improvement in mentation. He was brought to [facility] ED and was emergently intubated for hypoxia and airway protection. 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 Neurology was consulted and recommended 24 hour EEG which is not available at [facility].     Per epileptologist interpreting the EEG 1/16: \"Findings suggest a predisposition for seizures to arise from the left hemisphere, with no seizures observed. There is a resolution of this finding, which in the context of anti-seizure medication treatment, suggests their origin was epileptiform.      In addition, there is focal left hemispheric dysfunction relative to the right, a finding which may be seen in the setting of a structural abnormality, postictally, or in other instances that would confer left hemispheric dysfunction. Clinical correlation recommended. There is also evidence of mild to moderate diffuse cerebral dysfunction of a nonspecific etiology, with superimposed sedative/medication effects\".     1/16 - stopped cEEG. Vent day  #4. No purposeful movements, triple flexion response BLE only. (+) cough/gag/corneals. DNR, I OK.  1/17 -vent day #5.  No significant neurological changes  1/18 - Vent day #6. No significant neurological changes  1/19 - Vent day #7. Palliative Care consult. " Plan for comfort care Monday 1/20 - Vent day #8. Family now wanting to wait 14 days (1/26) from injury to transition to comfort care.   1/21 - Vent day #9. Repeat cEEG negative for seizures.     Interval Problem Update  Reviewed last 24 hour events:  No overnight events  No sedation. RASS -4  (+) cough/gag/corneal  Neuro: Eyes open spontaneously. Does not track, does not follow.  Spontaneous nonpurposeful movement LUE.  Triple flexion RLE  SR/ST   -140's. No drips  Vent day #10: 14/420/8/30%.  Insulin 5.5   TF @ goal. BMS < 100 out overnight  Yadav 800 out overnight  I/O: 2200/1900  PIV, PICC, yadav  Mobility 1    Review of Systems  Review of Systems   Unable to perform ROS: Intubated        Vital Signs for last 24 hours   Pulse:  [] 82  Resp:  [3-91] 29  BP: (115-146)/(65-88) 124/74  SpO2:  [96 %-100 %] 98 %    Hemodynamic parameters for last 24 hours       Respiratory Information for the last 24 hours  Vent Mode: APVCMV  Rate (breaths/min): 14  Vt Target (mL): 420  PEEP/CPAP: 8  P Support: 5  MAP: 11  Control VTE (exp VT): 471    Physical Exam   Physical Exam  Vitals and nursing note reviewed.   Constitutional:       General: He is not in acute distress.     Appearance: Normal appearance. He is ill-appearing.      Interventions: He is intubated.   HENT:      Head: Normocephalic.      Nose: Nose normal.      Mouth/Throat:      Lips: Pink.      Mouth: Mucous membranes are moist.   Eyes:      Pupils: Pupils are equal, round, and reactive to light.   Cardiovascular:      Rate and Rhythm: Normal rate and regular rhythm.      Pulses: Normal pulses.      Heart sounds: Normal heart sounds.   Pulmonary:      Effort: Pulmonary effort is normal. He is intubated.      Breath sounds: No wheezing or rhonchi.   Abdominal:      General: Bowel sounds are normal.      Palpations: Abdomen is soft.   Musculoskeletal:      Right lower leg: No edema.      Left lower leg: No edema.   Skin:     General: Skin is warm and dry.    Neurological:      GCS: GCS eye subscore is 1. GCS verbal subscore is 1. GCS motor subscore is 1.      Comments: 3T  Triple flexion BLE  No spontaneous movements  BUE flaccid   Psychiatric:      Comments: Intubated         Medications  Current Facility-Administered Medications   Medication Dose Route Frequency Provider Last Rate Last Admin    lansoprazole (Prevacid) solutab 30 mg  30 mg Enteral Tube BID Tammy Morton, A.P.R.N.   30 mg at 01/22/24 0508    insulin regular (Humulin R) 100 Units in  mL Infusion  0-29 Units/hr Intravenous Continuous Tammy Morton, A.P.R.N. 7 mL/hr at 01/22/24 0519 7 Units/hr at 01/22/24 0519    dextrose 50% (D50W) injection 12.5-25 g  12.5-25 g Intravenous PRN Tammy Morton, A.P.R.N.        levETIRAcetam (Keppra) tablet 1,000 mg  1,000 mg Enteral Tube BID Juan Jose Suarez M.D.   1,000 mg at 01/22/24 0505    scopolamine (Transderm-Scop) patch 1 Patch  1 Patch Transdermal Q72HRS Tammy Morton, A.P.R.N.   1 Patch at 01/20/24 1405    labetalol (Normodyne/Trandate) injection 10-20 mg  10-20 mg Intravenous Q4HRS PRN Tammy Morton, A.P.R.N.   10 mg at 01/16/24 1612    hydrALAZINE (Apresoline) injection 10-20 mg  10-20 mg Intravenous Q4HRS PRN Tammy Morton, A.P.R.N.        Pharmacy Consult: Enteral tube insertion - review meds/change route/product selection  1 Each Other PHARMACY TO DOSE Tammy Morton, A.P.R.N.        Respiratory Therapy Consult   Nebulization Continuous RT Victorina Christianson M.D.        [Held by provider] senna-docusate (Pericolace Or Senokot S) 8.6-50 MG per tablet 2 Tablet  2 Tablet Enteral Tube BID Victorina Christinason M.D.   2 Tablet at 01/16/24 1800    And    [Held by provider] polyethylene glycol/lytes (Miralax) Packet 1 Packet  1 Packet Enteral Tube QDAY PRN Victorina Christianson M.D.        And    [Held by provider] magnesium hydroxide (Milk Of Magnesia) suspension 30 mL  30 mL Enteral Tube QDAY PRN Victorina Christianson M.D.        And    [Held by provider]  bisacodyl (Dulcolax) suppository 10 mg  10 mg Rectal QDAY PRN Victorina Christianson M.D. MD Alert...ICU Electrolyte Replacement per Pharmacy   Other PHARMACY TO DOSE Victorina Christianson M.D.        lidocaine (Xylocaine) 1 % injection 2 mL  2 mL Tracheal Tube Q30 MIN PRN Victorina Christianson M.D.        ipratropium-albuterol (DUONEB) nebulizer solution  3 mL Nebulization Q2HRS PRN (RT) Victorina Christianson M.D.        metoprolol tartrate (Lopressor) tablet 25 mg  25 mg Enteral Tube BID Victorina Christianson M.D.   25 mg at 01/22/24 0505    enoxaparin (Lovenox) inj 40 mg  40 mg Subcutaneous DAILY AT 1800 Victorina Christianson M.D.   40 mg at 01/21/24 1737    ondansetron (Zofran) syringe/vial injection 4 mg  4 mg Intravenous Q4HRS PRN Victorina Christianson M.D.        acetaminophen (Tylenol) tablet 650 mg  650 mg Enteral Tube Q6HRS PRN Victorina Christianson M.D.   650 mg at 01/21/24 1820    ondansetron (Zofran ODT) dispertab 4 mg  4 mg Enteral Tube Q4HRS PRN Victorina Christianson M.D.        atorvastatin (Lipitor) tablet 80 mg  80 mg Enteral Tube Q EVENING Victorina Christianson M.D.   80 mg at 01/21/24 1737    aspirin (Asa) chewable tab 81 mg  81 mg Enteral Tube DAILY Victorina Christianson M.D.   81 mg at 01/22/24 0505       Fluids    Intake/Output Summary (Last 24 hours) at 1/22/2024 0705  Last data filed at 1/22/2024 0627  Gross per 24 hour   Intake 2190.98 ml   Output 1935 ml   Net 255.98 ml       Laboratory          Recent Labs     01/20/24  0340   SODIUM 136   POTASSIUM 4.2   CHLORIDE 99   CO2 27   BUN 25*   CREATININE 1.05   CALCIUM 8.9     Recent Labs     01/20/24  0340   GLUCOSE 129*     Recent Labs     01/20/24  0340   WBC 13.8*   NEUTSPOLYS 71.80   LYMPHOCYTES 10.80*   MONOCYTES 11.40   EOSINOPHILS 0.90   BASOPHILS 0.70     Recent Labs     01/20/24  0340   RBC 3.51*   HEMOGLOBIN 10.6*   HEMATOCRIT 31.9*   PLATELETCT 384       Imaging  MRI:   Reviewed    Assessment/Plan  * Seizure-like activity (HCC)- (present on admission)  Assessment &  "Plan  -Patient transferred here for cEEG services not available at outside facility  -Brain MRI at outside facility showing 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  Suggest hypoglycemia due to possible sulfonylurea overdose.  -Seizure Precautions  -continue Keppra    GIB (gastrointestinal bleeding)- (present on admission)  Assessment & Plan  -EGD 1/2/2024 showed H. pylori, patient was sent home on PPI, bismuth, tetracycline, and metronidazole for 14 days  -suspected, mild  -Hgb stable  -gastric occult (+)  -PPI  -consider GI consult if worsens    On mechanically assisted ventilation (HCC)- (present on admission)  Assessment & Plan  -Intubated for low GCS, not protecting airway, and hypoxia  -Intubation date 1/12  -Ventilator dependent respiratory failure  -Modify ventilator to optimize oxygenation, acid-base balance and ventilation  -CXR as indicated: monitor lung volumes and tube/line placement  -HOB > 30  -Titrate FiO2 to keep sats greater than 92%  -Chlorhexidine  -goal CO2 35-40  -Daily awakening and SBT trials unless contraindicated  -ABCDEF bundle  -I am actively adjusting ventilator based on clinical indicators and ABG's      Acute encephalopathy- (present on admission)  Assessment & Plan  -Metabolic-->Concerning for hypoglycemic event at home vs anoxic brain injury vs seizures  -Keep patient awake during the day and avoid daytime naps. Remove all unnecessary lines (central lines, peripheral IVs, feeding tubes, yadav catheters). Avoid polypharmacy, frequent re-orientation, maximize family time at bedside, use glasses and hearing aids if needed, treat pain, encourage ambulation, minimize benzos/anticholinergic agents.   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 German    S/P CABG x 2- (present on admission)  Assessment & Plan  -History of 2015  -Continue statin and aspirin    Essential hypertension- (present on admission)  Assessment & Plan  -SBP goals < 160  -PRN's available    Multiple vessel coronary artery disease- (present on admission)  Assessment & Plan  -S/p Stent placement followed by CABG 2015    Type 2 diabetes mellitus without complication, without long-term current use of insulin (HCC)- (present on admission)  Assessment & Plan  -Hypoglycemia episode prompting EMS activation and admission to outside facility  -Possible sulfonylurea overdose -octreotide every 6 hours per neurology recommendations x 24 hours with no significant improvement. Stopped 1/16  -Hypoglycemia protocol  -Dietary consult for tube feeding recommendations  -try and avoid BG > 180 given risk for glucose reperfusion injury  -Insulin drip 140-180 protocol         VTE:  Lovenox  Ulcer: PPI  Lines: Mcbride Catheter  Ongoing indication addressed and PICC    I have performed a physical exam and reviewed 42and updated ROS and Plan today (1/22/2024). In review of yesterday's note (1/21/2024), there are no changes except as documented above.     Discussed patient condition and risk of morbidity and/or mortality with Family, RN, RT, Pharmacy, , neurology, and my attending Dr. Viera  The patient remains critically ill.  Critical care time = 40 minutes in directly providing and coordinating critical care and extensive data review.  No time overlap and excludes procedures.    Please note that this dictation was created using voice recognition software. I have made every reasonable attempt to correct obvious errors, but there may be errors of grammar and possibly content that I did not discover before finalizing the note.    YOLANDA Vargas.

## 2024-01-22 NOTE — CARE PLAN
The patient is Stable - Low risk of patient condition declining or worsening    Shift Goals  Clinical Goals: Neuro assessments as ordered and as needed, safety, turns  Patient Goals: ANDREW  Family Goals: ANDREW    Progress made toward(s) clinical / shift goals: Neuro assessments as ordered and as needed, safety, turns      Problem: Fall Risk  Goal: Patient will remain free from falls  Outcome: Progressing  Note: Fall risk assessed; appropriate intervention in place.           Patient is not progressing towards the following goals:    Problem: Knowledge Deficit - Standard  Goal: Patient and family/care givers will demonstrate understanding of plan of care, disease process/condition, diagnostic tests and medications  Outcome: Not Progressing  Note: Knowledge assessment with family members is variable. Education provided.

## 2024-01-22 NOTE — CARE PLAN
Problem: Ventilation  Goal: Ability to achieve and maintain unassisted ventilation or tolerate decreased levels of ventilator support  Description: Target End Date:  4 days     Document on Vent flowsheet    1.  Support and monitor invasive and noninvasive mechanical ventilation  2.  Monitor ventilator weaning response  3.  Perform ventilator associated pneumonia prevention interventions  4.  Manage ventilation therapy by monitoring diagnostic test results  Outcome: Not Met  Ventilator Daily Summary    Vent Day # 9    Airway: 7.5@24    Ventilator settings: 14 420 8 30%    Weaning trials: spont in am failed for RSBI      Plan: Continue current ventilator settings and wean mechanical ventilation as tolerated per physician orders.

## 2024-01-22 NOTE — DISCHARGE PLANNING
Case Management Discharge Planning    Chart reviewed and case discussed in ICU rounds:  Patient with transfer back agreement with [facility] however, wife not agreeable to transfer back.   +Vent (day 10)/insulin drip/Q4H neurochecks/NGT (tolerating feeds).  Neurology following.      Family wanting to wait 14 days (1/26/2024) from injury to transition to comfort care.

## 2024-01-22 NOTE — CARE PLAN
The patient is Watcher - Medium risk of patient condition declining or worsening    Shift Goals  Clinical Goals: stable/improved neuro exam, MRI  Patient Goals: ANDREW  Family Goals: ANDREW    Progress made toward(s) clinical / shift goals:    Problem: Knowledge Deficit - Standard  Goal: Patient and family/care givers will demonstrate understanding of plan of care, disease process/condition, diagnostic tests and medications  Outcome: Progressing     Problem: Skin Integrity  Goal: Skin integrity is maintained or improved  Outcome: Progressing     Problem: Fall Risk  Goal: Patient will remain free from falls  Outcome: Progressing     Problem: Hemodynamics  Goal: Patient's hemodynamics, fluid balance and neurologic status will be stable or improve  Outcome: Progressing     Problem: Respiratory  Goal: Patient will achieve/maintain optimum respiratory ventilation and gas exchange  Outcome: Progressing     Problem: Mechanical Ventilation  Goal: Safe management of artificial airway and ventilation  Outcome: Progressing     Problem: Urinary - Renal Perfusion  Goal: Ability to achieve and maintain adequate renal perfusion and functioning will improve  Outcome: Progressing     Problem: Venous Thromboembolism (VTE) Prevention  Goal: The patient will remain free from venous thromboembolism (VTE)  Outcome: Progressing     Problem: Nutrition  Goal: Enteral nutrition will be maintained or improve  Outcome: Progressing  Goal: Enteral nutrition will be maintained or improve  Outcome: Progressing     Problem: Urinary Elimination  Goal: Establish and maintain regular urinary output  Outcome: Progressing     Problem: Pain - Standard  Goal: Alleviation of pain or a reduction in pain to the patient’s comfort goal  Outcome: Progressing       Patient is not progressing towards the following goals:      Problem: Psychosocial  Goal: Patient's ability to verbalize feelings about condition will improve  Outcome: Not Progressing  Goal: Patient and  family will demonstrate ability to cope with life altering diagnosis and/or procedure  Outcome: Not Progressing

## 2024-01-22 NOTE — CARE PLAN
The patient is Watcher - Medium risk of patient condition declining or worsening    Shift Goals  Clinical Goals: Stable or Improved Neuro exam, Manage electrolytes, Manage Insulin Gtgt  Patient Goals: ANDREW  Family Goals: Family Updates, No family at bedside    Progress made toward(s) clinical / shift goals:    Problem: Knowledge Deficit - Standard  Goal: Patient and family/care givers will demonstrate understanding of plan of care, disease process/condition, diagnostic tests and medications  Outcome: Progressing     Problem: Skin Integrity  Goal: Skin integrity is maintained or improved  Outcome: Progressing     Problem: Fall Risk  Goal: Patient will remain free from falls  Outcome: Progressing     Problem: Psychosocial  Goal: Patient's level of anxiety will decrease  Outcome: Progressing  Goal: Patient and family will demonstrate ability to cope with life altering diagnosis and/or procedure  Outcome: Progressing     Problem: Hemodynamics  Goal: Patient's hemodynamics, fluid balance and neurologic status will be stable or improve  Outcome: Progressing     Problem: Respiratory  Goal: Patient will achieve/maintain optimum respiratory ventilation and gas exchange  Outcome: Progressing     Problem: Mechanical Ventilation  Goal: Safe management of artificial airway and ventilation  Outcome: Progressing     Problem: Risk for Aspiration  Goal: Patient's risk for aspiration will be absent or decrease  Outcome: Progressing     Problem: Urinary - Renal Perfusion  Goal: Ability to achieve and maintain adequate renal perfusion and functioning will improve  Outcome: Progressing     Problem: Venous Thromboembolism (VTE) Prevention  Goal: The patient will remain free from venous thromboembolism (VTE)  Outcome: Progressing     Problem: Nutrition  Goal: Enteral nutrition will be maintained or improve  Outcome: Progressing     Problem: Urinary Elimination  Goal: Establish and maintain regular urinary output  Outcome: Progressing      Problem: Bowel Elimination  Goal: Establish and maintain regular bowel function  Outcome: Progressing     Problem: Pain - Standard  Goal: Alleviation of pain or a reduction in pain to the patient’s comfort goal  Outcome: Progressing       Patient is not progressing towards the following goals:      Problem: Psychosocial  Goal: Patient's ability to verbalize feelings about condition will improve  Outcome: Not Progressing   Patient continues encephalopathic and vented; unable to communicate effectively  Problem: Mechanical Ventilation  Goal: Successful weaning off mechanical ventilator, spontaneously maintains adequate gas exchange  Outcome: Not Progressing    Patient continues to require mechanical ventilation.

## 2024-01-23 PROBLEM — D72.829 FEVER WITH LEUKOCYTOSIS AND LEUKOCYTE COUNT GREATER THAN OR EQUAL TO 20,000: Status: ACTIVE | Noted: 2024-01-01

## 2024-01-23 PROBLEM — K92.2 GIB (GASTROINTESTINAL BLEEDING): Status: RESOLVED | Noted: 2024-01-01 | Resolved: 2024-01-01

## 2024-01-23 NOTE — PROGRESS NOTES
Referring Physician: Juan Jose Suarez M.D.    S:  Discussed briefly with RN. No neurologic changes. No bedside indicators of neurologic improvement.     O:    Vitals:    01/23/24 0700   BP: 124/76   Pulse: 88   Resp: (!) 21   Temp:    SpO2: 98%     No sedation. Intubated and Ventilated.    Level of consciousness: Withdrawals in the bilateral lower extremities. Opens eyes during noxious stimuli during testing of bilateral plantar responses. Does not follow.     Pupils: Reactive to bright light (OU)  Oculomotor: Conjugate. No gaze deviation. No nystagmus.  Blink to threat: Absent    Face appears symmetric    Motor: No spontaneous movements. No abnormal postures or movements.  Deep tendon reflexes: Not tested.   Plantar responses: Triple flexion    MRI brain: Subtle DWI changes most prominently left posteriorly less conspicuous when compared to prior. Small left occipital subdural hematoma.     cEEG INTERPRETATION:  Abnormal continuous video EEG recording in the awake and drowsy/sleep state(s):  -Moderate background slowing suggestive of diffuse/multifocal cerebral dysfunction and consistent with a non-specific encephalopathy. Clinical correlation recommended.  -No persistent focal or regional slowing and no background asymmetries were seen.   -No definitive epileptiform discharges were seen.  -No definitive seizures.   -Clinical Events: None reported or seen on intermittent video review    Impression & Recommendations: Comatose after severe, prolonged hypoglycemia.  Overall no significant neurologic improvement.  Withdrawals in the bilateral lower extremities. Opens eyes briefly during testing of the bilateral plantar responses. Does not follow commands. There were no seizure on EEG. The background was diffusely slow. State changes and sleep architecture were noted. Interval MRI brain with small left occipital sub-dural hematoma. Subtle DWI changes posteriorly as described on my review.   -Continue to avoid  sedation.  -Continue outstanding supportive and medical care.    -Palliative care is involved.      Neurology will follow along.     I provided a total of 35 minutes of acute neurologic care for this patient, review medical records, diagnostic studies, direct face-to-face time with the patient, documentation and communicating plan of care.      Yogesh Carmen MD  Neurohospitalist, Acute Care Services          Please note that this dictation was created using voice recognition software.  I have made every reasonable attempt to correct obvious errors, but I expect that there are errors of grammar and possibly content that I did not discover before finalizing the note.

## 2024-01-23 NOTE — PROGRESS NOTES
"Critical Care Progress Note    Date of admission  1/14/2024    Chief Complaint  Altered mental status.  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 Pylori who transferred here with seizures requiring cEEG. Per wife, patient began exhibiting AMS on 1/11. On 1/12 patient was not improving and wife called EMS. Upon arrival of EMS, patients glucose was 22 and he was given 25 gm Dextrose with no improvement in mentation. He was brought to [facility] ED and was emergently intubated for hypoxia and airway protection. 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 Neurology was consulted and recommended 24 hour EEG which is not available at [facility].     Per epileptologist interpreting the EEG 1/16: \"Findings suggest a predisposition for seizures to arise from the left hemisphere, with no seizures observed. There is a resolution of this finding, which in the context of anti-seizure medication treatment, suggests their origin was epileptiform.      In addition, there is focal left hemispheric dysfunction relative to the right, a finding which may be seen in the setting of a structural abnormality, postictally, or in other instances that would confer left hemispheric dysfunction. Clinical correlation recommended. There is also evidence of mild to moderate diffuse cerebral dysfunction of a nonspecific etiology, with superimposed sedative/medication effects\".     1/16 - stopped cEEG. Vent day  #4. No purposeful movements, triple flexion response BLE only. (+) cough/gag/corneals. DNR, I OK.  1/17 -vent day #5.  No significant neurological changes  1/18 - Vent day #6. No significant neurological changes  1/19 - Vent day #7. " Palliative Care consult. Plan for comfort care Monday 1/20 - Vent day #8. Family now wanting to wait 14 days (1/26) from injury to transition to comfort care.   1/21 - Vent day #9. Repeat cEEG negative for seizures.   1/22 - Vent day #10. Repeat brain MRI without significant changes.    Interval Problem Update  Reviewed last 24 hour events:  No overnight events.  Tmax 100.8  SR 80s to 90s  SBP 110s to 130s.  No drips  Chemsticks 136-188.  Insulin drip actively titrating.  Currently 6.5 to 8 units/h  Tube feedings at goal  Neuro: occasionally opens eyes. RUE 0/5, BLE wiggle toes occasionally. LUE 1-2/5 with spontaneous non-purposeful movements.   Vent day #11: CMV 14/420/8/30%. Minimal secretions.   RASS -1.  No sedation.  No pain meds   out overnight  Yadav 825 out overnight  PICC, PIV, yadav  PPI, Lovenox, no ABX  Mobility 1 - not eligible to advance due to mentation    20 + minutes spent at bedside today with Dr. Carmen from Neurology and patient's wife. Also called and updated patient's daughter, Chary, over the phone. Current plan to extubate to comfort care measures on Thursday. I've asked the family to commit to a time (previously have made plans/times for meetings but family has not shown). Updated Palliative care as well.     Review of Systems  Review of Systems   Unable to perform ROS: Intubated        Vital Signs for last 24 hours   Pulse:  [] 97  Resp:  [16-32] 18  BP: (108-157)/(67-85) 126/68  SpO2:  [96 %-99 %] 97 %    Hemodynamic parameters for last 24 hours       Respiratory Information for the last 24 hours  Vent Mode: APVCMV  Rate (breaths/min): 14  Vt Target (mL): 420  PEEP/CPAP: 8  P Support: 5  MAP: 11  Control VTE (exp VT): 459    Physical Exam   Physical Exam  Vitals and nursing note reviewed.   Constitutional:       General: He is not in acute distress.     Appearance: Normal appearance. He is ill-appearing.      Interventions: He is intubated.   HENT:      Head: Normocephalic.       Nose: Nose normal.      Mouth/Throat:      Lips: Pink.      Mouth: Mucous membranes are moist.   Eyes:      Pupils: Pupils are equal, round, and reactive to light.   Cardiovascular:      Rate and Rhythm: Normal rate and regular rhythm.      Pulses: Normal pulses.      Heart sounds: Normal heart sounds.   Pulmonary:      Effort: Pulmonary effort is normal. He is intubated.      Breath sounds: No wheezing or rhonchi.   Abdominal:      General: Bowel sounds are normal.      Palpations: Abdomen is soft.   Musculoskeletal:      Right lower leg: No edema.      Left lower leg: No edema.   Skin:     General: Skin is warm and dry.   Neurological:      GCS: GCS eye subscore is 4. GCS verbal subscore is 1. GCS motor subscore is 1.      Comments: 6T - spontaneous eye opening. Does not track.  Spontaneous, non-purposeful movements LUE.  Triple-flexion RLE  No movements RUE   Psychiatric:      Comments: Intubated         Medications  Current Facility-Administered Medications   Medication Dose Route Frequency Provider Last Rate Last Admin    lansoprazole (Prevacid) solutab 30 mg  30 mg Enteral Tube BID Tammy Morton A.P.R.N.   30 mg at 01/23/24 0509    insulin regular (Humulin R) 100 Units in  mL Infusion  0-29 Units/hr Intravenous Continuous Tammy Morton, A.P.R.N. 5 mL/hr at 01/23/24 1100 5 Units/hr at 01/23/24 1100    dextrose 50% (D50W) injection 12.5-25 g  12.5-25 g Intravenous PRN Tammy Morton, A.P.R.N.        levETIRAcetam (Keppra) tablet 1,000 mg  1,000 mg Enteral Tube BID Juan Jose Suarez M.D.   1,000 mg at 01/23/24 0509    scopolamine (Transderm-Scop) patch 1 Patch  1 Patch Transdermal Q72HRS Tammy Morton A.P.R.N.   1 Patch at 01/20/24 1405    labetalol (Normodyne/Trandate) injection 10-20 mg  10-20 mg Intravenous Q4HRS PRN Tammy Morton A.P.R.N.   10 mg at 01/16/24 1612    hydrALAZINE (Apresoline) injection 10-20 mg  10-20 mg Intravenous Q4HRS PRN JANE Vargas        Pharmacy Consult:  Enteral tube insertion - review meds/change route/product selection  1 Each Other PHARMACY TO DOSE JANE Vargas        Respiratory Therapy Consult   Nebulization Continuous RT Victorina Christianson M.D.        [Held by provider] senna-docusate (Pericolace Or Senokot S) 8.6-50 MG per tablet 2 Tablet  2 Tablet Enteral Tube BID Victorina Christianson M.D.   2 Tablet at 01/16/24 1800    And    [Held by provider] polyethylene glycol/lytes (Miralax) Packet 1 Packet  1 Packet Enteral Tube QDAY PRN Victorina Christianson M.D.        And    [Held by provider] magnesium hydroxide (Milk Of Magnesia) suspension 30 mL  30 mL Enteral Tube QDAY PRN Victorina Christianson M.D.        And    [Held by provider] bisacodyl (Dulcolax) suppository 10 mg  10 mg Rectal QDAY PRN Victorina Christianson M.D. MD Alert...ICU Electrolyte Replacement per Pharmacy   Other PHARMACY TO DOSE Victorina Christianson M.D.        lidocaine (Xylocaine) 1 % injection 2 mL  2 mL Tracheal Tube Q30 MIN PRN Victorina Christianson M.D.        ipratropium-albuterol (DUONEB) nebulizer solution  3 mL Nebulization Q2HRS PRN (RT) Victorina Christianson M.D.        metoprolol tartrate (Lopressor) tablet 25 mg  25 mg Enteral Tube BID Victorina Christianson M.D.   25 mg at 01/23/24 0509    enoxaparin (Lovenox) inj 40 mg  40 mg Subcutaneous DAILY AT 1800 Victorina Christianson M.D.   40 mg at 01/22/24 1712    ondansetron (Zofran) syringe/vial injection 4 mg  4 mg Intravenous Q4HRS PRN Victorina Christianson M.D.        acetaminophen (Tylenol) tablet 650 mg  650 mg Enteral Tube Q6HRS PRN Victorina Christianson M.D.   650 mg at 01/23/24 0200    ondansetron (Zofran ODT) dispertab 4 mg  4 mg Enteral Tube Q4HRS PRN Victorina Christianson M.D.        atorvastatin (Lipitor) tablet 80 mg  80 mg Enteral Tube Q EVENING Victorina Christianson M.D.   80 mg at 01/22/24 1712    aspirin (Asa) chewable tab 81 mg  81 mg Enteral Tube DAILY Victorina Christianson M.D.   81 mg at 01/23/24 6236       Fluids    Intake/Output Summary (Last 24  hours) at 1/23/2024 1220  Last data filed at 1/23/2024 1000  Gross per 24 hour   Intake 1833.09 ml   Output 1415 ml   Net 418.09 ml       Laboratory          Recent Labs     01/23/24  0802   SODIUM 136   POTASSIUM 4.9   CHLORIDE 100   CO2 29   BUN 30*   CREATININE 0.99   MAGNESIUM 2.3   PHOSPHORUS 4.6*   CALCIUM 8.9     Recent Labs     01/23/24  0802   ALTSGPT 42   ASTSGOT 53*   ALKPHOSPHAT 85   TBILIRUBIN 0.2   GLUCOSE 159*     Recent Labs     01/23/24  0802   WBC 15.3*   NEUTSPOLYS 73.60*   LYMPHOCYTES 9.70*   MONOCYTES 12.00   EOSINOPHILS 1.00   BASOPHILS 0.70   ASTSGOT 53*   ALTSGPT 42   ALKPHOSPHAT 85   TBILIRUBIN 0.2     Recent Labs     01/23/24  0802   RBC 3.33*   HEMOGLOBIN 10.2*   HEMATOCRIT 30.3*   PLATELETCT 319       Imaging  No new imaging today.    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  Suggest hypoglycemia due to possible sulfonylurea overdose.  -Seizure Precautions  -continue Keppra  -repeat cEEG 1/21 negative for seizures    Leukocytosis- (present on admission)  Assessment & Plan  -with fever  -check blood cultures, UA, procalcitonin, BLE US  -holding off on empiric ABX for now. No indications of sepsis.    On mechanically assisted ventilation (HCC)- (present on admission)  Assessment & Plan  -Intubated for low GCS, not protecting airway, and hypoxia  -Intubation date 1/12  -Ventilator dependent respiratory failure  -Modify ventilator to optimize oxygenation, acid-base balance and ventilation  -CXR as indicated: monitor lung volumes and tube/line placement  -HOB > 30  -Titrate FiO2 to keep sats greater than 92%  -Chlorhexidine  -goal CO2 35-40  -Daily awakening and SBT trials unless contraindicated  -ABCDEF bundle  -I am actively adjusting ventilator based on clinical indicators  "and ABG's      Acute encephalopathy- (present on admission)  Assessment & Plan  -Metabolic-->Concerning for hypoglycemic event at home vs anoxic brain injury vs seizures  -Keep patient awake during the day and avoid daytime naps. Remove all unnecessary lines (central lines, peripheral IVs, feeding tubes, yadav catheters). Avoid polypharmacy, frequent re-orientation, maximize family time at bedside, use glasses and hearing aids if needed, treat pain, encourage ambulation, minimize benzos/anticholinergic agents.   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 German    S/P CABG x 2- (present on admission)  Assessment & Plan  -History of 2015  -Continue statin and aspirin    Essential hypertension- (present on admission)  Assessment & Plan  -SBP goals < 160  -PRN's available    Multiple vessel coronary artery disease- (present on admission)  Assessment & Plan  -S/p Stent placement followed by CABG 2015    Type 2 diabetes mellitus without complication, without long-term current use of insulin (HCC)- (present on admission)  Assessment & Plan  -Hypoglycemia episode prompting EMS activation and admission to outside facility  -Possible sulfonylurea overdose -octreotide every 6 hours per neurology recommendations x 24 hours with no significant improvement. Stopped 1/16  -Hypoglycemia protocol  -Dietary following for tube feeding recommendations  -try and avoid BG > 180 given risk for glucose reperfusion injury  -Insulin drip 140-180 protocol         VTE:  Lovenox  Ulcer: PPI  Lines: Yadav Catheter  Ongoing indication addressed    I have performed a physical exam and reviewed and updated ROS and Plan today " (1/23/2024). In review of yesterday's note (1/22/2024), there are no changes except as documented above.     Discussed patient condition and risk of morbidity and/or mortality with Family, RN, RT, Pharmacy, Dietary, , Patient, neurology, and my attending Dr. Viera.  The patient remains critically ill.  Critical care time = 55 minutes in directly providing and coordinating critical care and extensive data review.  No time overlap and excludes procedures.    Please note that this dictation was created using voice recognition software. I have made every reasonable attempt to correct obvious errors, but there may be errors of grammar and possibly content that I did not discover before finalizing the note.    YOLANDA Vargas.

## 2024-01-23 NOTE — CARE PLAN
The patient is Watcher - Medium risk of patient condition declining or worsening    Shift Goals  Clinical Goals: Stable/improved neuro exam, neuro assessments per orders and as needed  Patient Goals: ANDREW  Family Goals: Family updates      Problem: Knowledge Deficit - Standard  Goal: Patient and family/care givers will demonstrate understanding of plan of care, disease process/condition, diagnostic tests and medications  Outcome: Progressing     Problem: Skin Integrity  Goal: Skin integrity is maintained or improved  Outcome: Progressing     Problem: Fall Risk  Goal: Patient will remain free from falls  Outcome: Progressing     Problem: Respiratory  Goal: Patient will achieve/maintain optimum respiratory ventilation and gas exchange  Outcome: Progressing

## 2024-01-23 NOTE — CARE PLAN
The patient is Stable - Low risk of patient condition declining or worsening    Shift Goals  Clinical Goals: Stable/improved neuro exam, neuro assessments per orders and as needed  Patient Goals: ANDREW  Family Goals: Family updates    Progress made toward(s) clinical / shift goals:    Problem: Skin Integrity  Goal: Skin integrity is maintained or improved  Outcome: Progressing     Problem: Hemodynamics  Goal: Patient's hemodynamics, fluid balance and neurologic status will be stable or improve  Outcome: Progressing     Problem: Mechanical Ventilation  Goal: Safe management of artificial airway and ventilation  Outcome: Progressing     Problem: Risk for Aspiration  Goal: Patient's risk for aspiration will be absent or decrease  Outcome: Progressing       Patient is not progressing towards the following goals:      Problem: Respiratory  Goal: Patient will achieve/maintain optimum respiratory ventilation and gas exchange  Outcome: Not Progressing     Problem: Mechanical Ventilation  Goal: Successful weaning off mechanical ventilator, spontaneously maintains adequate gas exchange  Outcome: Not Progressing  Goal: Patient will be able to express needs and understand communication  Outcome: Not Progressing

## 2024-01-24 NOTE — CARE PLAN
The patient is Stable - Low risk of patient condition declining or worsening    Shift Goals  Clinical Goals: Stable/improved neuro, Neuro exams per orders and PRN  Patient Goals: ANDREW  Family Goals: Family updates    Progress made toward(s) clinical / shift goals:      Problem: Skin Integrity  Goal: Skin integrity is maintained or improved  Outcome: Progressing     Problem: Hemodynamics  Goal: Patient's hemodynamics, fluid balance and neurologic status will be stable or improve  Outcome: Progressing     Problem: Respiratory  Goal: Patient will achieve/maintain optimum respiratory ventilation and gas exchange  Outcome: Progressing     Problem: Mechanical Ventilation  Goal: Safe management of artificial airway and ventilation  Outcome: Progressing     Problem: Nutrition  Goal: Enteral nutrition will be maintained or improve  Outcome: Progressing     Problem: Urinary Elimination  Goal: Establish and maintain regular urinary output  Outcome: Progressing     Problem: Bowel Elimination  Goal: Establish and maintain regular bowel function  Outcome: Progressing          Patient is not progressing towards the following goals:      Problem: Mechanical Ventilation  Goal: Successful weaning off mechanical ventilator, spontaneously maintains adequate gas exchange  Outcome: Not Progressing

## 2024-01-24 NOTE — PROGRESS NOTES
Referring Physician: Juan Jose Suarez M.D.    S:  Spoke with RN. No neurologic changes. No bedside indicators of neurologic improvement.     O:    Vitals:    01/24/24 0800   BP: 134/75   Pulse: 91   Resp: (!) 28   Temp:    SpO2: 98%     No sedation. Intubated and Ventilated.    Level of consciousness: Withdrawals in the bilateral lower extremities. Briefly and partially opens eyes during noxious stimuli during testing of bilateral plantar responses. Does not follow.     Pupils: Reactive to bright light (OU)  Oculomotor: Conjugate. No gaze deviation. No nystagmus.  Blink to threat: Absent  Corneal reflexes: Intact    Face appears symmetric    Motor: No spontaneous movements. No purposeful movements. No abnormal postures or movements.  Deep tendon reflexes: Not tested.   Plantar responses: Triple flexion    MRI brain: Subtle DWI changes most prominently left posteriorly less conspicuous when compared to prior. Small left occipital subdural hematoma.     cEEG INTERPRETATION:  Abnormal continuous video EEG recording in the awake and drowsy/sleep state(s):  -Moderate background slowing suggestive of diffuse/multifocal cerebral dysfunction and consistent with a non-specific encephalopathy. Clinical correlation recommended.  -No persistent focal or regional slowing and no background asymmetries were seen.   -No definitive epileptiform discharges were seen.  -No definitive seizures.   -Clinical Events: None reported or seen on intermittent video review    No interval neuroimaging or neurodiagnostic studies.     Impression & Recommendations: Comatose after severe, prolonged hypoglycemia.  Overall no significant neurologic improvement.  Withdrawals in the bilateral lower extremities. Opens eyes briefly during testing of the bilateral plantar responses. Does not follow commands. There were no seizure on EEG. The background was diffusely slow. State changes and sleep architecture were noted. Interval MRI brain with small left  occipital sub-dural hematoma. Subtle DWI changes posteriorly as described on my review. No new neurology recommendations. Please reach out with any questions.     I provided a total of 30 minutes of acute neurologic care for this patient, review medical records, diagnostic studies, direct face-to-face time with the patient, documentation and communicating plan of care.      Yogesh Carmen MD  Neurohospitalist, Acute Care Services          Please note that this dictation was created using voice recognition software.  I have made every reasonable attempt to correct obvious errors, but I expect that there are errors of grammar and possibly content that I did not discover before finalizing the note.

## 2024-01-24 NOTE — PROGRESS NOTES
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 Pylori who transferred here with seizures requiring cEEG. Per wife, patient began exhibiting AMS on 1/11. On 1/12 patient was not improving and wife called EMS. Upon arrival of EMS, patients glucose was 22 and he was given 25 gm Dextrose with no improvement in mentation. He was brought to [facility] ED and was emergently intubated for hypoxia and airway protection. 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 Neurology was consulted and recommended 24 hour EEG which is not available at [facility].     Per epileptologist interpreting the EEG 1/16: \"Findings suggest a predisposition for seizures to arise from the left hemisphere, with no seizures observed. There is a resolution of this finding, which in the context of anti-seizure medication treatment, suggests their origin was epileptiform.      In addition, there is focal left hemispheric dysfunction relative to the right, a finding which may be seen in the setting of a structural abnormality, postictally, or in other instances that would confer left hemispheric dysfunction. Clinical correlation recommended. There is also evidence of mild to moderate diffuse cerebral dysfunction of a nonspecific etiology, with superimposed sedative/medication effects\".     1/16 - stopped cEEG. Vent day  #4. No purposeful movements, triple flexion response BLE only. (+) cough/gag/corneals. DNR, I OK.  1/17 -vent day #5.  No significant neurological changes  1/18 - Vent day #6. No significant neurological changes  1/19 - Vent day #7. Palliative Care " consult. Plan for comfort care Monday 1/20 - Vent day #8. Family now wanting to wait 14 days (1/26) from injury to transition to comfort care.   1/21 - Vent day #9. Repeat cEEG negative for seizures.   1/22 - Vent day #10. Repeat brain MRI without significant changes.  1/23 - Vent day #11.     Interval Problem Update  Reviewed last 24 hour events:  No overnight events.  Run of self-limiting VTACH today (20 beats)  Temp max 100.6  ST 70-90's  SBP within goal  Vent day #12: 14/420/8/30  Neuro: Does not follow commands, intermittent spontaneous movements LUE, nonpurposeful.  PERRLA  TF @ goal. Insulin drip @ 6.5. Chemsticks 130-150's  Comfort care tomorrow?  Mobility1 -  not eligible to advance due to mental status    Review of Systems  Review of Systems   Unable to perform ROS: Intubated        Vital Signs for last 24 hours   Pulse:  [] 91  Resp:  [19-33] 28  BP: (122-148)/(63-78) 134/75  SpO2:  [97 %-98 %] 98 %    Hemodynamic parameters for last 24 hours       Respiratory Information for the last 24 hours  Vent Mode: (S)CMV  Rate (breaths/min): 14  Vt Target (mL): 420  PEEP/CPAP: 8  MAP: 11  Control VTE (exp VT): 495    Physical Exam   Physical Exam  Vitals and nursing note reviewed.   Constitutional:       General: He is not in acute distress.     Appearance: Normal appearance. He is ill-appearing.      Interventions: He is intubated.   HENT:      Head: Normocephalic.      Nose: Nose normal.      Mouth/Throat:      Lips: Pink.      Mouth: Mucous membranes are moist.   Eyes:      Pupils: Pupils are equal, round, and reactive to light.   Cardiovascular:      Rate and Rhythm: Normal rate and regular rhythm.      Pulses: Normal pulses.      Heart sounds: Normal heart sounds.   Pulmonary:      Effort: Pulmonary effort is normal. He is intubated.      Breath sounds: No wheezing or rhonchi.   Abdominal:      General: Bowel sounds are normal.      Palpations: Abdomen is soft.   Musculoskeletal:      Right lower leg:  No edema.      Left lower leg: No edema.   Skin:     General: Skin is warm and dry.   Neurological:      GCS: GCS eye subscore is 4. GCS verbal subscore is 1. GCS motor subscore is 1.      Comments: 6T - spontaneous eye opening. Does not track.  Spontaneous, non-purposeful movements LUE.  Triple-flexion RLE  No movements RUE   Psychiatric:      Comments: Intubated         Medications  Current Facility-Administered Medications   Medication Dose Route Frequency Provider Last Rate Last Admin    lansoprazole (Prevacid) solutab 30 mg  30 mg Enteral Tube BID Tammy Morton A.P.R.N.   30 mg at 01/24/24 0550    insulin regular (Humulin R) 100 Units in  mL Infusion  0-29 Units/hr Intravenous Continuous Tammy Morton A.P.R.N. 6.5 mL/hr at 01/24/24 1400 6.5 Units/hr at 01/24/24 1400    dextrose 50% (D50W) injection 12.5-25 g  12.5-25 g Intravenous PRN Tammy Morton A.P.R.N.        levETIRAcetam (Keppra) tablet 1,000 mg  1,000 mg Enteral Tube BID Juan Jose Suarez M.D.   1,000 mg at 01/24/24 0551    scopolamine (Transderm-Scop) patch 1 Patch  1 Patch Transdermal Q72HRS Tammy Morton A.P.R.N.   1 Patch at 01/23/24 1350    labetalol (Normodyne/Trandate) injection 10-20 mg  10-20 mg Intravenous Q4HRS PRN Tammy Morton A.P.R.N.   10 mg at 01/16/24 1612    hydrALAZINE (Apresoline) injection 10-20 mg  10-20 mg Intravenous Q4HRS PRN Tammy Morton A.P.R.N.        Pharmacy Consult: Enteral tube insertion - review meds/change route/product selection  1 Each Other PHARMACY TO DOSE Tammy Morton A.P.R.N.        Respiratory Therapy Consult   Nebulization Continuous RT Victorina Christianson M.D.        [Held by provider] senna-docusate (Pericolace Or Senokot S) 8.6-50 MG per tablet 2 Tablet  2 Tablet Enteral Tube BID Victorina Christianson M.D.   2 Tablet at 01/16/24 1800    And    [Held by provider] polyethylene glycol/lytes (Miralax) Packet 1 Packet  1 Packet Enteral Tube QDAY PRN Victorina Christianson M.D.        And    [Held by  provider] magnesium hydroxide (Milk Of Magnesia) suspension 30 mL  30 mL Enteral Tube QDAY PRN Victorina Christianson M.D.        And    [Held by provider] bisacodyl (Dulcolax) suppository 10 mg  10 mg Rectal QDAY PRN Victorina Christianson M.D.        MD Alert...ICU Electrolyte Replacement per Pharmacy   Other PHARMACY TO DOSE Victorina Christianson M.D.        lidocaine (Xylocaine) 1 % injection 2 mL  2 mL Tracheal Tube Q30 MIN PRN Victorina Christianson M.D.        ipratropium-albuterol (DUONEB) nebulizer solution  3 mL Nebulization Q2HRS PRN (RT) Victorina Christianson M.D.        metoprolol tartrate (Lopressor) tablet 25 mg  25 mg Enteral Tube BID Victorina Christianson M.D.   25 mg at 01/24/24 0550    enoxaparin (Lovenox) inj 40 mg  40 mg Subcutaneous DAILY AT 1800 Victorina Christianson M.D.   40 mg at 01/23/24 1740    ondansetron (Zofran) syringe/vial injection 4 mg  4 mg Intravenous Q4HRS PRN Victorina Christianson M.D.        acetaminophen (Tylenol) tablet 650 mg  650 mg Enteral Tube Q6HRS PRN Victorina Christianson M.D.   650 mg at 01/24/24 0550    ondansetron (Zofran ODT) dispertab 4 mg  4 mg Enteral Tube Q4HRS PRN Victorina Christianson M.D.        atorvastatin (Lipitor) tablet 80 mg  80 mg Enteral Tube Q EVENING Victorina Christianson M.D.   80 mg at 01/23/24 1741    aspirin (Asa) chewable tab 81 mg  81 mg Enteral Tube DAILY Victorina Christianson M.D.   81 mg at 01/24/24 0550       Fluids    Intake/Output Summary (Last 24 hours) at 1/24/2024 1428  Last data filed at 1/24/2024 1200  Gross per 24 hour   Intake 1506.47 ml   Output 2205 ml   Net -698.53 ml       Laboratory          Recent Labs     01/23/24  0802   SODIUM 136   POTASSIUM 4.9   CHLORIDE 100   CO2 29   BUN 30*   CREATININE 0.99   MAGNESIUM 2.3   PHOSPHORUS 4.6*   CALCIUM 8.9     Recent Labs     01/23/24  0802   ALTSGPT 42   ASTSGOT 53*   ALKPHOSPHAT 85   TBILIRUBIN 0.2   GLUCOSE 159*     Recent Labs     01/23/24  0802   WBC 15.3*   NEUTSPOLYS 73.60*   LYMPHOCYTES 9.70*   MONOCYTES 12.00   EOSINOPHILS  1.00   BASOPHILS 0.70   ASTSGOT 53*   ALTSGPT 42   ALKPHOSPHAT 85   TBILIRUBIN 0.2     Recent Labs     01/23/24  0802   RBC 3.33*   HEMOGLOBIN 10.2*   HEMATOCRIT 30.3*   PLATELETCT 319       Imaging  No new imaging today.    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  Suggest hypoglycemia due to possible sulfonylurea overdose.  -Seizure Precautions  -continue Keppra  -repeat cEEG 1/21 negative for seizures    Leukocytosis- (present on admission)  Assessment & Plan  -with fever  -check blood cultures, UA, procalcitonin, BLE US  -holding off on empiric ABX for now. No indications of sepsis.    On mechanically assisted ventilation (HCC)- (present on admission)  Assessment & Plan  -Intubated for low GCS, not protecting airway, and hypoxia  -Intubation date 1/12  -Ventilator dependent respiratory failure  -Modify ventilator to optimize oxygenation, acid-base balance and ventilation  -CXR as indicated: monitor lung volumes and tube/line placement  -HOB > 30  -Titrate FiO2 to keep sats greater than 92%  -Chlorhexidine  -goal CO2 35-40  -Daily awakening and SBT trials unless contraindicated  -ABCDEF bundle  -I am actively adjusting ventilator based on clinical indicators and ABG's      Acute encephalopathy- (present on admission)  Assessment & Plan  -Metabolic-->Concerning for hypoglycemic event at home vs anoxic brain injury vs seizures  -Keep patient awake during the day and avoid daytime naps. Remove all unnecessary lines (central lines, peripheral IVs, feeding tubes, yadav catheters). Avoid polypharmacy, frequent re-orientation, maximize family time at bedside, use glasses and hearing aids if needed, treat pain, encourage ambulation, minimize benzos/anticholinergic agents.   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 German    S/P CABG x 2- (present on admission)  Assessment & Plan  -History of 2015  -Continue statin and aspirin    Essential hypertension- (present on admission)  Assessment & Plan  -SBP goals < 160  -PRN's available    Multiple vessel coronary artery disease- (present on admission)  Assessment & Plan  -S/p Stent placement followed by CABG 2015    Type 2 diabetes mellitus without complication, without long-term current use of insulin (HCC)- (present on admission)  Assessment & Plan  -Hypoglycemia episode prompting EMS activation and admission to outside facility  -Possible sulfonylurea overdose -octreotide every 6 hours per neurology recommendations x 24 hours with no significant improvement. Stopped 1/16  -Hypoglycemia protocol  -Dietary following for tube feeding recommendations  -try and avoid BG > 180 given risk for glucose reperfusion injury  -Insulin drip 140-180 protocol         VTE:  Lovenox  Ulcer: PPI  Lines: Mcbride Catheter  Ongoing indication addressed and PICC.    I have performed a physical exam and reviewed and updated ROS and Plan today (1/24/2024). In review of yesterday's note (1/23/2024), there are no changes except as documented above.     Discussed patient condition and risk of morbidity and/or mortality with RN, RT, Pharmacy, , Patient, neurology, and my attending Dr. Viera.  The patient remains critically ill.  Critical care time = 35 minutes in directly providing and coordinating critical care and extensive data review.  No time overlap and excludes procedures.    Please note that this dictation was created using voice recognition software. I have " made every reasonable attempt to correct obvious errors, but there may be errors of grammar and possibly content that I did not discover before finalizing the note.    YOLANDA Vargas.

## 2024-01-24 NOTE — DISCHARGE PLANNING
Case Management Discharge Planning    Chart reviewed and case discussed in ICU rounds:    Patient with transfer back agreement with [facility] however, wife not agreeable to transfer back.  +Vent (day 12)/insulin drip/rectal tube/NGT/Q4H neurochecks.   Not following commands.   Neurology following.     Plan is for extubation to Comfort Care tomorrow (1/25/2024).

## 2024-01-24 NOTE — CARE PLAN
Problem: Ventilation  Goal: Ability to achieve and maintain unassisted ventilation or tolerate decreased levels of ventilator support  Description: Target End Date:  4 days     Document on Vent flowsheet    1.  Support and monitor invasive and noninvasive mechanical ventilation  2.  Monitor ventilator weaning response  3.  Perform ventilator associated pneumonia prevention interventions  4.  Manage ventilation therapy by monitoring diagnostic test results  Outcome: Not Met                        Ventilator Daily Summary     Vent Day #12     Ventilator settings: 14/420/+8     Weaning trials: N/A     Respiratory Procedures: N/A

## 2024-01-25 PROBLEM — Z51.5 COMFORT MEASURES ONLY STATUS: Status: ACTIVE | Noted: 2024-01-01

## 2024-01-25 NOTE — PROGRESS NOTES
Inpatient Palliative Medicine - Follow Up     Pradip Baez  73 y.o. male  [id number]  Mayur Dorsey M.D.  Location: [facility]  Referral Source:   Juan Jose Suarez M.d.           Reason for palliative medicine consultation and/or visit: goals of care     Assessment and Plan:     GOAL(S) OF CARE = COMFORT, pending compassionate extubation later today when family are ready.     SYMPTOMS ETIOLOGY/CAUSES = comatose state after prolonged hypoglycemia     PROGNOSIS = poor, likely to pass within 48 hours after compassionate extubation     CODE STATUS = COMFORT CARE DNAR DNI     ADVANCE CARE PLANNING =   Medical updates  Family meeting to preview comfort care transition     PALLIATIVE CARE TEAM INTERVENTIONS =    ACP  Family are visiting patient throughout today.  Family have made the difficult decision to compassionately extubate pt later today, when they are ready.  EMR oders and medications streamlined for comfort.  NGT discontinued for comfort.  Will follow closely and assist with symptom  control.           Summary: Pradip Baez is a 73-year-old male with a history of CAD s/p PCI with 2 vessel CABG, T2D (on Lantus 70U QHS, Humalog TID, ozempic and glimeperide), HTN, HLD who was transferred to [facility] on 1/14/24 with minimally responsive state of consciousness with severe hypoglycemia. Palliative care was consulted on 1/18 to discuss goals of care.       1/25/2024  Met with patient's spouse Keely, daughter Bridgett, his 3 sons and neighbors and friends throughout the morning. Family are visiting in anticipation of compassionate life support later today, when they are ready to proceed. This is a difficult decision that they have had to make.    ICU APRN Tammy Frey and I joined family in a meeting to help preview the roads ahead. Family are planning to have their  to visit, then later on, when all are ready to proceed, proceed to compassionate extubation. Family confirmed their goal is consistent with comfort care  philosophy.    Thus patient is transitioned to comfort care, with streamlined medications and orders. NGT discontinued for comfort. Family will inform us later when they are ready to extubate patient.      1/19/2024  Had family meeting today with Pradip's wife, daughter, son and son-in-law. We discussed Pradip's hospital course and current status. We discussed how his current neurologic state was likely due to a prolonged episode of severe hypoglycemia and how there had not been significant improvement in his mentation thus far in his hospital stay. Family related that Pradip would not find his current functional state as an acceptable quality of life. They were hopeful for a miracle, but understood that return of neurologic function was unlikely. They wanted to honor what Pradip would have wanted and had therefore decided that they would proceed with comfort care on Monday, 1/22/24. They wanted to have their  come and pray with them. They were agreeable to our  visiting as well.      Discussed what comfort care would look like. Family is all in agreement to proceed with comfort care on 1/22. Family knows that critical care team can manage comfort care. Palliative care will plan to check on family on Monday as well.      Advance care planning:  The patient and/or legal decision maker has provided voluntary consent to discuss advance care planning. We discussed code status.      Total time spent in ACP discussion 30 minutes, which is separate from the time spent completing the evaluation and management visit.      I spent a total of 35minutes reviewing medical records, direct face-to-face time with the patient and/or family, documentation and coordination of care. This is separate from the time spent on advance care planning, which is documented above         DO Dave BOWERS ([facility]) [facility]   [id number] [facility] [place]  [id number]  P: [phone]  F: [phone]  C:  [phone]

## 2024-01-25 NOTE — PROGRESS NOTES
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 Pylori who transferred here with seizures requiring cEEG. Per wife, patient began exhibiting AMS on 1/11. On 1/12 patient was not improving and wife called EMS. Upon arrival of EMS, patients glucose was 22 and he was given 25 gm Dextrose with no improvement in mentation. He was brought to [facility] ED and was emergently intubated for hypoxia and airway protection. 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 Neurology was consulted and recommended 24 hour EEG which is not available at [facility].     Per epileptologist interpreting the EEG 1/16: \"Findings suggest a predisposition for seizures to arise from the left hemisphere, with no seizures observed. There is a resolution of this finding, which in the context of anti-seizure medication treatment, suggests their origin was epileptiform.      In addition, there is focal left hemispheric dysfunction relative to the right, a finding which may be seen in the setting of a structural abnormality, postictally, or in other instances that would confer left hemispheric dysfunction. Clinical correlation recommended. There is also evidence of mild to moderate diffuse cerebral dysfunction of a nonspecific etiology, with superimposed sedative/medication effects\".     1/16 - stopped cEEG. Vent day  #4. No purposeful movements, triple flexion response BLE only. (+) cough/gag/corneals. DNR, I OK.  1/17 -vent day #5.  No significant neurological changes  1/18 - Vent day #6. No significant neurological changes  1/19 - Vent day #7. Palliative Care consult. Plan for " comfort care Monday 1/20 - Vent day #8. Family now wanting to wait 14 days (1/26) from injury to transition to comfort care.   1/21 - Vent day #9. Repeat cEEG negative for seizures.   1/22 - Vent day #10. Repeat brain MRI without significant changes.  1/23 - Vent day #11.   1/24 - Vent day #12    Interval Problem Update  Reviewed last 24 hour events:  No overnight events.  SR 70-80's  SBP's 120s to 140s, no drips  Neuro: not following. Eyes open spontaneously but does not track. Nonpurposeful movements BLE. HENRY flaccid  Vent day #13: APVC 14/420/8/30%  RASS -4, no sedation.  No pain meds  TF at goal. BMS  Insulin @ 8.5. Chemsticks 130s to 180s  I/oh: 1900/1500  Mcbride, PICC  Lovenox, PPI  Mobility 1    Awaiting family arrival to discuss decision to transition to comfort care.     Review of Systems  Review of Systems   Unable to perform ROS: Intubated        Vital Signs for last 24 hours   Pulse:  [] 101  Resp:  [14-36] 24  BP: (105-152)/(63-80) 134/80  SpO2:  [96 %-100 %] 99 %    Hemodynamic parameters for last 24 hours       Respiratory Information for the last 24 hours  Vent Mode: APVCMV  Rate (breaths/min): 14  Vt Target (mL): 420  PEEP/CPAP: 8  MAP: 14  Control VTE (exp VT): 302    Physical Exam   Physical Exam  Vitals and nursing note reviewed.   Constitutional:       General: He is not in acute distress.     Appearance: Normal appearance. He is ill-appearing.      Interventions: He is intubated.   HENT:      Head: Normocephalic.      Nose: Nose normal.      Mouth/Throat:      Lips: Pink.      Mouth: Mucous membranes are moist.   Eyes:      Pupils: Pupils are equal, round, and reactive to light.   Cardiovascular:      Rate and Rhythm: Normal rate and regular rhythm.      Pulses: Normal pulses.      Heart sounds: Normal heart sounds.   Pulmonary:      Effort: Pulmonary effort is normal. He is intubated.      Breath sounds: No wheezing or rhonchi.   Abdominal:      General: Bowel sounds are normal.       Palpations: Abdomen is soft.   Musculoskeletal:      Right lower leg: No edema.      Left lower leg: No edema.   Skin:     General: Skin is warm and dry.   Neurological:      GCS: GCS eye subscore is 4. GCS verbal subscore is 1. GCS motor subscore is 1.      Comments: 6T - spontaneous eye opening. Does not track.  Spontaneous, non-purposeful movements LUE.  Triple-flexion RLE  No movements RUE   Psychiatric:      Comments: Intubated         Medications  Current Facility-Administered Medications   Medication Dose Route Frequency Provider Last Rate Last Admin    lansoprazole (Prevacid) solutab 30 mg  30 mg Enteral Tube BID Tammy Morton A.P.R.N.   30 mg at 01/25/24 0508    insulin regular (Humulin R) 100 Units in  mL Infusion  0-29 Units/hr Intravenous Continuous Tammy Morton A.P.R.N. 8.5 mL/hr at 01/25/24 1016 8.5 Units/hr at 01/25/24 1016    dextrose 50% (D50W) injection 12.5-25 g  12.5-25 g Intravenous PRN Tammy Morton A.P.R.N.        levETIRAcetam (Keppra) tablet 1,000 mg  1,000 mg Enteral Tube BID Juan Jose Suarez M.D.   1,000 mg at 01/25/24 0508    scopolamine (Transderm-Scop) patch 1 Patch  1 Patch Transdermal Q72HRS Tammy Morton, A.P.R.N.   1 Patch at 01/23/24 1350    labetalol (Normodyne/Trandate) injection 10-20 mg  10-20 mg Intravenous Q4HRS PRN Tammy Morton, A.P.R.N.   10 mg at 01/16/24 1612    hydrALAZINE (Apresoline) injection 10-20 mg  10-20 mg Intravenous Q4HRS PRN Tammy Morton A.P.R.N.        Pharmacy Consult: Enteral tube insertion - review meds/change route/product selection  1 Each Other PHARMACY TO DOSE Tammy Morton A.P.R.N.        Respiratory Therapy Consult   Nebulization Continuous RT Victorina Christianson M.D.        [Held by provider] senna-docusate (Pericolace Or Senokot S) 8.6-50 MG per tablet 2 Tablet  2 Tablet Enteral Tube BID Victorina Christianson M.D.   2 Tablet at 01/16/24 1800    And    [Held by provider] polyethylene glycol/lytes (Miralax) Packet 1 Packet  1 Packet  Enteral Tube QDAY PRN Victorina Christianson M.D.        And    [Held by provider] magnesium hydroxide (Milk Of Magnesia) suspension 30 mL  30 mL Enteral Tube QDAY PRN Victorina Christianson M.D.        And    [Held by provider] bisacodyl (Dulcolax) suppository 10 mg  10 mg Rectal QDAY PRN Victorina Christianson M.D. MD Alert...ICU Electrolyte Replacement per Pharmacy   Other PHARMACY TO DOSE Victorina Christianson M.D.        lidocaine (Xylocaine) 1 % injection 2 mL  2 mL Tracheal Tube Q30 MIN PRN Victorina Christianson M.D.        ipratropium-albuterol (DUONEB) nebulizer solution  3 mL Nebulization Q2HRS PRN (RT) Victorina Christianson M.D.        metoprolol tartrate (Lopressor) tablet 25 mg  25 mg Enteral Tube BID Victorina Christianson M.D.   25 mg at 01/25/24 0508    enoxaparin (Lovenox) inj 40 mg  40 mg Subcutaneous DAILY AT 1800 Victorina Christianson M.D.   40 mg at 01/24/24 1802    ondansetron (Zofran) syringe/vial injection 4 mg  4 mg Intravenous Q4HRS PRN Victorina Christianson M.D.        acetaminophen (Tylenol) tablet 650 mg  650 mg Enteral Tube Q6HRS PRN Victorina Christianson M.D.   650 mg at 01/24/24 1802    ondansetron (Zofran ODT) dispertab 4 mg  4 mg Enteral Tube Q4HRS PRN Victorina Christianson M.D.        atorvastatin (Lipitor) tablet 80 mg  80 mg Enteral Tube Q EVENING Victorina Christianson M.D.   80 mg at 01/24/24 1802    aspirin (Asa) chewable tab 81 mg  81 mg Enteral Tube DAILY Victorina Christianson M.D.   81 mg at 01/25/24 0508       Fluids    Intake/Output Summary (Last 24 hours) at 1/25/2024 1037  Last data filed at 1/25/2024 0800  Gross per 24 hour   Intake 1711.26 ml   Output 1525 ml   Net 186.26 ml       Laboratory          Recent Labs     01/23/24  0802   SODIUM 136   POTASSIUM 4.9   CHLORIDE 100   CO2 29   BUN 30*   CREATININE 0.99   MAGNESIUM 2.3   PHOSPHORUS 4.6*   CALCIUM 8.9     Recent Labs     01/23/24  0802   ALTSGPT 42   ASTSGOT 53*   ALKPHOSPHAT 85   TBILIRUBIN 0.2   GLUCOSE 159*     Recent Labs     01/23/24  0802   WBC 15.3*    NEUTSPOLYS 73.60*   LYMPHOCYTES 9.70*   MONOCYTES 12.00   EOSINOPHILS 1.00   BASOPHILS 0.70   ASTSGOT 53*   ALTSGPT 42   ALKPHOSPHAT 85   TBILIRUBIN 0.2     Recent Labs     01/23/24  0802   RBC 3.33*   HEMOGLOBIN 10.2*   HEMATOCRIT 30.3*   PLATELETCT 319       Imaging  No new imaging today.    Assessment/Plan  * Seizure-like activity (HCC)- (present on admission)  Assessment & Plan  -Patient transferred here for cEEG services not available at outside facility  -Brain MRI at outside facility showing likely neuroglycopenia injury. Repeat MRI brain done here unchanged.  -Neurology following.   -Seizure Precautions  -continue Keppra  -repeat cEEG 1/21 negative for seizures    Leukocytosis- (present on admission)  Assessment & Plan  -with fever  -workup negative for sepsis    On mechanically assisted ventilation (HCC)- (present on admission)  Assessment & Plan  -Intubated for low GCS, not protecting airway, and hypoxia  -Intubation date 1/12  -Ventilator dependent respiratory failure  -Modify ventilator to optimize oxygenation, acid-base balance and ventilation  -CXR as indicated: monitor lung volumes and tube/line placement  -HOB > 30  -Titrate FiO2 to keep sats greater than 92%  -Chlorhexidine  -goal CO2 35-40  -Daily awakening and SBT trials unless contraindicated  -ABCDEF bundle  -I am actively adjusting ventilator based on clinical indicators and ABG's      Acute encephalopathy- (present on admission)  Assessment & Plan  -Metabolic-->Concerning for hypoglycemic event at home vs anoxic brain injury vs seizures  -Keep patient awake during the day and avoid daytime naps. Remove all unnecessary lines (central lines, peripheral IVs, feeding tubes, yadav catheters). Avoid polypharmacy, frequent re-orientation, maximize family time at bedside, use glasses and hearing aids if needed, treat pain, encourage ambulation, minimize benzos/anticholinergic agents.   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 German    S/P CABG x 2- (present on admission)  Assessment & Plan  -History of 2015  -Continue statin and aspirin    Essential hypertension- (present on admission)  Assessment & Plan  -SBP goals < 160  -PRN's available    Multiple vessel coronary artery disease- (present on admission)  Assessment & Plan  -S/p Stent placement followed by CABG 2015    Type 2 diabetes mellitus without complication, without long-term current use of insulin (HCC)- (present on admission)  Assessment & Plan  -Hypoglycemia episode prompting EMS activation and admission to outside facility  -Possible sulfonylurea overdose -octreotide every 6 hours per neurology recommendations x 24 hours with no significant improvement. Stopped 1/16  -Hypoglycemia protocol  -Dietary following for tube feeding recommendations  -try and avoid BG > 180 given risk for glucose reperfusion injury  -Insulin drip 140-180 protocol         VTE:  Lovenox  Ulcer: PPI  Lines: Mcbride Catheter  Ongoing indication addressed and PICC    I have performed a physical exam and reviewed and updated ROS and Plan today (1/25/2024). In review of yesterday's note (1/24/2024), there are no changes except as documented above.     Discussed patient condition and risk of morbidity and/or mortality with RN, RT, Pharmacy, Dietary, , neurology, and my attending Dr. Viera.  The patient remains critically ill.  Critical care time = 39 minutes in directly providing and coordinating critical care and extensive data review.  No time overlap and excludes procedures.    Please note that this dictation was created using voice recognition software. I have made every reasonable attempt to " correct obvious errors, but there may be errors of grammar and possibly content that I did not discover before finalizing the note.    YOLANDA Vargas.

## 2024-01-25 NOTE — CARE PLAN
Problem: Ventilation  Goal: Ability to achieve and maintain unassisted ventilation or tolerate decreased levels of ventilator support  Description: Target End Date:  4 days     Document on Vent flowsheet    1.  Support and monitor invasive and noninvasive mechanical ventilation  2.  Monitor ventilator weaning response  3.  Perform ventilator associated pneumonia prevention interventions  4.  Manage ventilation therapy by monitoring diagnostic test results  Outcome: Progressing     Ventilator Daily Summary    Vent Day # 13  Airway: ETT 7.5 @ 24    Ventilator settings: 14/420/8/30   Weaning trials: none  Respiratory Procedures: none      Plan: Continue current ventilator settings and wean mechanical ventilation as tolerated per physician orders.

## 2024-01-25 NOTE — CARE PLAN
The patient is Watcher - Medium risk of patient condition declining or worsening    Shift Goals  Clinical Goals: Stable/improved neuro, Neuro exams per orders and PRN  Patient Goals: ANDREW  Family Goals: Family updates      Problem: Knowledge Deficit - Standard  Goal: Patient and family/care givers will demonstrate understanding of plan of care, disease process/condition, diagnostic tests and medications  Outcome: Progressing     Problem: Skin Integrity  Goal: Skin integrity is maintained or improved  Outcome: Progressing     Problem: Hemodynamics  Goal: Patient's hemodynamics, fluid balance and neurologic status will be stable or improve  Outcome: Progressing     Problem: Venous Thromboembolism (VTE) Prevention  Goal: The patient will remain free from venous thromboembolism (VTE)  Outcome: Progressing

## 2024-01-25 NOTE — DIETARY
Nutrition Services:  RD previously following patient. Patient now transitioned to comfort care and TF orders have been discontinued. Re-consult RD as needed.     RD available prn.

## 2024-01-25 NOTE — CARE PLAN
The patient is Stable - Low risk of patient condition declining or worsening    Shift Goals  Clinical Goals: Q4h neuro checks, SBP <160, management of insulin drip  Patient Goals: ANDREW  Family Goals: updates    Progress made toward(s) clinical / shift goals:    Pain is being assessed per policy. Q2h turns in place. Elbow/heel mepilex on. Barrier paste applied around pt's BMS. Aspiration precautions in place and oral care is being done Q2h.     Problem: Skin Integrity  Goal: Skin integrity is maintained or improved  Outcome: Progressing     Problem: Fall Risk  Goal: Patient will remain free from falls  Outcome: Progressing     Problem: Risk for Aspiration  Goal: Patient's risk for aspiration will be absent or decrease  Outcome: Progressing     Problem: Pain - Standard  Goal: Alleviation of pain or a reduction in pain to the patient’s comfort goal  Outcome: Progressing     Problem: Venous Thromboembolism (VTE) Prevention  Goal: The patient will remain free from venous thromboembolism (VTE)  Outcome: Progressing    Patient is not progressing towards the following goals:

## 2024-01-25 NOTE — CONSULTS
Hospital Medicine Consultation    Date of Service  1/25/2024    Referring Physician  Franki Viera D.O.    Consulting Physician  Luis Alfredo Hansen M.D.    Reason for Consultation  encephalopathy    History of Presenting Illness  73 y.o. male who presented 1/14/2024 with decreased level of consciousness.  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  He was found to have a blood glucose of 22.  He was brought to [facility] where he required intubation.  He remained encephalopathic despite stopping sedation there.  MRI of the brain was quite significant for hypoglycemic encephalopathy with loss of gray-white matter differentiation in the left frontal, parietal and occipital lobes.  He was transferred here for 24-hour EEG monitoring.  Neurology was consulted.  EEG was read as diffuse multifocal cerebral dysfunction consistent with nonspecific encephalopathy but no seizure activity.  An MRI here was read as small left occipital subdural hematoma.  He had no improvement in his mentation therefore decision was made for comfort care.  He was extubated on 1/25/ 2024 to room air.     Review of Systems  Review of Systems   Unable to perform ROS: Mental acuity       Past Medical History   has a past medical history of CAD (coronary artery disease), Diabetes, Diabetes ([facility]), F/u of acute inferolateral myocardial infarction ([facility]) (2/7/2012), Hyperlipidemia, Hypertension, and Myocardial infarct ([facility]) (1/19/2012).    Surgical History   has a past surgical history that includes stent placement; other cardiac surgery (1/19/2012); recovery (10/2/2015); and multiple coronary artery bypass endo vein harvest (10/6/2015).    Family History  family history includes Cancer in his mother; Diabetes in his  father and mother; Heart Disease in his father and sister.    Social History   reports that he has never smoked. He has never used smokeless tobacco. He reports current alcohol use. He reports that he does not use drugs.    Medications  Prior to Admission Medications   Prescriptions Last Dose Informant Patient Reported? Taking?   OZEMPIC, 0.25 OR 0.5 MG/DOSE, 2 MG/1.5ML Solution Pen-injector UNK at [facility] Patient's Home Pharmacy Yes No   Sig: Inject 0.5 mg under the skin every 7 days.   TOUJEO SOLOSTAR 300 UNIT/ML Solution Pen-injector UNK at [facility] Patient's Home Pharmacy Yes No   Sig: Inject 70 Units under the skin every day.   atorvastatin (LIPITOR) 80 MG tablet UNK at [facility] Patient's Home Pharmacy Yes No   Sig: Take 80 mg by mouth every evening.   bismuth subsalicylate (PEPTO-BISMOL) 262 MG/15ML Suspension UNK at [facility] Patient's Home Pharmacy No No   Sig: Take 30 mL by mouth 4 times a day for 14 days.   glimepiride (AMARYL) 4 MG Tab UNK at [facility] Patient's Home Pharmacy Yes No   Sig: Take 4 mg by mouth 2 times a day.   insulin lispro 100 UNIT/ML SC SOPN injection PEN UNK at [facility] Patient's Home Pharmacy Yes Yes   Sig: Inject 5 Units under the skin 3 times a day before meals. 180-200=2 units  201-230=3 units  231-260=4 units  261-290=5 units  291-320=6 units  321-350=7 units  Greater than 351-8 units   metoprolol tartrate (LOPRESSOR) 25 MG Tab UNK at [facility] Patient's Home Pharmacy No No   Sig: Take 1 Tablet by mouth 2 times a day.   metroNIDAZOLE (FLAGYL) 500 MG Tab UNK at [facility] Patient's Home Pharmacy No No   Sig: Take 1 Tablet by mouth 3 times a day for 14 days.   omeprazole (PRILOSEC) 40 MG delayed-release capsule UNK at [facility] Patient's Home Pharmacy No No   Sig: Take 1 Capsule by mouth 2 times a day for 14 days.   ondansetron (ZOFRAN) 8 MG Tab UNK at [facility] Patient's Home Pharmacy Yes No   Sig: Take 8 mg by mouth every 8 hours as needed for Nausea/Vomiting.   promethazine (PHENERGAN) 25 MG Suppos UNK at [facility] Patient's Home Pharmacy  Yes Yes   Sig: Insert 25 mg into the rectum every four hours as needed for Nausea/Vomiting.   senna-docusate (PERICOLACE OR SENOKOT S) 8.6-50 MG Tab UNK at [facility] Patient's Home Pharmacy No No   Sig: Take 1 Tablet by mouth 1 time a day as needed for Constipation.   tamsulosin (FLOMAX) 0.4 MG capsule UNK at [facility] Patient's Home Pharmacy Yes No   Sig: Take 0.4 mg by mouth every day.   tetracycline (SUMYCIN) 500 MG Cap UNK at [facility] Patient's Home Pharmacy No No   Sig: Take 1 Capsule by mouth 4 times a day for 14 days.      Facility-Administered Medications: None       Allergies  No Known Allergies    Physical Exam  Pulse:  [] 103  Resp:  [14-32] 32  BP: (105-152)/(63-80) 139/74  SpO2:  [96 %-100 %] 97 %    Physical Exam  Vitals and nursing note reviewed.   Cardiovascular:      Rate and Rhythm: Tachycardia present.   Pulmonary:      Comments: On room air, snoring  Genitourinary:     Comments: Mcbride and rectal tube  Neurological:      Comments: Nonresponsive         Fluids  Date 01/25/24 0700 - 01/26/24 0659   Shift 8456-0128 5888-6351 2237-4819 24 Hour Total   INTAKE   I.V. 44.1   44.1   NG/   360   Enteral 1135   1135   Shift Total 1539.1   1539.1   OUTPUT   Shift Total       Weight (kg) 75.9 75.9 75.9 75.9       Laboratory  Recent Labs     01/23/24  0802   WBC 15.3*   RBC 3.33*   HEMOGLOBIN 10.2*   HEMATOCRIT 30.3*   MCV 91.0   MCH 30.6   MCHC 33.7   RDW 43.3   PLATELETCT 319   MPV 8.4*     Recent Labs     01/23/24  0802   SODIUM 136   POTASSIUM 4.9   CHLORIDE 100   CO2 29   GLUCOSE 159*   BUN 30*   CREATININE 0.99   CALCIUM 8.9                     Imaging  DX-CHEST-PORTABLE (1 VIEW)   Final Result      1. No acute findings.   2. Other stable chronic postsurgical changes.   3. Appropriate positions of the life-support tubes.      MR-BRAIN-WITH & W/O   Final Result         Small left occipital subacute subdural hematoma measuring 1 to 1.5 mm thickness without mass effect.      No evidence of acute infarction.       Age-related volume loss and chronic microvascular ischemic changes.         DX-CHEST-PORTABLE (1 VIEW)   Final Result         1.  No acute cardiopulmonary disease.      DX-CHEST-PORTABLE (1 VIEW)   Final Result         1.  No acute cardiopulmonary disease.      DX-CHEST-PORTABLE (1 VIEW)   Final Result         1.  No acute cardiopulmonary disease.      DX-CHEST-PORTABLE (1 VIEW)   Final Result         1.  No acute cardiopulmonary disease.      DX-CHEST-PORTABLE (1 VIEW)   Final Result      OUTSIDE IMAGES-CT HEAD   Final Result      OUTSIDE IMAGES-DX CHEST   Final Result      OUTSIDE IMAGES-DX CHEST   Final Result      MR-FOREIGN FILM MRI   Final Result          Assessment/Plan  * Acute encephalopathy- (present on admission)  Assessment & Plan  Acute intractable and irreversible brain injury secondary to prolonged hypoglycemia.  He has been evaluated by neurology with continuous EEG monitoring and protracted ICU stay on mechanical ventilation with no improvement.  He has been extubated 1/25/2024 to comfort care.  IV Dilaudid for pain or air hunger and IV Ativan for agitation  Comfort care  End-of-life  Family is at bedside    Hypoglycemia- (present on admission)  Assessment & Plan  Severe hypoglycemia with a presenting glucose of 22 and resultant brain injury    On mechanically assisted ventilation (HCC)- (present on admission)  Assessment & Plan  Intubated for airway protection and extubated 1/25/2024 to comfort care    Head and neck cancer (HCC)- (present on admission)  Assessment & Plan  Recently diagnosed November 2023    Essential hypertension- (present on admission)  Assessment & Plan  History of    Multiple vessel coronary artery disease- (present on admission)  Assessment & Plan  History of bypass as well as stenting

## 2024-01-26 LAB
CHLORPROPAMIDE SERPL-MCNC: NORMAL MCG/ML
GLIMEPIRIDE SERPL-MCNC: 61 NG/ML
GLIPIZIDE SERPL-MCNC: NORMAL NG/ML
GLYBURIDE SERPL-MCNC: NORMAL NG/ML
NATEGLINIDE SERPL-MCNC: NORMAL MCG/ML
PIOGLATAZONE NL11650: NORMAL NG/ML
REPAGLINIDE SERPL-MCNC: NORMAL NG/ML
ROSIGLITAZONE NL11649: NORMAL NG/ML
TOLAZAMIDE SERPL-MCNC: NORMAL MCG/ML
TOLBUTAMIDE SERPL-MCNC: NORMAL MCG/ML

## 2024-01-26 NOTE — ASSESSMENT & PLAN NOTE
History of bypass as well as stenting   Medication Therapy Management (MTM) Encounter    ASSESSMENT:                            Medication Adherence/Access: No issues identified    Type 2 Diabetes:   Patient is meeting A1c goal of < 7%. Self monitoring of blood glucose is at goal of fasting  mg/dL.  Patient is tolerating Mounjaro 10 mg weekly appropriately, so recommend increasing dose to help continue to control blood sugars and help with weight loss.  Patient will reach out if he starts to have lows. Did discuss lows are rare on current regimen, but could reduce metformin dose if needed.     Hypertension:   Stable. Patient is meeting blood pressure goal of < 140/90mmHg.     Hyperlipidemia:   Stable.  Patient is on high intensity statin which is indicated based on 2019 ACC/AHA guidelines for lipid management.      PLAN:                            Increase Mounjaro to 12.5 mg a week      Follow-up: Return in about 4 weeks (around 12/19/2023) for Medication Therapy Management, Phone Visit.    SUBJECTIVE/OBJECTIVE:                          Billy Chavez is a 51 year old male called for a follow-up visit from 10/24/23.       Reason for visit: Mounjaro follow up.    Allergies/ADRs: Reviewed in chart  Past Medical History: Reviewed in chart  Tobacco: He reports that he has quit smoking. His smoking use included cigarettes. He has never used smokeless tobacco.  Alcohol: not currently using    Medication Adherence/Access: no issues reported    Diabetes Type 2 Diabetes:    Mounjaro 10 mg weekly  Metformin XR 1500 mg daily     Reports that he has felt less hungry, so he does feel like the higher dose has been having an effect. Is interested in increasing the dose further.  Hasn't bought a scale yet - bought one but it didn't connect to his phone, working to find one that does.  Has also gotten a gym membership, but hasn't started going there regularly.  Injection site reaction that he was experiencing has mostly resolved, still some mild redness with the  injection.    Blood sugar monitoring: fasting time(s) daily; Ranges: (patient reported) Reports only one reading under 80 in the past month. Doesn't seem to happen too frequently, but maybe if he doesn't eat anything all day.  Current diabetes symptoms: none  Diet/Exercise: Patient reports that he has been exercising more lately.            Wt Readings from Last 5 Encounters:   10/24/23 256 lb 9 oz (116.4 kg)   06/21/23 265 lb (120.2 kg)            Hypertension   Amlodipine 5 mg daily  Lisinopril-hydrochlorothiazide 20-12.5 mg daily  Patient reports no current medication side effects. Occasional lightheadedness when moving from laying on the couch to standing. No concerns with falling and doesn't happen very often.  Patient does not self-monitor blood pressure.        BP Readings from Last 3 Encounters:   10/24/23 128/80   06/21/23 126/84     Pulse Readings from Last 3 Encounters:   10/24/23 79     Hyperlipidemia   rosuvastatin 20 mg daily  Patient reports no significant myalgias or other side effects.                 Today's Vitals: There were no vitals taken for this visit.  ----------------      I spent 22 minutes with this patient today. All changes were made via collaborative practice agreement with Min Tong MD. A copy of the visit note was provided to the patient's provider(s).    A summary of these recommendations was declined by the patient.    Zoila Soares, PharmD  Medication Therapy Management Pharmacist  Voicemail: [phone]      Telemedicine Visit Details  Type of service:  Telephone visit  Start Time:  9:30 AM  End Time: 9:52 AM     Medication Therapy Recommendations  Type 2 diabetes mellitus without complication, without long-term current use of insulin (H)    Current Medication: tirzepatide (MOUNJARO) 10 MG/0.5ML pen (Discontinued)   Rationale: Dose too low - Dosage too low - Effectiveness   Recommendation: Increase Dose   Status: Accepted per CPA

## 2024-01-26 NOTE — DISCHARGE SUMMARY
Death Summary    Cause of Death  encephalopathy due to hypoglycemia .    Comorbid Conditions at the Time of Death  Principal Problem:    Acute encephalopathy (POA: Yes)  Active Problems:    Hypoglycemia (POA: Yes)    Type 2 diabetes mellitus without complication, without long-term current use of insulin (HCC) (POA: Yes)    Multiple vessel coronary artery disease (Chronic) (POA: Yes)      Overview: Oct 2015 Cor angio:      1. High-grade distal rPLB and PDA disease      2. High-grade 80% proxinal LAD/D1 bifurcation disease (Lopez 1,1,0)      3. Mild ISR LCx BMS, FFR 0.95ending branches of right coronary.      January 2012: High-grade proximal circumflex stenosis, drug-eluting stent;       50-75% mid LAD stenosis, 50% first diagonal stenosis; 75% posterolateral,       and 75% posterior desc    Essential hypertension (POA: Yes)    S/P CABG x 2 (Chronic) (POA: Yes)      Overview: Oct 2015 Coronary bypass grafting x2, left JAMES to the distal diagonal,       reverse saphenous vein graft to the distal intramyocardial LAD, endoscopic       vein harvest.    Head and neck cancer (HCC) (POA: Yes)    Leukocytosis (POA: Yes)    On mechanically assisted ventilation (HCC) (POA: Yes)    Seizure-like activity (HCC) (POA: Yes)    Comfort measures only status (POA: Yes)  Resolved Problems:    GIB (gastrointestinal bleeding) (POA: Yes)      History of Presenting Illness and Hospital Course  This is a 73 y.o. male admitted 1/14/2024 with decreased level of consciousness.    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  He was found to have a blood glucose of 22.  He was brought to [facility] where he required intubation.  He remained encephalopathic despite stopping sedation there.  MRI of the  brain was quite significant for hypoglycemic encephalopathy with loss of gray-white matter differentiation in the left frontal, parietal and occipital lobes.  He was transferred here for 24-hour EEG monitoring.  Neurology was consulted.  EEG was read as diffuse multifocal cerebral dysfunction consistent with nonspecific encephalopathy but no seizure activity.  An MRI here was read as small left occipital subdural hematoma.  He had no improvement in his mentation therefore decision was made for comfort care.  He was extubated on 2024 to room air.     He  in comfort and with dignity with family and friends at bedside.

## 2024-01-26 NOTE — PROGRESS NOTES
TOD 1717. Patient passed comfortably with multiple [facility] ICU staff at bedside. Family notified by phone.

## 2024-01-26 NOTE — ASSESSMENT & PLAN NOTE
Acute intractable and irreversible brain injury secondary to prolonged hypoglycemia.  He has been evaluated by neurology with continuous EEG monitoring and protracted ICU stay on mechanical ventilation with no improvement.  He has been extubated 1/25/2024 to comfort care.  IV Dilaudid for pain or air hunger and IV Ativan for agitation  Comfort care  End-of-life  Family is at bedside

## 2024-01-28 LAB
BACTERIA BLD CULT: NORMAL
BACTERIA BLD CULT: NORMAL
SIGNIFICANT IND 70042: NORMAL
SIGNIFICANT IND 70042: NORMAL
SITE SITE: NORMAL
SITE SITE: NORMAL
SOURCE SOURCE: NORMAL
SOURCE SOURCE: NORMAL